# Patient Record
Sex: FEMALE | Race: WHITE
[De-identification: names, ages, dates, MRNs, and addresses within clinical notes are randomized per-mention and may not be internally consistent; named-entity substitution may affect disease eponyms.]

---

## 2018-10-22 ENCOUNTER — HOSPITAL ENCOUNTER (OUTPATIENT)
Dept: HOSPITAL 62 - RT | Age: 78
End: 2018-10-22
Attending: INTERNAL MEDICINE
Payer: MEDICARE

## 2018-10-22 DIAGNOSIS — J70.1: Primary | ICD-10-CM

## 2018-10-22 PROCEDURE — 94729 DIFFUSING CAPACITY: CPT

## 2018-10-22 PROCEDURE — 94010 BREATHING CAPACITY TEST: CPT

## 2018-10-22 PROCEDURE — 94727 GAS DIL/WSHOT DETER LNG VOL: CPT

## 2018-10-24 NOTE — PULMONARY FUNCTION TEST
Pulmonary Function Test


Date of Procedure:: 10/24/18


INDICATION:: Dyspnea on exertion


Referring Provider: Dr. Wright


Technician: Edie Mosley CRT





- Report


Spirometry: 





FVC 1.23 L       53%





FEV1 0.98 L       54%





FEV1/FVC %       80    predicted 82





FEF 25-75% 1.04 L    61%





Lung Volume: 





Total lung capacity 2.65 L    67%





Vital capacity 1.23 L    53%





Inspiratory capacity 1.05 L





FRC in 2 1.60 L    98%





ERV 0.13 L





RV 1.42 L       85%





RV/TLC %       53    predicted 42





Diffusion Capactity: 





Diffusion capacity 7.6   35%





DLCO/VA 4.10    116%


Impression: 





Mild restrictive ventilatory defect.  (Restrictive defect may mask the degree 

of obstruction.)  Mild obstructive ventilatory defect.  No hyperinflation or 

air trapping.  Severe decrease in diffusion capacity.

## 2019-02-06 ENCOUNTER — HOSPITAL ENCOUNTER (OUTPATIENT)
Dept: HOSPITAL 62 - RAD | Age: 79
End: 2019-02-06
Attending: INTERNAL MEDICINE
Payer: MEDICARE

## 2019-02-06 DIAGNOSIS — J98.4: Primary | ICD-10-CM

## 2019-02-06 PROCEDURE — 71250 CT THORAX DX C-: CPT

## 2019-02-06 NOTE — RADIOLOGY REPORT (SQ)
EXAM DESCRIPTION:  CT CHEST WITHOUT



COMPLETED DATE/TIME:  2/6/2019 1:17 pm



REASON FOR STUDY:  RESTRICTIVE LUNG DISEASE (J98.4) J98.4  OTHER DISORDERS OF LUNG



COMPARISON:  None.



TECHNIQUE:  CT scan performed of the chest without intravenous contrast.  Images reviewed with lung, 
soft tissue and bone windows.  Reconstructed coronal and sagittal MPR images reviewed.  All images st
ored on PACS.

All CT scanners at this facility use dose modulation, iterative reconstruction, and/or weight based d
osing when appropriate to reduce radiation dose to as low as reasonably achievable (ALARA).

[facility]: Dose Right  [facility]: CareDose    [facility]: Dose Right    [facility]: Teradose 4D    [facility]: Smart Modern Feed



RADIATION DOSE:  CT Rad equipment meets quality standard of care and radiation dose reduction techniq
ues were employed. CTDIvol: 16.7 mGy. DLP: 592 mGy-cm. mGy.



LIMITATIONS:  No technical limitations.



FINDINGS:  LUNGS AND PLEURA: Volume loss in the right lung with areas of bronchiectasis and periphera
l subpleural scarring.  Subpleural areas of fibrosis, most notable in the posterior right costophreni
c angle.  Left lung is relatively clear.

HILAR AND MEDIASTINAL STRUCTURES: No identified masses or abnormal nodes.  No obvious aneurysm.

HEART AND VASCULAR STRUCTURES: No pericardial effusion or aortic aneurysm.  Heavy coronary calcificat
ion.

UPPER ABDOMEN: Elevated right hemidiaphragm.  No upper abdominal mass detected.

THYROID AND OTHER SOFT TISSUES: No masses.  No adenopathy.

BONES: No significant finding.

HARDWARE: None in the chest.

OTHER: No other significant findings.



IMPRESSION:

1. Chronic appearing right lung changes as above.



TECHNICAL DOCUMENTATION:  JOB ID:  [id number]

Quality ID # [id number]: Final reports with documentation of one or more dose reduction techniques (e.g., Au
tomated exposure control, adjustment of the mA and/or kV according to patient size, use of iterative 
reconstruction technique)

 2011 [employer]- All Rights Reserved



Reading location - IP/workstation name: [email or web address]

## 2019-12-25 ENCOUNTER — HOSPITAL ENCOUNTER (INPATIENT)
Dept: HOSPITAL 62 - ER | Age: 79
LOS: 11 days | Discharge: HOME | DRG: 190 | End: 2020-01-05
Attending: STUDENT IN AN ORGANIZED HEALTH CARE EDUCATION/TRAINING PROGRAM | Admitting: STUDENT IN AN ORGANIZED HEALTH CARE EDUCATION/TRAINING PROGRAM
Payer: MEDICARE

## 2019-12-25 DIAGNOSIS — K44.9: ICD-10-CM

## 2019-12-25 DIAGNOSIS — Z82.49: ICD-10-CM

## 2019-12-25 DIAGNOSIS — Z80.9: ICD-10-CM

## 2019-12-25 DIAGNOSIS — J18.1: ICD-10-CM

## 2019-12-25 DIAGNOSIS — Z79.899: ICD-10-CM

## 2019-12-25 DIAGNOSIS — Z86.711: ICD-10-CM

## 2019-12-25 DIAGNOSIS — Z79.01: ICD-10-CM

## 2019-12-25 DIAGNOSIS — I48.91: ICD-10-CM

## 2019-12-25 DIAGNOSIS — E03.9: ICD-10-CM

## 2019-12-25 DIAGNOSIS — J20.9: ICD-10-CM

## 2019-12-25 DIAGNOSIS — M19.90: ICD-10-CM

## 2019-12-25 DIAGNOSIS — Z92.3: ICD-10-CM

## 2019-12-25 DIAGNOSIS — Z83.3: ICD-10-CM

## 2019-12-25 DIAGNOSIS — J44.0: Primary | ICD-10-CM

## 2019-12-25 DIAGNOSIS — Z90.13: ICD-10-CM

## 2019-12-25 DIAGNOSIS — Z92.21: ICD-10-CM

## 2019-12-25 DIAGNOSIS — Z85.3: ICD-10-CM

## 2019-12-25 DIAGNOSIS — Z88.6: ICD-10-CM

## 2019-12-25 DIAGNOSIS — I50.33: ICD-10-CM

## 2019-12-25 DIAGNOSIS — J44.1: ICD-10-CM

## 2019-12-25 DIAGNOSIS — J84.10: ICD-10-CM

## 2019-12-25 DIAGNOSIS — Z87.891: ICD-10-CM

## 2019-12-25 DIAGNOSIS — E11.9: ICD-10-CM

## 2019-12-25 DIAGNOSIS — Z99.81: ICD-10-CM

## 2019-12-25 DIAGNOSIS — I11.0: ICD-10-CM

## 2019-12-25 DIAGNOSIS — Z79.84: ICD-10-CM

## 2019-12-25 DIAGNOSIS — E66.01: ICD-10-CM

## 2019-12-25 DIAGNOSIS — J96.21: ICD-10-CM

## 2019-12-25 DIAGNOSIS — Z88.8: ICD-10-CM

## 2019-12-25 LAB
ADD MANUAL DIFF: NO
ALBUMIN SERPL-MCNC: 3.6 G/DL (ref 3.5–5)
ALP SERPL-CCNC: 87 U/L (ref 38–126)
ANION GAP SERPL CALC-SCNC: 7 MMOL/L (ref 5–19)
AST SERPL-CCNC: 22 U/L (ref 14–36)
BASOPHILS # BLD AUTO: 0 10^3/UL (ref 0–0.2)
BASOPHILS NFR BLD AUTO: 0.3 % (ref 0–2)
BILIRUB DIRECT SERPL-MCNC: 0.1 MG/DL (ref 0–0.4)
BILIRUB SERPL-MCNC: 0.4 MG/DL (ref 0.2–1.3)
BUN SERPL-MCNC: 28 MG/DL (ref 7–20)
CALCIUM: 9.1 MG/DL (ref 8.4–10.2)
CHLORIDE SERPL-SCNC: 97 MMOL/L (ref 98–107)
CK SERPL-CCNC: 46 U/L (ref 30–135)
CO2 SERPL-SCNC: 37 MMOL/L (ref 22–30)
EOSINOPHIL # BLD AUTO: 0.1 10^3/UL (ref 0–0.6)
EOSINOPHIL NFR BLD AUTO: 1.7 % (ref 0–6)
ERYTHROCYTE [DISTWIDTH] IN BLOOD BY AUTOMATED COUNT: 14.8 % (ref 11.5–14)
GLUCOSE SERPL-MCNC: 110 MG/DL (ref 75–110)
HCT VFR BLD CALC: 31.6 % (ref 36–47)
HGB BLD-MCNC: 10.6 G/DL (ref 12–15.5)
LYMPHOCYTES # BLD AUTO: 1.1 10^3/UL (ref 0.5–4.7)
LYMPHOCYTES NFR BLD AUTO: 19.3 % (ref 13–45)
MCH RBC QN AUTO: 32.1 PG (ref 27–33.4)
MCHC RBC AUTO-ENTMCNC: 33.5 G/DL (ref 32–36)
MCV RBC AUTO: 96 FL (ref 80–97)
MONOCYTES # BLD AUTO: 0.6 10^3/UL (ref 0.1–1.4)
MONOCYTES NFR BLD AUTO: 10 % (ref 3–13)
NEUTROPHILS # BLD AUTO: 3.9 10^3/UL (ref 1.7–8.2)
NEUTS SEG NFR BLD AUTO: 68.7 % (ref 42–78)
NT PRO BNP: 2580 PG/ML (ref ?–450)
PLATELET # BLD: 195 10^3/UL (ref 150–450)
POTASSIUM SERPL-SCNC: 4.7 MMOL/L (ref 3.6–5)
PROT SERPL-MCNC: 6.6 G/DL (ref 6.3–8.2)
RBC # BLD AUTO: 3.29 10^6/UL (ref 3.72–5.28)
TOTAL CELLS COUNTED % (AUTO): 100 %
TROPONIN I SERPL-MCNC: 0.03 NG/ML
WBC # BLD AUTO: 5.7 10^3/UL (ref 4–10.5)

## 2019-12-25 PROCEDURE — 80048 BASIC METABOLIC PNL TOTAL CA: CPT

## 2019-12-25 PROCEDURE — 83880 ASSAY OF NATRIURETIC PEPTIDE: CPT

## 2019-12-25 PROCEDURE — 93321 DOPPLER ECHO F-UP/LMTD STD: CPT

## 2019-12-25 PROCEDURE — 80162 ASSAY OF DIGOXIN TOTAL: CPT

## 2019-12-25 PROCEDURE — 36600 WITHDRAWAL OF ARTERIAL BLOOD: CPT

## 2019-12-25 PROCEDURE — 93010 ELECTROCARDIOGRAM REPORT: CPT

## 2019-12-25 PROCEDURE — 36415 COLL VENOUS BLD VENIPUNCTURE: CPT

## 2019-12-25 PROCEDURE — 82962 GLUCOSE BLOOD TEST: CPT

## 2019-12-25 PROCEDURE — 71045 X-RAY EXAM CHEST 1 VIEW: CPT

## 2019-12-25 PROCEDURE — 82565 ASSAY OF CREATININE: CPT

## 2019-12-25 PROCEDURE — 96374 THER/PROPH/DIAG INJ IV PUSH: CPT

## 2019-12-25 PROCEDURE — 74230 X-RAY XM SWLNG FUNCJ C+: CPT

## 2019-12-25 PROCEDURE — 84484 ASSAY OF TROPONIN QUANT: CPT

## 2019-12-25 PROCEDURE — 70450 CT HEAD/BRAIN W/O DYE: CPT

## 2019-12-25 PROCEDURE — 84443 ASSAY THYROID STIM HORMONE: CPT

## 2019-12-25 PROCEDURE — 87040 BLOOD CULTURE FOR BACTERIA: CPT

## 2019-12-25 PROCEDURE — 94660 CPAP INITIATION&MGMT: CPT

## 2019-12-25 PROCEDURE — 99291 CRITICAL CARE FIRST HOUR: CPT

## 2019-12-25 PROCEDURE — 99285 EMERGENCY DEPT VISIT HI MDM: CPT

## 2019-12-25 PROCEDURE — 5A09357 ASSISTANCE WITH RESPIRATORY VENTILATION, LESS THAN 24 CONSECUTIVE HOURS, CONTINUOUS POSITIVE AIRWAY PRESSURE: ICD-10-PCS | Performed by: FAMILY MEDICINE

## 2019-12-25 PROCEDURE — 96375 TX/PRO/DX INJ NEW DRUG ADDON: CPT

## 2019-12-25 PROCEDURE — 93306 TTE W/DOPPLER COMPLETE: CPT

## 2019-12-25 PROCEDURE — 82550 ASSAY OF CK (CPK): CPT

## 2019-12-25 PROCEDURE — 83735 ASSAY OF MAGNESIUM: CPT

## 2019-12-25 PROCEDURE — 80053 COMPREHEN METABOLIC PANEL: CPT

## 2019-12-25 PROCEDURE — 84439 ASSAY OF FREE THYROXINE: CPT

## 2019-12-25 PROCEDURE — 83605 ASSAY OF LACTIC ACID: CPT

## 2019-12-25 PROCEDURE — 70551 MRI BRAIN STEM W/O DYE: CPT

## 2019-12-25 PROCEDURE — 80061 LIPID PANEL: CPT

## 2019-12-25 PROCEDURE — 70544 MR ANGIOGRAPHY HEAD W/O DYE: CPT

## 2019-12-25 PROCEDURE — 83036 HEMOGLOBIN GLYCOSYLATED A1C: CPT

## 2019-12-25 PROCEDURE — 93880 EXTRACRANIAL BILAT STUDY: CPT

## 2019-12-25 PROCEDURE — 82803 BLOOD GASES ANY COMBINATION: CPT

## 2019-12-25 PROCEDURE — 85025 COMPLETE CBC W/AUTO DIFF WBC: CPT

## 2019-12-25 PROCEDURE — 84481 FREE ASSAY (FT-3): CPT

## 2019-12-25 PROCEDURE — 93005 ELECTROCARDIOGRAM TRACING: CPT

## 2019-12-25 PROCEDURE — 85027 COMPLETE CBC AUTOMATED: CPT

## 2019-12-25 PROCEDURE — 80202 ASSAY OF VANCOMYCIN: CPT

## 2019-12-25 NOTE — ER DOCUMENT REPORT
ED Medical Screen (RME)





- General


Chief Complaint: Shortness Of Breath


Stated Complaint: SHORTNESS OF BREATH


Time Seen by Provider: 12/25/19 19:47


Primary Care Provider: 


GALDINO MORROW MD [Primary Care Provider] - Follow up as needed


Notes: 





Patient is a 79-year-old female who presents to the emergency department with 

shortness of breath.  Patient states that her shortness of breath has been 

gradual over the past week.  She also admits to increased swelling to bilateral 

legs.  She currently takes Lasix twice daily.  Patient has a history of COPD and

she is oxygen dependent.





Exam: Crackles noted in bilateral bases.  Oxygen saturation 86% on 3 L nasal 

cannula.





I have greeted and performed a rapid initial assessment of this patient.  A 

comprehensive ED assessment and evaluation of the patient, analysis of test 

results and completion of medical decision making process will be conducted by 

an additional ED providers.


TRAVEL OUTSIDE OF THE [place] IN LAST 30 DAYS: No





- Related Data


Allergies/Adverse Reactions: 


                                        





albuterol Allergy (Verified 12/25/19 19:53)


   


aspirin Allergy (Verified 12/25/19 19:53)


   


diphenhydramine [From Benadryl] Allergy (Verified 12/25/19 19:53)


   











Physical Exam





- Vital signs


Vitals: 





                                        











Temp Pulse Resp BP Pulse Ox


 


 97.4 F   44 L  16   138/75 H  65 L


 


 12/25/19 19:30  12/25/19 19:30  12/25/19 19:30  12/25/19 19:30  12/25/19 19:30














Course





- Vital Signs


Vital signs: 





                                        











Temp Pulse Resp BP Pulse Ox


 


 97.4 F   44 L  16   138/75 H  65 L


 


 12/25/19 19:30  12/25/19 19:30  12/25/19 19:30  12/25/19 19:30  12/25/19 19:30














Doctor's Discharge





- Discharge


Referrals: 


GALDINO MORROW MD [Primary Care Provider] - Follow up as needed

## 2019-12-25 NOTE — RADIOLOGY REPORT (SQ)
EXAM DESCRIPTION: 



XR CHEST 1 VIEW



COMPLETED DATE/TME:  12/25/2019 19:52



CLINICAL HISTORY: 



79 years, Female, shortness of breath



COMPARISON:

None.



NUMBER OF VIEWS:

1



TECHNIQUE:

Portable chest



LIMITATIONS:

None.



FINDINGS:



Elevation of the right hemidiaphragm. Cardiomegaly. Surgical

clips in the axillary regions bilaterally. Osteopenia. Equivocal

airspace opacity right lung base. No pneumothorax



IMPRESSION:



Equivocal right basilar airspace opacity. There is elevation

right hemidiaphragm. Cardiac megaly

 



copyright 2011 [employer]- All Rights Reserved

## 2019-12-25 NOTE — ER DOCUMENT REPORT
ED General





- General


Chief Complaint: Shortness Of Breath


Stated Complaint: SHORTNESS OF BREATH


Time Seen by Provider: 12/25/19 19:47


TRAVEL OUTSIDE OF THE [place] IN LAST 30 DAYS: No





- HPI


Notes: 





This is a 79-year-old patient who presents today with a complaint of shortness 

of breath.  Patient has had progressive worsening exertional dyspnea over the 

past several days, significantly worse today.  She also describes a cough 

productive of green sputum.  She denies any fever or chills.  She denies any lisette

st pain.  She wears oxygen at home but has been forced to increase her oxygen to

3 L recently because of increased worsening shortness of breath.  She describes 

her symptoms as moderate.  Symptoms worse with exertion.





- Related Data


Allergies/Adverse Reactions: 


                                        





albuterol Allergy (Verified 12/25/19 19:53)


   


aspirin Allergy (Verified 12/25/19 19:53)


   


diphenhydramine [From Benadryl] Allergy (Verified 12/25/19 19:53)


   











Past Medical History





- Social History


Smoking Status: Former Smoker


Frequency of alcohol use: None


Drug Abuse: None


Family History: Hypertension


Patient has suicidal ideation: No


Patient has homicidal ideation: No





Review of Systems





- Review of Systems


Cardiovascular: Palpitations, Edema.  denies: Chest pain


Respiratory: Cough, Short of breath, Sputum


Gastrointestinal: denies: Abdominal pain


-: Yes All other systems reviewed and negative





Physical Exam





- Vital signs


Vitals: 


                                        











Temp Pulse Resp BP Pulse Ox


 


 97.4 F   44 L  16   138/75 H  65 L


 


 12/25/19 19:30  12/25/19 19:30  12/25/19 19:30  12/25/19 19:30  12/25/19 19:30











Interpretation: Tachycardic





- General


In distress: Mild





- Respiratory


Respiratory status: Respiratory distress


Chest status: Accessory muscle use


Breath sounds: Rales, Rhonchi, Wheezing





- Cardiovascular


Rhythm: Tachycardia


Heart sounds: Normal auscultation





- Abdominal


Inspection: Normal


Distension: No distension


Bowel sounds: Normal


Tenderness: Nontender


Organomegaly: No organomegaly





- Extremities


General lower extremity: Edema - 2+ peripheral edema bilaterally.





- Neurological


Neuro grossly intact: Yes


Cognition: Normal


Orientation: AAOx4


[place]field Coma Scale Eye Opening: Spontaneous


[place] Coma Scale Verbal: Oriented


Harley Coma Scale Motor: Obeys Commands


Harley Coma Scale Total: 15


Speech: Normal


Motor strength normal: LUE, RUE, LLE, RLE


Sensory: Normal





Course





- Re-evaluation


Re-evalutation: 





12/25/19 20:15


Differential diagnosis includes CHF exacerbation versus pneumonia.





EKG shows sinus tach at 130 bpm.  Right bundle branch block.  No acute injury 

pattern.  No old EKGs to compare.


12/25/19 22:09





Patient reevaluated.  Patient is doing well.  Chest x-ray is suggestive of CHF 

with probably right-sided pneumonia.  Will cover her for both.  Lasix ordered.  

Rocephin and Zithromax was ordered.





Patient's care discussed with Dr. Weiland.  Will admit.





- Vital Signs


Vital signs: 


                                        











Temp Pulse Resp BP Pulse Ox


 


 98.7 F   44 L  20   107/88 H  98 


 


 12/26/19 01:01  12/25/19 19:30  12/26/19 01:01  12/26/19 01:01  12/26/19 02:34














- Laboratory


Result Diagrams: 


                                 12/25/19 20:45





                                 12/25/19 20:45


Laboratory results interpreted by me: 


                                        











  12/25/19 12/25/19 12/25/19





  20:45 20:45 20:45


 


RBC  3.29 L  


 


Hgb  10.6 L  


 


Hct  31.6 L  


 


RDW  14.8 H  


 


Chloride   97 L 


 


Carbon Dioxide   37 H 


 


BUN   28 H 


 


Lactic Acid   


 


NT-Pro-B Natriuret Pep    2580 H














  12/25/19





  21:58


 


RBC 


 


Hgb 


 


Hct 


 


RDW 


 


Chloride 


 


Carbon Dioxide 


 


BUN 


 


Lactic Acid  0.5 L


 


NT-Pro-B Natriuret Pep 














Discharge





- Discharge


Clinical Impression: 


 Acute CHF, Pneumonia





Condition: Fair


Disposition: ADMITTED AS INPATIENT


Admitting Provider: Weiland (Hospitalist)


Unit Admitted: Telemetry

## 2019-12-26 LAB
ANION GAP SERPL CALC-SCNC: 5 MMOL/L (ref 5–19)
BASE EXCESS BLDV CALC-SCNC: 10.9 MMOL/L
BUN SERPL-MCNC: 26 MG/DL (ref 7–20)
CALCIUM: 8.8 MG/DL (ref 8.4–10.2)
CHLORIDE SERPL-SCNC: 98 MMOL/L (ref 98–107)
CHOLEST SERPL-MCNC: 117.26 MG/DL (ref 0–200)
CO2 SERPL-SCNC: 38 MMOL/L (ref 22–30)
ERYTHROCYTE [DISTWIDTH] IN BLOOD BY AUTOMATED COUNT: 14.5 % (ref 11.5–14)
GLUCOSE SERPL-MCNC: 110 MG/DL (ref 75–110)
HCO3 BLDV-SCNC: 38.5 MMOL/L (ref 20–32)
HCT VFR BLD CALC: 30.4 % (ref 36–47)
HGB BLD-MCNC: 10.3 G/DL (ref 12–15.5)
LDLC SERPL DIRECT ASSAY-MCNC: 52 MG/DL (ref ?–100)
MCH RBC QN AUTO: 32.3 PG (ref 27–33.4)
MCHC RBC AUTO-ENTMCNC: 33.8 G/DL (ref 32–36)
MCV RBC AUTO: 95 FL (ref 80–97)
PCO2 BLDV: 66.1 MMHG (ref 35–63)
PH BLDV: 7.38 [PH] (ref 7.3–7.42)
PLATELET # BLD: 169 10^3/UL (ref 150–450)
POTASSIUM SERPL-SCNC: 4.1 MMOL/L (ref 3.6–5)
RBC # BLD AUTO: 3.19 10^6/UL (ref 3.72–5.28)
TRIGL SERPL-MCNC: 78 MG/DL (ref ?–150)
VLDLC SERPL CALC-MCNC: 16 MG/DL (ref 10–31)
WBC # BLD AUTO: 4.7 10^3/UL (ref 4–10.5)

## 2019-12-26 RX ADMIN — DOCUSATE SODIUM SCH: 100 CAPSULE, LIQUID FILLED ORAL at 17:56

## 2019-12-26 RX ADMIN — Medication SCH ML: at 21:22

## 2019-12-26 RX ADMIN — INSULIN HUMAN SCH UNIT: 100 INJECTION, SOLUTION PARENTERAL at 11:46

## 2019-12-26 RX ADMIN — BUMETANIDE SCH MG: 0.25 INJECTION INTRAMUSCULAR; INTRAVENOUS at 11:45

## 2019-12-26 RX ADMIN — DOCUSATE SODIUM SCH: 100 CAPSULE, LIQUID FILLED ORAL at 10:12

## 2019-12-26 RX ADMIN — BUMETANIDE SCH MG: 0.25 INJECTION INTRAMUSCULAR; INTRAVENOUS at 05:31

## 2019-12-26 RX ADMIN — MORPHINE SULFATE PRN MG: 10 INJECTION INTRAMUSCULAR; INTRAVENOUS; SUBCUTANEOUS at 02:45

## 2019-12-26 RX ADMIN — IPRATROPIUM BROMIDE SCH MG: 0.5 SOLUTION RESPIRATORY (INHALATION) at 07:56

## 2019-12-26 RX ADMIN — BUMETANIDE SCH MG: 0.25 INJECTION INTRAMUSCULAR; INTRAVENOUS at 18:01

## 2019-12-26 RX ADMIN — INSULIN HUMAN SCH: 100 INJECTION, SOLUTION PARENTERAL at 17:55

## 2019-12-26 RX ADMIN — Medication SCH ML: at 13:45

## 2019-12-26 RX ADMIN — INSULIN HUMAN SCH: 100 INJECTION, SOLUTION PARENTERAL at 10:08

## 2019-12-26 RX ADMIN — CYCLOBENZAPRINE HYDROCHLORIDE PRN MG: 10 TABLET, FILM COATED ORAL at 18:09

## 2019-12-26 RX ADMIN — IPRATROPIUM BROMIDE SCH MG: 0.5 SOLUTION RESPIRATORY (INHALATION) at 17:05

## 2019-12-26 RX ADMIN — BUDESONIDE SCH MG: 0.5 SUSPENSION RESPIRATORY (INHALATION) at 07:56

## 2019-12-26 RX ADMIN — IPRATROPIUM BROMIDE SCH MG: 0.5 SOLUTION RESPIRATORY (INHALATION) at 00:03

## 2019-12-26 RX ADMIN — INSULIN HUMAN SCH UNIT: 100 INJECTION, SOLUTION PARENTERAL at 21:21

## 2019-12-26 RX ADMIN — HEPARIN SODIUM SCH UNIT: 5000 INJECTION, SOLUTION INTRAVENOUS; SUBCUTANEOUS at 21:21

## 2019-12-26 RX ADMIN — IPRATROPIUM BROMIDE SCH MG: 0.5 SOLUTION RESPIRATORY (INHALATION) at 23:47

## 2019-12-26 RX ADMIN — LEVOFLOXACIN SCH MG: 750 TABLET, FILM COATED ORAL at 10:11

## 2019-12-26 RX ADMIN — MORPHINE SULFATE PRN MG: 10 INJECTION INTRAMUSCULAR; INTRAVENOUS; SUBCUTANEOUS at 00:03

## 2019-12-26 RX ADMIN — HEPARIN SODIUM SCH UNIT: 5000 INJECTION, SOLUTION INTRAVENOUS; SUBCUTANEOUS at 05:31

## 2019-12-26 RX ADMIN — HEPARIN SODIUM SCH UNIT: 5000 INJECTION, SOLUTION INTRAVENOUS; SUBCUTANEOUS at 13:45

## 2019-12-26 RX ADMIN — Medication SCH: at 05:31

## 2019-12-26 RX ADMIN — BUDESONIDE SCH MG: 0.5 SUSPENSION RESPIRATORY (INHALATION) at 21:16

## 2019-12-26 RX ADMIN — BUMETANIDE SCH MG: 0.25 INJECTION INTRAMUSCULAR; INTRAVENOUS at 00:03

## 2019-12-26 RX ADMIN — FAMOTIDINE SCH MG: 20 TABLET, FILM COATED ORAL at 21:22

## 2019-12-26 RX ADMIN — FAMOTIDINE SCH MG: 20 TABLET, FILM COATED ORAL at 10:11

## 2019-12-26 NOTE — PDOC H&P
History of Present Illness


Admission Date/PCP: 


12/25/2019  22:08


LESLIE MABRY MD


Patient complains of: Dyspnea


History of Present Illness: 


SHAYAN BUSH is a 79 year old female who presented to the emergency room with 

a one-week history of dyspnea.  Patient admits gradually worsening dyspnea over 

the last 7 days with associated increase and bilateral lower extremity edema and

significant worsening of her dyspnea with exertion.  Her dyspnea became severe 

today causing her to present to the emergency room.  She has been using her 

prescribed inhalers and continuous oxygen at home at 3 L/min via nasal cannula, 

without improvement.  She also admits a chronic cough which has been occasi

onally productive of small amounts of greenish mucus.  She denies other 

associated or accompanying signs and symptoms.  She admits prior similar 

episodes related to her COPD and congestive heart failure.  She has not 

identified any additional aggravating or ameliorating factors for her dyspnea.  

In the emergency room she was found to be hypoxic with a O2 sat of 86 on her 

usual oxygen at 3 L/min via nasal cannula.  Her chest x-ray showed evidence of a

right basilar opacity with an elevated right hemidiaphragm.  Clinically the 

patient appears to have acute congestive heart failure based on her examination 

by the ER physician.  He treated her with IV Lasix and also initiated antibiotic

therapy for a possible pneumonia.  Patient was subsequently admitted to the 

hospital for further evaluation and treatment.





Past Medical History


Cardiac Medical History: Reports: Congestive Heart Failure, Hypertension


   Denies: Atrial Fibrillation


Pulmonary Medical History: Reports: Asthma, Chronic Obstructive Pulmonary 

Disease (COPD), Pneumonia, Respiratory Failure - Chronic respiratory failure 

with oxygen dependence


EENT Medical History: Reports: Cataracts - Bilateral


   Denies: Ears - Hearing aids


Neurological Medical History: 


   Denies: Hemorrhagic CVA, Ischemic CVA, Seizures


Endocrine Medical History: Reports: Diabetes Mellitus Type 2, Obesity


   Denies: Diabetes Mellitus Type 1, Hyperthyroidism, Hypothyroidism


Renal/ Medical History: 


   Denies: Chronic Kidney Disease, Nephrolithiasis


Malignancy Medical History: Reports: Breast Cancer - Status post bilateral 

mastectomies


GI Medical History: Reports: Hiatal Hernia


   Denies: Cirrhosis, Crohn's Disease, Hepatitis, Ulcerative Colitis


Musculoskeltal Medical History: Reports: Arthritis


   Denies: Gout


Skin Medical History: 


   Denies: Eczema, Psoriasis


Psychiatric Medical History: 


   Denies: Alcohol Dependency, Substance Abuse, Tobacco Dependency


Traumatic Medical History: Reports: None


Hematology: 


   Denies: Anemia, Bleeding Tendencies


Infectious Medical History: Reports: None





Past Surgical History


Past Surgical History: Reports: Appendectomy, Cholecystectomy, Hysterectomy, 

Mastectomy - bilateral, Other - Bilateral cataract surgery, 5 colon surgeries 

for abnormal growths





Social History


Information Source: Patient


Lives with: Family


Smoking Status: Former Smoker


Electronic Cigarette use?: No


Frequency of Alcohol Use: None


Hx Recreational Drug Use: No


Drugs: None


Hx Prescription Drug Abuse: No





- Advance Directive


Resuscitation Status: Full Code


Surrogate healthcare decision maker:: 


Fatemeh Nolanid





Family History


Family History: CAD, DM, Hypertension, Malignancy.  denies: Thyroid Disfunction


Parental Family History Reviewed: Yes


Children Family History Reviewed: No


Sibling(s) Family History Reviewed.: Yes





Medication/Allergy


Allergies/Adverse Reactions: 


                                        





albuterol Allergy (Verified 12/25/19 19:53)


   


aspirin Allergy (Verified 12/25/19 19:53)


   


diphenhydramine [From Benadryl] Allergy (Verified 12/25/19 19:53)


   











Review of Systems


Constitutional: ABSENT: chills, fever(s)


Eyes: ABSENT: visual disturbances, other - Eye pain


Ears: ABSENT: hearing changes, other - Ear pain


Nose, Mouth, and Throat: ABSENT: headache(s), mouth pain, sore throat


Cardiovascular: PRESENT: as per HPI, dyspnea on exertion, edema.  ABSENT: chest 

pain, orthropnea, palpitations


Respiratory: PRESENT: as per HPI, cough, dyspnea, sputum


Gastrointestinal: ABSENT: abdominal pain, constipation, diarrhea, nausea, 

vomiting


Genitourinary: ABSENT: dysuria, hematuria


Musculoskeletal: PRESENT: other - Frequent nocturnal leg cramps.  ABSENT: back 

pain, joint swelling, muscle weakness


Integumentary: ABSENT: pruritus, rash


Neurological: ABSENT: confusion, convulsions, focal weakness, memory loss, 

syncope


Psychiatric: ABSENT: anxiety, depression


Endocrine: ABSENT: cold intolerance, heat intolerance


Hematologic/Lymphatic: ABSENT: easy bleeding, easy bruising


Allergic/Immunologic: ABSENT: seasonal rhinorrhea





Physical Exam


Vital Signs: 


                                        











Temp Pulse Resp BP Pulse Ox


 


 97.4 F   44 L  16   138/75 H  98 


 


 12/25/19 19:30  12/25/19 19:30  12/25/19 19:30  12/25/19 19:30  12/25/19 19:52








                                 Intake & Output











 12/23/19 12/24/19 12/25/19





 23:59 23:59 23:59


 


Weight   110.4 kg











General appearance: PRESENT: no acute distress, cooperative, morbidly obese


Head exam: PRESENT: atraumatic, normocephalic


Eye exam: PRESENT: conjunctiva pink.  ABSENT: conjunctival injection, scleral 

icterus


Ear exam: PRESENT: normal external ear exam.  ABSENT: bleeding, drainage


Mouth exam: PRESENT: dry mucosa, neck supple


Neck exam: PRESENT: JVD - Bilateral at 30 degrees elevation.  ABSENT: 

thyromegaly, tracheal deviation


Respiratory exam: PRESENT: prolonged expiratory phas - Mildly prolonged 

expiratory phase present in all fields, rales - Bibasilar fine rales in the 

lower one fourth of the bilateral lung fields, symmetrical, tachypnea, wheezes -

Minimal expiratory wheezes noted in all fields


Cardiovascular exam: PRESENT: gallop - S4 gallop, RRR, tachycardia.  ABSENT: 

clicks, rubs


Pulses: PRESENT: normal radial pulses, normal dorsalis pedis pul


Vascular exam: PRESENT: normal capillary refill.  ABSENT: pallor


GI/Abdominal exam: PRESENT: normal bowel sounds, soft


Rectal exam: PRESENT: deferred


Extremities exam: PRESENT: pedal edema - Bilateral, +2 edema - 2+ pretibial 

pitting edema bilaterally.  ABSENT: joint swelling


Musculoskeletal exam: ABSENT: deformity, dislocation


Neurological exam: PRESENT: alert, oriented to person, oriented to place, 

oriented to time, oriented to situation, CN II-XII grossly intact.  ABSENT: 

motor sensory deficit


Psychiatric exam: PRESENT: appropriate affect, normal mood


Skin exam: PRESENT: dry, intact, warm.  ABSENT: jaundice, rash, urticaria





Results


Laboratory Results: 


                                        





                                 12/25/19 20:45 





                                 12/25/19 20:45 





                                        











  12/25/19 12/25/19





  20:45 20:45


 


WBC  5.7 


 


RBC  3.29 L 


 


Hgb  10.6 L 


 


Hct  31.6 L 


 


MCV  96 


 


MCH  32.1 


 


MCHC  33.5 


 


RDW  14.8 H 


 


Plt Count  195 


 


Seg Neutrophils %  68.7 


 


Sodium   141.4


 


Potassium   4.7


 


Chloride   97 L


 


Carbon Dioxide   37 H


 


Anion Gap   7


 


BUN   28 H


 


Creatinine   0.82


 


Est GFR (African Amer)   > 60


 


Glucose   110


 


Calcium   9.1


 


Total Bilirubin   0.4


 


AST   22


 


Alkaline Phosphatase   87


 


Total Protein   6.6


 


Albumin   3.6








                                        











  12/25/19 12/25/19





  20:45 20:45


 


Creatine Kinase  46 


 


Troponin I   0.025


 


NT-Pro-B Natriuret Pep   2580 H














Assessment and Plan





- Diagnosis


(1) Acute on chronic diastolic congestive heart failure


Is this a current diagnosis for this admission?: Yes   





(2) Acute and chronic respiratory failure with hypoxia


Is this a current diagnosis for this admission?: Yes   





(3) Chronic obstructive pulmonary disease (COPD)


Qualifiers: 


   COPD type: unspecified COPD   Qualified Code(s): J44.9 - Chronic obstructive 

pulmonary disease, unspecified   


Is this a current diagnosis for this admission?: Yes   





(4) Normochromic normocytic anemia


Is this a current diagnosis for this admission?: Yes   





(5) Morbid obesity with BMI of 40.0-44.9, adult


Is this a current diagnosis for this admission?: Yes   





(6) Diabetes mellitus type 2 in obese


Is this a current diagnosis for this admission?: Yes   





- Plan Summary


Summary: 


Patient is admitted to a telemetry bed where she will receive usual supportive 

and symptomatic cares.  She will be treated with IV Bumex 1 mg every 6 hours to 

help resolve her acute congestive heart failure.  She will also receive 

supplemental oxygen utilizing nasal cannula and or noninvasive airway pressure 

support devices such as CPAP or BiPAP to obtain an adequate oxygen saturation 

and avoid hypercapnia.  She will receive morphine sulfate 2 mg IV every 1 hour 

PRN severe dyspnea.  She will receive a pulmonary toilet utilizing Atrovent and 

Pulmicort and will be maintained on antibiotic therapy for possible 

pneumonia/pneumonitis with Levaquin 750 mg p.o. daily x4 days.  Patient will be 

continued on her usual home medications as soon as her medication reconciliation

has been verified and completed.  A nutritionist consultation will be obtained 

to  the patient on her obesity and possible diet interventions.  Patient 

will be continued on her cardiac and diabetic restricted diet.  Before meals and

at bedtime Accu-Cheks will be obtained with sliding scale insulin used for 

control of hyperglycemia and a hypoglycemic protocol in place.





- Time


Time Spent with patient: 15-24 minutes


Medications reviewed and adjusted accordingly: Yes


Anticipated discharge: Home





- Inpatient Certification


Based on my medical assessment, after consideration of the patient's 

comorbidities, presenting symptoms, or acuity I expect that the services needed 

warrant INPATIENT care.: Yes


I certify that my determination is in accordance with my understanding of 

Medicare's requirements for reasonable and necessary INPATIENT services [42 CFR 

412.3e].: Yes


Medical Necessity: Need Close Monitoring Due to Risk of Patient Decompensation, 

Need For Continuous Telemetry Monitoring, Need for Nebulizer Therapy and 

Monitoring of Response, Risk of Complication if Not Cared For in Hospital

## 2019-12-26 NOTE — PDOC CONSULTATION
Consultation-Blank


Consultation: 





CARDIOLOGY CONSULTATION with Dr. Nuvia Abdul on 12/26/2019.  Patient seen

at 3:45 PM.  60 minutes spent on this patient more than 50% time spent direct 

patient care.





REASON FOR CONSULTATION: Congestive heart failure.





Consult Requesting PHYSICIAN: Dr. Freddie Green, [facility] physician hospitalist 

group.





HISTORY OF PRESENT ILLNESS: Patient is a 79-year-old  female with known

history of hypertension, diabetes mellitus, asthma, COPD, on home oxygen, 

restrictive lung disease and history of pulmonary fibrosis states since past 6 

to 7 days has been having progressively increasing shortness of breath with 

orthopnea.  Also she has been having increased leg swelling.  She also has been 

having episodes of PND.  She denies any chest pain or palpitations.  The patient

was found to be hypoxic in the emergency room with O2 saturation being 86 on her

home oxygen at 2 L/min.  She also states that she has intermittent coughing with

intermittent wheezing.  The cough is productive of greenish sputum which is not 

a whole lot.  She has no hemoptysis.  There is no pleuritic chest pain.  The 

patient denies past history of congestive heart failure, and denies any history 

of MI or anginal symptoms.  She states she had moved here about a year ago, and 

of out[place]state pulmonologist was treating of her lung problems, but has never 

needed a cardiology consultation.  She denies any chronic kidney disease.  There

is no history of sleep apnea.  Her echocardiogram shows LV ejection fraction of 

30% to 35% with current treatment she feels slightly better.  She has no history

of MI or cardiac arrhythmia.  There is no history of TIA or CVA.  She has a past

history of pulmonary embolism and is on Xarelto.  She has also history of 

radiation after chemotherapy for bilateral breast cancer for which she has had 

bilateral mastectomies.





Past Medical History


Cardiac Medical History: Reports: Denies history of CAD MI or past history of 

congestive Heart Failure, no history of cardiac arrhythmias or syncope.  She has

a history of hypertension


   Denies: Atrial Fibrillation


Pulmonary Medical History: Reports: Asthma, Chronic Obstructive Pulmonary 

Disease (COPD), Pneumonia, Respiratory Failure - Chronic respiratory failure 

with oxygen dependence.  She also has a past history of pulmonary embolism.  

Denies history of sleep apnea.


EENT Medical History: Reports: Cataracts - Bilateral


   Denies: Ears - Hearing aids


Neurological Medical History: 


   Denies: Hemorrhagic CVA, Ischemic CVA, Seizures


Endocrine Medical History: Reports: Diabetes Mellitus Type 2, Obesity and hyp

othyroidism, on replacement therapy for the hypothyroidism.


Renal/ Medical History: 


   Denies: Chronic Kidney Disease, Nephrolithiasis no history of chronic kidney 

disease.


Malignancy Medical History: Reports: Breast Cancer - Status post bilateral 

mastectomies, after chemotherapy and radiation treatment.


GI Medical History: Reports: Hiatal Hernia


   Denies: Cirrhosis, Crohn's Disease, Hepatitis, Ulcerative Colitis


Musculoskeltal Medical History: Reports: Arthritis


   Denies: Gout


Skin Medical History: 


   Denies: Eczema, Psoriasis


Psychiatric Medical History: 


   Denies: Alcohol Dependency, Substance Abuse, Tobacco Dependency


Traumatic Medical History: Reports: None


Hematology: 


   Denies: Anemia, Bleeding Tendencies


Infectious Medical History: Reports: None





Past Surgical History


Past Surgical History: Reports: Appendectomy, Cholecystectomy, Hysterectomy, 

Mastectomy - bilateral, Other - Bilateral cataract surgery, 5 colon surgeries 

for abnormal growths.  She has also had surgery for abdominal wall hernia, and 

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





Social History


Information Source: Patient


Lives with: Family


Smoking Status: Former Smoker


Electronic Cigarette use?: No


Frequency of Alcohol Use: None


Hx Recreational Drug Use: No


Drugs: None


Hx Prescription Drug Abuse: No





- Advance Directive


Resuscitation Status: Full Code


Surrogate healthcare decision maker:: 


Fatemeh [place]





Family History


Family History: CAD, DM, Hypertension,malignancy.  denies: Thyroid Disfunction


Parental Family History Reviewed: Yes


Children Family History Reviewed: No


Sibling(s) Family History Reviewed.: Yes





Medication/Allergy


Allergies/Adverse Reactions: 


                                        





albuterol Allergy (Verified 12/25/19 19:53)


   


aspirin Allergy (Verified 12/25/19 19:53)


   


diphenhydramine [From Benadryl] Allergy (Verified 12/25/19 19:53)





RESUSCITATION STATUS: The patient is a full code.  Her daughter is her surrogate

healthcare decision maker.


   


Current Medications











Generic Name Dose Route Start Last Admin





  Trade Name Freq  PRN Reason Stop Dose Admin


 


Acetaminophen  650 mg  12/25/19 22:39 





  Tylenol 325 Mg Tablet  PO  01/24/20 22:38 





  Q4HP PRN  





  For headache, pain or fever  


 


Al Hydrox/Mg Hydrox/Simethicone  30 ml  12/25/19 22:33 





  Maalox Plus Susp 30 Udcup  PO  01/24/20 22:32 





  Q6HP PRN  





  HEARTBURN  


 


Budesonide  0.5 mg  12/26/19 08:00  12/26/19 21:16





  Pulmicort Neb 0.5 Mg/2 Ml Ampul  NEB  01/25/20 07:59  0.5 mg





  RTBID NAYAN   Administration


 


Bumetanide  1 mg  12/26/19 00:00  12/26/19 18:01





  Bumex Inj/Pf 1 Mg/4 Ml Sdv  IV  12/27/19 06:01  1 mg





  Q6 NAYAN   Administration


 


Cyclobenzaprine HCl  10 mg  12/26/19 17:01  12/26/19 18:09





  Flexeril 10 Mg Tablet  PO  01/25/20 17:00  10 mg





  Q8HP PRN   Administration





  MUSCLE SPASMS  


 


Dextrose  12.5 gm  12/26/19 05:22 





  Dextrose Inj 50% Syringe (25 Gm/50 Ml)  IV  01/25/20 05:21 





  PRN PRN  





  FOR BG 50-69 IN ALERT PATIENT  





  Protocol  


 


Dextrose  25 gm  12/26/19 05:22 





  Dextrose Inj 50% Syringe (25 Gm/50 Ml)  IV  01/25/20 05:21 





  PRN PRN  





  PER PROTOCOL  





  Protocol  


 


Docusate Sodium  100 mg  12/26/19 10:00  12/26/19 17:56





  Colace 100 Mg Capsule  PO  01/25/20 09:59  Not Given





  BID NAYAN  


 


Famotidine  20 mg  12/26/19 10:00  12/26/19 21:22





  Pepcid 20 Mg Tablet  PO  01/25/20 09:59  20 mg





  Q12 NAYAN   Administration


 


Glucagon  1 mg  12/26/19 05:22 





  Glucagen Inj 1 Mg Vial  IM  01/25/20 05:21 





  PRN PRN  





  Evaluate for BG < 70  





  Protocol  


 


Glucose  15 gm  12/26/19 05:22 





  Glutose 40% Gel 15 Gm Tube  PO  01/25/20 05:21 





  PRN PRN  





  FOR BG 50-69 IN ALERT PATIENT  





  Protocol  


 


Glucose  30 gm  12/26/19 05:22 





  Glutose 40% Gel 15 Gm Tube  PO  01/25/20 05:21 





  PRN PRN  





  FOR BG < 50 IN ALERT PATIENT   





  Protocol  


 


Heparin Sodium (Porcine)  5,000 unit  12/26/19 06:00  12/26/19 21:21





  Heparin Inj 5,000 Units/Ml 1 Ml Vial  SUBCUT  01/25/20 05:59  5,000 unit





  Q8 NAYAN   Administration


 


Insulin Human Regular  0 - 15 unit  12/26/19 08:00  12/26/19 21:21





  Humulin R (Pyxis) Insulin 100 Unit/Ml 3ml  SUBCUT  01/25/20 07:59  3 unit





  ACHS NAYAN   Administration





  Protocol  


 


Ipratropium Bromide  0.5 mg  12/26/19 00:00  12/26/19 23:47





  Atrovent 0.02% Neb 0.5 Mg/2.5 Ml Ampul  NEB  01/25/20 00:00  0.5 mg





  RTQ8 NAYAN   Administration


 


Levofloxacin  750 mg  12/26/19 10:00  12/26/19 10:11





  Levaquin 750 Mg Tablet  PO  01/02/20 09:59  750 mg





  DAILY NAYAN   Administration


 


Levothyroxine Sodium  0.025 mg  12/27/19 06:00 





  Synthroid 0.025 Mg Tablet  PO  01/26/20 05:59 





  Q6AM NAYAN  


 


Magnesium Hydroxide  30 ml  12/25/19 22:33 





  Milk Of Magnesia 30 Ml Udcup  PO  01/24/20 22:32 





  HSP PRN  





  FOR CONSTIPATION  


 


Metoprolol Tartrate  5 mg  12/26/19 22:17  12/26/19 22:31





  Lopressor Inj/Pf 5 Mg/5 Ml Sdv  IV  01/25/20 22:16  5 mg





  Q4HP PRN   Administration





  Give For Sbp > 160 / Dbp > 100  


 


Morphine Sulfate  2 mg  12/25/19 22:39  12/26/19 02:45





  Morphine 10 Mg/Ml Inj  IV  01/01/20 22:38  2 mg





  Q1HP PRN   Administration





  Acute Severe Dyspnea  


 


Promethazine HCl  12.5 mg  12/26/19 09:00 





  Phenergan Inj 25 Mg/1 Ml Vial  IV  01/24/20 22:32 





  Q4HP PRN  





  FOR NAUSEA/VOMITING  


 


Sacubitril/Valsartan  1 tab  12/27/19 10:00 





  Entresto 24 Mg/26 Mg Tablet [started by me]  PO  01/26/20 09:59 





  BID NAYAN  


 


Sodium Chloride  2.5 ml  12/26/19 06:00  12/26/19 21:22





  Saline Flush 2.5 Ml Monoject Prefil Syrin  IV  01/25/20 05:59  2.5 ml





  Q8 NAYAN   Administration














Discontinued Medications














Generic Name Dose Route Start Last Admin





  Trade Name Freq  PRN Reason Stop Dose Admin


 


Azithromycin  500 mg  12/25/19 21:49  12/25/19 22:02





  Zithromax Inj 500 Mg Vial  IV  12/25/19 21:50  500 mg





  IVBAG (ED) ONE   Administration


 


Docusate Sodium  100 mg  12/26/19 10:00 





  Colace Udc 100 Mg/10 Ml Oral Soln  PO  01/25/20 09:59 





  BID NAYAN  


 


Famotidine  20 mg  12/25/19 23:00  12/26/19 00:03





  Pepcid 20 Mg Tablet  PO  12/25/19 23:01  20 mg





  NOW ONE   Administration


 


Furosemide  40 mg  12/25/19 21:49  12/25/19 22:02





  Lasix Inj/Pf 20 Mg/2 Ml Sdv  IV  12/25/19 21:50  40 mg





  NOW ONE   Administration


 


Ceftriaxone Sodium/Dextrose  1 gm in 50 mls @ 100 mls/hr  12/25/19 21:49  

12/26/19 00:35





  Rocephin Rtu 1 Gm/D5w 50 Ml Premix  IV  12/25/19 22:18  Infused





  NOW ONE   Infusion


 


Promethazine HCl  12.5 mg  12/25/19 22:33  12/26/19 05:41





  Phenergan Inj 25 Mg/1 Ml Vial  IV  01/24/20 22:32  12.5 mg





  Q4HP PRN   Administration





  FOR NAUSEA/VOMITING  











Review of Systems


Constitutional: ABSENT: chills, fever(s)


Eyes: ABSENT: visual disturbances, other - Eye pain


Ears: ABSENT: hearing changes, other - Ear pain


Nose, Mouth, and Throat: ABSENT: headache(s), mouth pain, sore throat


Cardiovascular: PRESENT: as per HPI, dyspnea on exertion, edema.  ABSENT: chest 

pain, orthropnea, palpitations


Respiratory: PRESENT: as per HPI, cough, dyspnea, sputum


Gastrointestinal: ABSENT: abdominal pain, constipation, diarrhea, nausea, 

vomiting


Genitourinary: ABSENT: dysuria, hematuria


Musculoskeletal: PRESENT: other - Frequent nocturnal leg cramps.  ABSENT: back 

pain, joint swelling, muscle weakness


Integumentary: ABSENT: pruritus, rash


Neurological: ABSENT: confusion, convulsions, focal weakness, memory loss, 

syncope


Psychiatric: ABSENT: anxiety, depression


Endocrine: ABSENT: cold intolerance, heat intolerance


Hematologic/Lymphatic: ABSENT: easy bleeding, easy bruising


Allergic/Immunologic: ABSENT: seasonal rhinorrhea.








PHYSICAL EXAMINATION: The patient is morbidly obese.  In mild respiratory 

distress.  She becomes short of breath speaking a few sentences.  She is well-

groomed.


                                                                Selected Entries











  12/26/19 12/26/19





  16:04 17:05


 


Temperature 98.0 F 


 


Temperature Oral 





Source  


 


Pulse Rate 128 H 


 


Respiratory 20 





Rate  


 


Blood Pressure 130/83 H 


 


Blood Pressure 98 





Mean  


 


BP Location Left Arm 


 


BP Position Sitting 


 


O2 Sat by Pulse 100 





Oximetry  


 


Fraction of  28





Inspired Oxygen  





(FIO2)  


 


Oxygen Flow 2.00 





Rate  


 


Oxygen Delivery Nasal Cannula 





Method  





HEAD: Is atraumatic.  Normocephalic.  EYES: Pupils are equal round regular 

reactive to light and accommodation.  Extraocular movements are normal there is 

no conjunctival pallor.  There is no scleral icterus.  EARS: Tympanic membranes 

are intact.  External auditory canals are clear.  NOSE: There is no deviated 

nasal septum.  There is no inflammation of the nasal mucous membrane.  MOUTH: 

There is no ulcers in the mouth.  Mucous membranes of the mouth are moist.  

THROAT: There is no redness of the oropharynx.  There is no exudates.  SKIN: 

There is no skin rashes.  There is no petechia or ecchymosis.  NECK: Is supple. 

There is mild JVD present.  Carotids are equal there is no bruit.  There is no 

lymphadenopathy.  There is no goiter.  There is no accessory muscle respiration 

use.  Trachea is central.  LUNGS: Shows diminished air entry prolonged 

expiration.  There is scattered end expiratory wheezing and rhonchi.  There is 

"Velcro" [E] rales of pulmonary fibrosis bilaterally in the bases.  There is a 

few fine rales of CHF.  HEART: S1-S2 is heard.  There is no S3 gallop.  There is

 no S4 gallop.  There is systolic murmur of tricuspid regurgitation and mild 

mitral regurgitation murmur present.  There is no S3 gallop.  There is no S4 

gallop.  There is no rub.  ABDOMEN: Is obese.  Nontender.  There is no stan

conor megaly.  Bowel sounds are well heard.  EXTREMITIES: Femorals are deep.  

Femorals are diminished there is no femoral bruits.  Leg pulses are diminished. 

 There is 1+ bilateral edema.  There is no DVT or cellulitis.  There is no 

cyanosis or clubbing.  There is no calf tenderness.  CNS: The patient is 

conscious awake alert oriented x3 with no focal deficit.  PSYCHIATRIC:.  The 

patient judgment insight are intact her affect is normal.





                               Labs- Entire Visit











  12/25/19 12/25/19 12/25/19





  20:45 20:45 20:45


 


WBC  5.7  


 


RBC  3.29 L  


 


Hgb  10.6 L  


 


Hct  31.6 L  


 


MCV  96  


 


MCH  32.1  


 


MCHC  33.5  


 


RDW  14.8 H  


 


Plt Count  195  


 


Lymph % (Auto)  19.3  


 


Mono % (Auto)  10.0  


 


Eos % (Auto)  1.7  


 


Baso % (Auto)  0.3  


 


Absolute Neuts (auto)  3.9  


 


Absolute Lymphs (auto)  1.1  


 


Absolute Monos (auto)  0.6  


 


Absolute Eos (auto)  0.1  


 


Absolute Basos (auto)  0.0  


 


Seg Neutrophils %  68.7  


 


VBG pH   


 


VBG pCO2   


 


VBG HCO3   


 


VBG Base Excess   


 


Sodium   141.4 


 


Potassium   4.7 


 


Chloride   97 L 


 


Carbon Dioxide   37 H 


 


Anion Gap   7 


 


BUN   28 H 


 


Creatinine   0.82 


 


Est GFR ( Amer)   > 60 


 


Est GFR (MDRD) Non-Af   > 60 


 


Glucose   110 


 


POC Glucose   


 


Lactic Acid   


 


Calcium   9.1 


 


Magnesium   


 


Total Bilirubin   0.4 


 


Direct Bilirubin   0.1 


 


Neonat Total Bilirubin   Not Reportable 


 


Neonat Direct Bilirubin   Not Reportable 


 


Neonat Indirect Bili   Not Reportable 


 


AST   22 


 


ALT   17 


 


Alkaline Phosphatase   87 


 


Creatine Kinase   46 


 


Troponin I    0.025


 


NT-Pro-B Natriuret Pep    2580 H


 


Total Protein   6.6 


 


Albumin   3.6 


 


Triglycerides   


 


Cholesterol   


 


LDL Cholesterol Direct   


 


VLDL Cholesterol   


 


HDL Cholesterol   


 


TSH   














  12/25/19 12/25/19 12/26/19





  21:58 23:14 05:05


 


WBC   


 


RBC   


 


Hgb   


 


Hct   


 


MCV   


 


MCH   


 


MCHC   


 


RDW   


 


Plt Count   


 


Lymph % (Auto)   


 


Mono % (Auto)   


 


Eos % (Auto)   


 


Baso % (Auto)   


 


Absolute Neuts (auto)   


 


Absolute Lymphs (auto)   


 


Absolute Monos (auto)   


 


Absolute Eos (auto)   


 


Absolute Basos (auto)   


 


Seg Neutrophils %   


 


VBG pH   


 


VBG pCO2   


 


VBG HCO3   


 


VBG Base Excess   


 


Sodium   


 


Potassium   


 


Chloride   


 


Carbon Dioxide   


 


Anion Gap   


 


BUN   


 


Creatinine   


 


Est GFR ( Amer)   


 


Est GFR (MDRD) Non-Af   


 


Glucose   


 


POC Glucose   


 


Lactic Acid  0.5 L  


 


Calcium   


 


Magnesium   


 


Total Bilirubin   


 


Direct Bilirubin   


 


Neonat Total Bilirubin   


 


Neonat Direct Bilirubin   


 


Neonat Indirect Bili   


 


AST   


 


ALT   


 


Alkaline Phosphatase   


 


Creatine Kinase   


 


Troponin I   0.027  0.027


 


NT-Pro-B Natriuret Pep   


 


Total Protein   


 


Albumin   


 


Triglycerides   


 


Cholesterol   


 


LDL Cholesterol Direct   


 


VLDL Cholesterol   


 


HDL Cholesterol   


 


TSH   














  12/26/19 12/26/19 12/26/19





  05:05 05:05 05:05


 


WBC  4.7  


 


RBC  3.19 L  


 


Hgb  10.3 L  


 


Hct  30.4 L  


 


MCV  95  


 


MCH  32.3  


 


MCHC  33.8  


 


RDW  14.5 H  


 


Plt Count  169  


 


Lymph % (Auto)   


 


Mono % (Auto)   


 


Eos % (Auto)   


 


Baso % (Auto)   


 


Absolute Neuts (auto)   


 


Absolute Lymphs (auto)   


 


Absolute Monos (auto)   


 


Absolute Eos (auto)   


 


Absolute Basos (auto)   


 


Seg Neutrophils %   


 


VBG pH   


 


VBG pCO2   


 


VBG HCO3   


 


VBG Base Excess   


 


Sodium   141.4 


 


Potassium   4.1 


 


Chloride   98 


 


Carbon Dioxide   38 H 


 


Anion Gap   5 


 


BUN   26 H 


 


Creatinine   0.70 


 


Est GFR ( Amer)   > 60 


 


Est GFR (MDRD) Non-Af   > 60 


 


Glucose   110 


 


POC Glucose   


 


Lactic Acid   


 


Calcium   8.8 


 


Magnesium   1.9 


 


Total Bilirubin   


 


Direct Bilirubin   


 


Neonat Total Bilirubin   


 


Neonat Direct Bilirubin   


 


Neonat Indirect Bili   


 


AST   


 


ALT   


 


Alkaline Phosphatase   


 


Creatine Kinase   


 


Troponin I   


 


NT-Pro-B Natriuret Pep   


 


Total Protein   


 


Albumin   


 


Triglycerides   78 


 


Cholesterol   117.26 


 


LDL Cholesterol Direct   52 


 


VLDL Cholesterol   16.0 


 


HDL Cholesterol   48 


 


TSH    7.47 H














  12/26/19 12/26/19 12/26/19





  05:05 08:33 11:08


 


WBC   


 


RBC   


 


Hgb   


 


Hct   


 


MCV   


 


MCH   


 


MCHC   


 


RDW   


 


Plt Count   


 


Lymph % (Auto)   


 


Mono % (Auto)   


 


Eos % (Auto)   


 


Baso % (Auto)   


 


Absolute Neuts (auto)   


 


Absolute Lymphs (auto)   


 


Absolute Monos (auto)   


 


Absolute Eos (auto)   


 


Absolute Basos (auto)   


 


Seg Neutrophils %   


 


VBG pH  7.38  


 


VBG pCO2  66.1 H*  


 


VBG HCO3  38.5 H  


 


VBG Base Excess  10.9  


 


Sodium   


 


Potassium   


 


Chloride   


 


Carbon Dioxide   


 


Anion Gap   


 


BUN   


 


Creatinine   


 


Est GFR ( Amer)   


 


Est GFR (MDRD) Non-Af   


 


Glucose   


 


POC Glucose   113 H 


 


Lactic Acid   


 


Calcium   


 


Magnesium   


 


Total Bilirubin   


 


Direct Bilirubin   


 


Neonat Total Bilirubin   


 


Neonat Direct Bilirubin   


 


Neonat Indirect Bili   


 


AST   


 


ALT   


 


Alkaline Phosphatase   


 


Creatine Kinase   


 


Troponin I    0.024


 


NT-Pro-B Natriuret Pep   


 


Total Protein   


 


Albumin   


 


Triglycerides   


 


Cholesterol   


 


LDL Cholesterol Direct   


 


VLDL Cholesterol   


 


HDL Cholesterol   


 


TSH   














  12/26/19 12/26/19 12/26/19





  11:16 16:03 21:09


 


WBC   


 


RBC   


 


Hgb   


 


Hct   


 


MCV   


 


MCH   


 


MCHC   


 


RDW   


 


Plt Count   


 


Lymph % (Auto)   


 


Mono % (Auto)   


 


Eos % (Auto)   


 


Baso % (Auto)   


 


Absolute Neuts (auto)   


 


Absolute Lymphs (auto)   


 


Absolute Monos (auto)   


 


Absolute Eos (auto)   


 


Absolute Basos (auto)   


 


Seg Neutrophils %   


 


VBG pH   


 


VBG pCO2   


 


VBG HCO3   


 


VBG Base Excess   


 


Sodium   


 


Potassium   


 


Chloride   


 


Carbon Dioxide   


 


Anion Gap   


 


BUN   


 


Creatinine   


 


Est GFR ( Amer)   


 


Est GFR (MDRD) Non-Af   


 


Glucose   


 


POC Glucose  189 H  78  154 H


 


Lactic Acid   


 


Calcium   


 


Magnesium   


 


Total Bilirubin   


 


Direct Bilirubin   


 


Neonat Total Bilirubin   


 


Neonat Direct Bilirubin   


 


Neonat Indirect Bili   


 


AST   


 


ALT   


 


Alkaline Phosphatase   


 


Creatine Kinase   


 


Troponin I   


 


NT-Pro-B Natriuret Pep   


 


Total Protein   


 


Albumin   


 


Triglycerides   


 


Cholesterol   


 


LDL Cholesterol Direct   


 


VLDL Cholesterol   


 


HDL Cholesterol   


 


TSH   








                                        





Chest X-Ray  12/25/19 19:52


IMPRESSION:


 


Equivocal right basilar airspace opacity. There is elevation


right hemidiaphragm. Cardiac megaly


 


 


ECHOCARDIOGRAM: Is a poor quality study.  There is mild left ventricular 

enlargement.  LV ejection fraction is severely reduced to moderately reduced at 

30% to 35%.  There is mild mitral regurgitation.  There is no aortic stenosis.  

There is no significant aortic regurgitation.  There is no pericardial effusion.

  In one view there is moderate to severe tricuspid regurgitation, but there is 

another sampling of the TR jet and hence erroneously low RVSP obtained.  Will 

have to repeat the tricuspid valve reinterrogation by echocardiogram to obtain 

accurate right ventricular systolic pressure estimations.





EKG shows sinus tachycardia.  Versus atrial flutter.  Right bundle branch block 

pattern and left anterior hemiblock.  Probable LVH with strain pattern.





Impression/RECOMMENDATION:





1.  Acute on chronic respiratory failure.  Continue supplemental oxygen, and 

current anti-COPD medication


2.  Acute infective bronchitis: Continue current treatment including 

antibiotics.


3.  Most likely acute on chronic systolic heart failure: Continue diuretics, in 

view of the patient's current current wheezing will not start the patient on 

Toprol-XL at present.  We will start the patient on Entresto.  If tachycardia 

persists will give 1 dose of digoxin.


4 .  Pulmonary fibrosis.


5.  History of asthma and COPD.


6.  Hypertension: Continue current medication.  Increase Entresto as tolerated. 

 Later would add a beta-blocker.


7.  Sinus tach versus atrial flutter.  There is also bifascicular block.  Once 

the heart rate slows down we will know whether this is sinus tach or atrial 

flutter.


8.  Diabetes mellitus type 2 non-insulin-dependent.


9.  Hypothyroidism:: Continue thyroid replacement.  Note the patient's TSH is 

high.  Recommend checking the patient's free T3 and free T4..  


10 I suspect significant pulmonary hypertension.  Hence will repeat 

interrogation of the tricuspid valve to see if we can get better estimation of 

the TR jet to be able to calculate right ventricle systolic pressure accurately.


11.  History of pulmonary embolism: Patient states she is on Xarelto.  Will 

continue Xarelto.  


12.  History of breast cancer status post bilateral mastectomies and 

chemotherapy and radiation treatment.


13.  History of multiple abdominal surgeries.


14.  Morbid obesity.








Discussed echo findings with the patient and patient's daughter.  Medical 

decision making is of high complexity.  Medications reviewed.  Medications 

added.  Medical regimen and medical management discussed with attending provider

 on the case.  60 minutes spent as patient with more than 50% of time spent in 

direct patient care.  Office note from Dr. Bolivar reviewed.

## 2019-12-26 NOTE — XCELERA REPORT
[address] [address]

                               Tel: [phone]

                               Fax: [phone]



                      Transthoracic Echocardiogram Report

_______________________________________________________________________________



Name: SHAYAN BUSH

MRN: [id number]                           Age: 79 yrs

Gender: Female                            : 1940

Patient Status: Inpatient                 Patient Location: [address] [place], [place] [address]

Account #: [id number]

Study Date: 2019 09:51 AM

Accession #: [id number]

_______________________________________________________________________________



Height: 63 in        Weight: 243 lb        BSA: 2.1 m2

_______________________________________________________________________________

Procedure: A two-dimensional transthoracic echocardiogram with color flow and

Doppler was performed. Study Quality: Poor.

Reason For Study: Acute on chronic congestive heart failure



History: Acute on chronic congestive heart failure.

Ordering Physician: WEILAND, PAUL



Performed By: Hoda Panchal

_______________________________________________________________________________



Interpretation Summary

RECOMMEND LIMITED FOLLOW UP TR JET MEASUREMENT AND RVVSP. The left ventricle

is mildly dilated.

LV EF is 30% t0 35%

Left ventricular systolic function is moderate to severely reduced.

Doppler measurements suggest pseudonormalized left ventricular relaxation,

which is associated with grade II/IV or mild to moderate diastolic dysfunction

There is moderate to severe global hypokinesis of the left ventricle.

There is no thrombus.

Cannot asssess ASD ,VSD ,or PFO.

The right ventricle is moderately dilated.

The right ventricle is not well visualized secondary to technical limitations

The right ventricular systolic function is moderately reduced.

The right atrium is moderately dilated.

The left atrium is mildly dilated.

There is no evidence of mitral valve prolapse.

There is no vegetation seen on the mitral valve.

There is no mitral valve stenosis.

There is no aortic valvular vegetation.

There is no aortic valve stenosis

There is no LVOT obstruction.

No aortic regurgitation is present.

There is no tricuspid stenosis.

In 1 view there seems to be moderate to severe TR.But TR max pressure gradient

is only 22 .3 mm of HG , for a calculated RVSP of only 32 mm of Hg , with RA

mean of 10.Strongly suspect undersampling of the TR jet.RECOMMEND LIMITED

FOLLOW UP TR JET MEASUREMENT AND RVVSP.

There is no pulmonic valvular stenosis.

There is a trace amount of pulmonic regurgitation

The aortic root is normal size.

There is no pericardial effusion.

RECOMMEND LIMITED FOLLOW UP TR JET MEASUREMENT AND RVVSP.



MMode/2D Measurements & Calculations

RVDd: 4.2 cm  LVIDd: 5.1 cm   FS: 29.5 %              Ao root diam: 3.2 cm



IVSd: 1.0 cm  LVIDs: 3.6 cm   EDV(Teich): 121.7 ml    Ao root area: 8.1 cm2

              LVPWd: 0.97 cm  ESV(Teich): 53.3 ml

                              EF(Teich): 56.2 %



Doppler Measurements & Calculations

MV E max iraida:       MV dec slope:        Ao V2 max:         LV V1 max P.9 cm/sec         810.9 cm/sec2        119.3 cm/sec       2.0 mmHg

MV A max iraida:       MV dec time: 0.12 secAo max P.7 mmHgLV V1 max:

62.7 cm/sec                                                 70.5 cm/sec

MV E/A: 1.6

        _______________________________________________________________

PA V2 max:          PI end-d iraida:        TR max iraida:

80.6 cm/sec         137.1 cm/sec         235.9 cm/sec

PA max P.6 mmHg                      TR max P.3 mmHg





Left Ventricle

The left ventricle is mildly dilated. There is normal left ventricular wall

thickness. LV EF is 30% t0 35%. Left ventricular systolic function is moderate

to severely reduced. Doppler measurements suggest pseudonormalized left

ventricular relaxation, which is associated with grade II/IV or mild to

moderate diastolic dysfunction. There is moderate to severe global hypokinesis

of the left ventricle. There is no thrombus. Cannot asssess ASD ,VSD ,or PFO.



Right Ventricle

The right ventricle is moderately dilated. The right ventricle is not well

visualized secondary to technical limitations. The right ventricular systolic

function is moderately reduced.



Atria

The right atrium is moderately dilated. The left atrium is mildly dilated.



Mitral Valve

There is no evidence of mitral valve prolapse. There is no vegetation seen on

the mitral valve. There is no mitral valve stenosis. There is a mild amount of

mitral regurgitation.





Aortic Valve

There is no aortic valvular vegetation. There is no aortic valve stenosis.

There is no LVOT obstruction. There is aortic sclerosis without aortic

stenosis. No aortic regurgitation is present.



Tricuspid Valve

There is no tricuspid stenosis. In 1 view there seems to be moderate to severe

TR.But TR max pressure gradient is only 22 .3 mm of HG , for a calculated RVSP

of only 32 mm of Hg , with RA mean of 10.Strongly suspect undersampling of the

TR jet.RECOMMEND LIMITED FOLLOW UP TR JET MEASUREMENT AND RVVSP.



Pulmonic Valve

There is no pulmonic valvular stenosis. There is a trace amount of pulmonic

regurgitation.



Great Vessels

The aortic root is normal size. The inferior vena cava appeared normal and

decreased < 50% with respiration (RAP 10-15 mmHg).





Effusions

There is no pericardial effusion.



_______________________________________________________________________________

_______________________________________________________________________________



Electronically signed by:      Nuvia Abdul      on 2019 05:30 PM



CC: WEILAND, PAUL Narasimhan, Lakshmi

## 2019-12-26 NOTE — PDOC PROGRESS REPORT
Subjective


Progress Note for:: 12/26/19


Subjective:: 





12/26/2019-no complaints this a.m.


Reason For Visit: 


ACUTE ON CHRONIC DISTOLIC CONGESTIVE HEART FAILURE








Physical Exam


Vital Signs: 


                                        











Temp Pulse Resp BP Pulse Ox


 


 97.9 F   83   16   132/73 H  98 


 


 12/26/19 03:22  12/26/19 07:56  12/26/19 07:56  12/26/19 03:22  12/26/19 07:56








                                 Intake & Output











 12/25/19 12/26/19 12/27/19





 06:59 06:59 06:59


 


Intake Total  50 


 


Output Total  175 


 


Balance  -125 


 


Weight  108.6 kg 











General appearance: PRESENT: no acute distress, well-developed, well-nourished


Neck exam: ABSENT: carotid bruit, JVD, lymphadenopathy, thyromegaly


Respiratory exam: PRESENT: decreased breath sounds, symmetrical, unlabored, 

wheezes


Cardiovascular exam: PRESENT: RRR.  ABSENT: diastolic murmur, rubs, systolic 

murmur


Pulses: PRESENT: +1 pedal pulses bilateral


Vascular exam: PRESENT: normal capillary refill


GI/Abdominal exam: PRESENT: normal bowel sounds, soft.  ABSENT: distended, 

guarding, mass, organolmegaly, rebound, tenderness


Extremities exam: PRESENT: full ROM, pedal edema, +2 edema.  ABSENT: calf 

tenderness, clubbing


Neurological exam: PRESENT: alert, awake, oriented to person, oriented to place,

oriented to time, oriented to situation, CN II-XII grossly intact.  ABSENT: 

motor sensory deficit


Psychiatric exam: PRESENT: appropriate affect, normal mood.  ABSENT: homicidal 

ideation, suicidal ideation


Skin exam: PRESENT: dry, intact, warm.  ABSENT: cyanosis, rash





Results


Laboratory Results: 


                                        





                                 12/26/19 05:05 





                                 12/26/19 05:05 





                                        











  12/25/19 12/25/19 12/25/19





  20:45 20:45 21:58


 


WBC  5.7  


 


RBC  3.29 L  


 


Hgb  10.6 L  


 


Hct  31.6 L  


 


MCV  96  


 


MCH  32.1  


 


MCHC  33.5  


 


RDW  14.8 H  


 


Plt Count  195  


 


Seg Neutrophils %  68.7  


 


VBG pH   


 


VBG pCO2   


 


VBG HCO3   


 


VBG Base Excess   


 


Sodium   141.4 


 


Potassium   4.7 


 


Chloride   97 L 


 


Carbon Dioxide   37 H 


 


Anion Gap   7 


 


BUN   28 H 


 


Creatinine   0.82 


 


Est GFR ( Amer)   > 60 


 


Glucose   110 


 


Lactic Acid    0.5 L


 


Calcium   9.1 


 


Magnesium   


 


Total Bilirubin   0.4 


 


AST   22 


 


Alkaline Phosphatase   87 


 


Total Protein   6.6 


 


Albumin   3.6 


 


Triglycerides   


 


Cholesterol   


 


LDL Cholesterol Direct   


 


VLDL Cholesterol   


 


HDL Cholesterol   


 


TSH   














  12/26/19 12/26/19 12/26/19





  05:05 05:05 05:05


 


WBC  4.7  


 


RBC  3.19 L  


 


Hgb  10.3 L  


 


Hct  30.4 L  


 


MCV  95  


 


MCH  32.3  


 


MCHC  33.8  


 


RDW  14.5 H  


 


Plt Count  169  


 


Seg Neutrophils %   


 


VBG pH   


 


VBG pCO2   


 


VBG HCO3   


 


VBG Base Excess   


 


Sodium   141.4 


 


Potassium   4.1 


 


Chloride   98 


 


Carbon Dioxide   38 H 


 


Anion Gap   5 


 


BUN   26 H 


 


Creatinine   0.70 


 


Est GFR ( Amer)   > 60 


 


Glucose   110 


 


Lactic Acid   


 


Calcium   8.8 


 


Magnesium   1.9 


 


Total Bilirubin   


 


AST   


 


Alkaline Phosphatase   


 


Total Protein   


 


Albumin   


 


Triglycerides   78 


 


Cholesterol   117.26 


 


LDL Cholesterol Direct   52 


 


VLDL Cholesterol   16.0 


 


HDL Cholesterol   48 


 


TSH    7.47 H














  12/26/19





  05:05


 


WBC 


 


RBC 


 


Hgb 


 


Hct 


 


MCV 


 


MCH 


 


MCHC 


 


RDW 


 


Plt Count 


 


Seg Neutrophils % 


 


VBG pH  7.38


 


VBG pCO2  66.1 H*


 


VBG HCO3  38.5 H


 


VBG Base Excess  10.9


 


Sodium 


 


Potassium 


 


Chloride 


 


Carbon Dioxide 


 


Anion Gap 


 


BUN 


 


Creatinine 


 


Est GFR (African Amer) 


 


Glucose 


 


Lactic Acid 


 


Calcium 


 


Magnesium 


 


Total Bilirubin 


 


AST 


 


Alkaline Phosphatase 


 


Total Protein 


 


Albumin 


 


Triglycerides 


 


Cholesterol 


 


LDL Cholesterol Direct 


 


VLDL Cholesterol 


 


HDL Cholesterol 


 


TSH 








                                        











  12/25/19 12/25/19 12/25/19





  20:45 20:45 23:14


 


Creatine Kinase  46  


 


Troponin I   0.025  0.027


 


NT-Pro-B Natriuret Pep   2580 H 














  12/26/19





  05:05


 


Creatine Kinase 


 


Troponin I  0.027


 


NT-Pro-B Natriuret Pep 











Impressions: 


                                        





Chest X-Ray  12/25/19 19:52


IMPRESSION:


 


Equivocal right basilar airspace opacity. There is elevation


right hemidiaphragm. Cardiac megaly


 


 


copyright 2011 [employer]- All Rights Reserved


 














Assessment and Plan





- Diagnosis


(1) Acute and chronic respiratory failure with hypoxia


Is this a current diagnosis for this admission?: Yes   


Plan: 


12/26/2019-this is actually acute on chronic respiratory failure with hypoxia 

and hypercapnia.  CO2 66.  Will have patient wear BiPAP per RT settings.  

Continue all other supportive measures including submental oxygen.  Diuresis.








(2) Acute on chronic diastolic congestive heart failure


Is this a current diagnosis for this admission?: Yes   


Plan: 


12/26/2019-continue Bumex 1 mg IV every 6 hours and as needed morphine.  BiPAP.








(3) Chronic obstructive pulmonary disease (COPD)


Qualifiers: 


   COPD type: unspecified COPD   Qualified Code(s): J44.9 - Chronic obstructive 

pulmonary disease, unspecified   


Is this a current diagnosis for this admission?: Yes   


Plan: 


12/26/2019-supportive measures, BiPAP, pulmonary toileting, bronchodilators 

steroids Levaquin 750 mg daily.








(4) Diabetes mellitus type 2 in obese


Is this a current diagnosis for this admission?: Yes   


Plan: 


12/26/2019-stable at this time continue current therapies








(5) Morbid obesity with BMI of 40.0-44.9, adult


Is this a current diagnosis for this admission?: Yes   


Plan: 


12/26/2019-continue education about dietary modification








(6) Normochromic normocytic anemia


Is this a current diagnosis for this admission?: Yes   


Plan: 


12/26/2019-stable








- Plan Summary


Summary: 


Patient is admitted to a telemetry bed where she will receive usual supportive 

and symptomatic cares.  She will be treated with IV Bumex 1 mg every 6 hours to 

help resolve her acute congestive heart failure.  She will also receive 

supplemental oxygen utilizing nasal cannula and or noninvasive airway pressure 

support devices such as CPAP or BiPAP to obtain an adequate oxygen saturation 

and avoid hypercapnia.  She will receive morphine sulfate 2 mg IV every 1 hour 

PRN severe dyspnea.  She will receive a pulmonary toilet utilizing Atrovent and 

Pulmicort and will be maintained on antibiotic therapy for possible 

pneumonia/pneumonitis with Levaquin 750 mg p.o. daily x4 days.  Patient will be 

continued on her usual home medications as soon as her medication reconciliation

has been verified and completed.  A nutritionist consultation will be obtained 

to  the patient on her obesity and possible diet interventions.  Patient 

will be continued on her cardiac and diabetic restricted diet.  Before meals and

at bedtime Accu-Cheks will be obtained with sliding scale insulin used for 

control of hyperglycemia and a hypoglycemic protocol in place.





- Time


Time Spent with patient: 15-24 minutes





- Inpatient Certification


Based on my medical assessment, after consideration of the patient's 

comorbidities, presenting symptoms, or acuity I expect that the services needed 

warrant INPATIENT care.: Yes


I certify that my determination is in accordance with my understanding of 

Medicare's requirements for reasonable and necessary INPATIENT services [42 CFR 

412.3e].: Yes


Medical Necessity: Significant Comorbidiites Make Outpatient Treatment Too 

Risky, Need Close Monitoring Due to Risk of Patient Decompensation, Other - IV 

diuresis

## 2019-12-27 LAB
ANION GAP SERPL CALC-SCNC: 6 MMOL/L (ref 5–19)
BASE EXCESS BLDV CALC-SCNC: 15.5 MMOL/L
BUN SERPL-MCNC: 19 MG/DL (ref 7–20)
CALCIUM: 8.9 MG/DL (ref 8.4–10.2)
CHLORIDE SERPL-SCNC: 93 MMOL/L (ref 98–107)
CO2 SERPL-SCNC: 42 MMOL/L (ref 22–30)
ERYTHROCYTE [DISTWIDTH] IN BLOOD BY AUTOMATED COUNT: 14.8 % (ref 11.5–14)
GLUCOSE SERPL-MCNC: 92 MG/DL (ref 75–110)
HCO3 BLDV-SCNC: 43.8 MMOL/L (ref 20–32)
HCT VFR BLD CALC: 31.1 % (ref 36–47)
HGB BLD-MCNC: 10.5 G/DL (ref 12–15.5)
MCH RBC QN AUTO: 32.1 PG (ref 27–33.4)
MCHC RBC AUTO-ENTMCNC: 33.7 G/DL (ref 32–36)
MCV RBC AUTO: 95 FL (ref 80–97)
PCO2 BLDV: 74.1 MMHG (ref 35–63)
PH BLDV: 7.39 [PH] (ref 7.3–7.42)
PLATELET # BLD: 179 10^3/UL (ref 150–450)
POTASSIUM SERPL-SCNC: 3.8 MMOL/L (ref 3.6–5)
RBC # BLD AUTO: 3.26 10^6/UL (ref 3.72–5.28)
WBC # BLD AUTO: 4.4 10^3/UL (ref 4–10.5)

## 2019-12-27 RX ADMIN — LEVOFLOXACIN SCH MG: 750 TABLET, FILM COATED ORAL at 09:28

## 2019-12-27 RX ADMIN — BUDESONIDE SCH MG: 0.5 SUSPENSION RESPIRATORY (INHALATION) at 20:27

## 2019-12-27 RX ADMIN — PREGABALIN SCH MG: 100 CAPSULE ORAL at 14:19

## 2019-12-27 RX ADMIN — CYCLOBENZAPRINE HYDROCHLORIDE PRN MG: 10 TABLET, FILM COATED ORAL at 14:19

## 2019-12-27 RX ADMIN — SACUBITRIL AND VALSARTAN SCH TAB: 24; 26 TABLET, FILM COATED ORAL at 09:28

## 2019-12-27 RX ADMIN — INSULIN HUMAN SCH: 100 INJECTION, SOLUTION PARENTERAL at 21:44

## 2019-12-27 RX ADMIN — DOCUSATE SODIUM SCH MG: 100 CAPSULE, LIQUID FILLED ORAL at 09:29

## 2019-12-27 RX ADMIN — INSULIN HUMAN SCH: 100 INJECTION, SOLUTION PARENTERAL at 14:25

## 2019-12-27 RX ADMIN — INSULIN HUMAN SCH: 100 INJECTION, SOLUTION PARENTERAL at 09:21

## 2019-12-27 RX ADMIN — BUMETANIDE SCH MG: 0.25 INJECTION INTRAMUSCULAR; INTRAVENOUS at 00:37

## 2019-12-27 RX ADMIN — Medication SCH ML: at 14:23

## 2019-12-27 RX ADMIN — SACUBITRIL AND VALSARTAN SCH TAB: 24; 26 TABLET, FILM COATED ORAL at 18:29

## 2019-12-27 RX ADMIN — BUDESONIDE SCH MG: 0.5 SUSPENSION RESPIRATORY (INHALATION) at 08:02

## 2019-12-27 RX ADMIN — DOCUSATE SODIUM SCH MG: 100 CAPSULE, LIQUID FILLED ORAL at 18:26

## 2019-12-27 RX ADMIN — INSULIN HUMAN SCH: 100 INJECTION, SOLUTION PARENTERAL at 17:56

## 2019-12-27 RX ADMIN — IPRATROPIUM BROMIDE SCH MG: 0.5 SOLUTION RESPIRATORY (INHALATION) at 16:23

## 2019-12-27 RX ADMIN — LEVOTHYROXINE SODIUM SCH MG: 25 TABLET ORAL at 06:05

## 2019-12-27 RX ADMIN — FAMOTIDINE SCH MG: 20 TABLET, FILM COATED ORAL at 09:28

## 2019-12-27 RX ADMIN — PREGABALIN SCH MG: 100 CAPSULE ORAL at 21:47

## 2019-12-27 RX ADMIN — HEPARIN SODIUM SCH UNIT: 5000 INJECTION, SOLUTION INTRAVENOUS; SUBCUTANEOUS at 06:05

## 2019-12-27 RX ADMIN — Medication SCH ML: at 21:48

## 2019-12-27 RX ADMIN — IPRATROPIUM BROMIDE SCH MG: 0.5 SOLUTION RESPIRATORY (INHALATION) at 08:02

## 2019-12-27 RX ADMIN — BUMETANIDE SCH MG: 0.25 INJECTION INTRAMUSCULAR; INTRAVENOUS at 06:05

## 2019-12-27 RX ADMIN — Medication SCH ML: at 06:05

## 2019-12-27 RX ADMIN — FAMOTIDINE SCH MG: 20 TABLET, FILM COATED ORAL at 21:47

## 2019-12-27 RX ADMIN — RIVAROXABAN SCH MG: 10 TABLET, FILM COATED ORAL at 18:27

## 2019-12-27 NOTE — PROGRESS NOTE
Provider Note


Provider Note: 


CARDIOLOGY PROGRESS NOTE by Dr. Nuvia Abdul on 12/27/2019.





SUBJECTIVE: The patient states she feels a little better.  She still has some 

orthopnea but no PND.  There is no leg edema.  There is no arrhythmia seen on 

the monitor.  The patient denies any chest pain discomfort.  She continues to 

have wheezing and leathery rales.  There is no bleeding on Xarelto.  There is no

TIA CVA symptoms.





Past physical EXAMINATION: The patient morbidly obese.  At present in no acute 

distress.


                                                                Selected Entries











  12/27/19





  07:54


 


Temperature 97.7 F


 


Temperature Oral





Source 


 


Pulse Rate 125 H


 


Respiratory 18





Rate 


 


Blood Pressure 130/95 H


 


Blood Pressure 106





Mean 


 


BP Location Left Arm


 


BP Position Supine


 


O2 Sat by Pulse 100





Oximetry 


 


Oxygen Flow 2.50





Rate 


 


Oxygen Delivery Nasal Cannula





Method 








HEAD: Is atraumatic.  Normocephalic.  EYES: Pupils are equal round regular 

reactive to light and accommodation.  Extraocular movements are normal there is 

no conjunctival pallor.  There is no scleral icterus.  EARS: Tympanic membranes 

are intact.  External auditory canals are clear.  NOSE: There is no deviated 

nasal septum.  There is no inflammation of the nasal mucous membrane.  MOUTH: 

There is no ulcers in the mouth.  Mucous membranes of the mouth are moist.  

THROAT: There is no redness of the oropharynx.  There is no exudates.  SKIN: 

There is no skin rashes.  There is no petechia or ecchymosis.  NECK: Is supple. 

There is mild JVD present.  Carotids are equal there is no bruit.  There is no 

lymphadenopathy.  There is no goiter.  There is no accessory muscle respiration 

use.  Trachea is central.  LUNGS: Shows diminished air entry prolonged 

expiration.  There is scattered end expiratory wheezing and rhonchi.  There is 

"Velcro" [E] rales of pulmonary fibrosis bilaterally in the bases.  There is a 

few fine rales of CHF.  HEART: S1-S2 is heard.  There is no S3 gallop.  There is

 no S4 gallop.  There is systolic murmur of tricuspid regurgitation and mild 

mitral regurgitation murmur present.  There is no S3 gallop.  There is no S4 

gallop.  There is no rub.  ABDOMEN: Is obese.  Nontender.  There is no 

paraspinal megaly.  Bowel sounds are well heard.  EXTREMITIES: Femorals are 

deep.  Femorals are diminished there is no femoral bruits.  Leg pulses are 

diminished.  There is 1+ bilateral edema.  There is no DVT or cellulitis.  There

 is no cyanosis or clubbing.  There is no calf tenderness.  CNS: The patient is 

conscious awake alert oriented x3 with no focal deficit.  PSYCHIATRIC:.  The 

patient judgment insight are intact her affect is normal.


                              Labs- All tests 24 hr











  12/27/19 12/27/19 12/27/19





  04:51 04:51 04:51


 


WBC    4.4


 


RBC    3.26 L


 


Hgb    10.5 L


 


Hct    31.1 L


 


MCV    95


 


MCH    32.1


 


MCHC    33.7


 


RDW    14.8 H


 


Plt Count    179


 


VBG pH  7.39  


 


VBG pCO2  74.1 H*  


 


VBG HCO3  43.8 H  


 


VBG Base Excess  15.5  


 


Sodium   


 


Potassium   


 


Chloride   


 


Carbon Dioxide   


 


Anion Gap   


 


BUN   


 


Creatinine   


 


Est GFR ( Amer)   


 


Est GFR (MDRD) Non-Af   


 


Glucose   


 


POC Glucose   


 


Hemoglobin A1c %   5.9 


 


Calcium   














  12/27/19 12/27/19 12/27/19





  04:51 07:50 11:21


 


WBC   


 


RBC   


 


Hgb   


 


Hct   


 


MCV   


 


MCH   


 


MCHC   


 


RDW   


 


Plt Count   


 


VBG pH   


 


VBG pCO2   


 


VBG HCO3   


 


VBG Base Excess   


 


Sodium  140.6  


 


Potassium  3.8  


 


Chloride  93 L  


 


Carbon Dioxide  42 H*  


 


Anion Gap  6  


 


BUN  19  


 


Creatinine  0.82  


 


Est GFR ( Amer)  > 60  


 


Est GFR (MDRD) Non-Af  > 60  


 


Glucose  92  


 


POC Glucose   110  122 H


 


Hemoglobin A1c %   


 


Calcium  8.9  














  12/27/19 12/27/19





  16:43 21:36


 


WBC  


 


RBC  


 


Hgb  


 


Hct  


 


MCV  


 


MCH  


 


MCHC  


 


RDW  


 


Plt Count  


 


VBG pH  


 


VBG pCO2  


 


VBG HCO3  


 


VBG Base Excess  


 


Sodium  


 


Potassium  


 


Chloride  


 


Carbon Dioxide  


 


Anion Gap  


 


BUN  


 


Creatinine  


 


Est GFR ( Amer)  


 


Est GFR (MDRD) Non-Af  


 


Glucose  


 


POC Glucose  105  137 H


 


Hemoglobin A1c %  


 


Calcium  








                                        





Chest X-Ray  12/25/19 19:52


IMPRESSION:


 


Equivocal right basilar airspace opacity. There is elevation


right hemidiaphragm. Cardiac megaly


 


 





 


Impression/RECOMMENDATION:





1.  Acute on chronic respiratory failure.  Continue supplemental oxygen, and cu

rrent anti-COPD medication


2.  Acute infective bronchitis: Continue current treatment including 

antibiotics.


3.  Most likely acute on chronic systolic heart failure: Continue diuretics, in 

view of the patient's current current wheezing will not start the patient on 

Toprol-XL at present.  We will start the patient on Entresto.  If tachycardia 

persists will give 1 dose of digoxin.  The patient still has some wheezing and 

hence we will start not start Toprol-XL will try from tomorrow.


4 .  Pulmonary fibrosis.


5.  History of asthma and COPD.


6.  Hypertension: Continue current medication.  Increase Entresto as tolerated. 

 Later would add a beta-blocker.


7.  Sinus tach versus atrial flutter.  There is also right bundle branch block 

pattern and left anterior fascicular block.  [Bifascicular block].


8.  Diabetes mellitus type 2 non-insulin-dependent.


9.  Hypothyroidism:: Continue thyroid replacement.  Note the patient's TSH is 

high.  Recommend checking the patient's free T3 and free T4..  


10 Moderate to severe tricuspid regurgitation.  At least moderate pulmonary 

hypertension with right ventricle systolic pressure of 53 to 58 mmHg.


11.  History of pulmonary embolism: Patient states she is on Xarelto.  Will 

continue Xarelto.  


12.  History of breast cancer status post bilateral mastectomies and 

chemotherapy and radiation treatment.


13.  History of multiple abdominal surgeries.


14.  Morbid obesity.


15.  Sinus Tachycardia: We will give 1 dose of digoxin to see if this will help 

the heart rate come down.  Also this will clearly tell us whether this is indeed

sinus tachycardia or atrial flutter.





Medications reviewed.  Medical regimen and management plan discussed with 

attending physician on the case.  Medical decision making is of high complexity.

 40 minutes spent as patient more than 50% of time spent in direct patient care.

## 2019-12-27 NOTE — PDOC PROGRESS REPORT
Subjective


Progress Note for:: 12/27/19


Subjective:: 





12/26/2019-no complaints this a.m.





12/27/2019-no complaints patient feeling much better this a.m.


Reason For Visit: 


ACUTE ON CHRONIC DISTOLIC CONGESTIVE HEART FAILURE








Physical Exam


Vital Signs: 


                                        











Temp Pulse Resp BP Pulse Ox


 


 97.7 F   125 H  16   130/95 H  95 


 


 12/27/19 07:54  12/27/19 08:04  12/27/19 08:04  12/27/19 07:54  12/27/19 08:04








                                 Intake & Output











 12/26/19 12/27/19 12/28/19





 06:59 06:59 06:59


 


Intake Total 50 1040 


 


Output Total 175 3000 


 


Balance -125 -1960 


 


Weight 108.6 kg 108.3 kg 











General appearance: PRESENT: no acute distress, well-developed, well-nourished


Neck exam: ABSENT: carotid bruit, JVD, lymphadenopathy, thyromegaly


Respiratory exam: PRESENT: decreased breath sounds, symmetrical, unlabored.  

ABSENT: rales, rhonchi, wheezes


Cardiovascular exam: PRESENT: RRR.  ABSENT: diastolic murmur, rubs, systolic 

murmur


Pulses: PRESENT: +1 pedal pulses bilateral


Vascular exam: PRESENT: normal capillary refill


GI/Abdominal exam: PRESENT: normal bowel sounds, soft.  ABSENT: distended, 

guarding, mass, organolmegaly, rebound, tenderness


Extremities exam: PRESENT: full ROM, +2 edema.  ABSENT: calf tenderness, 

clubbing, pedal edema


Neurological exam: PRESENT: alert, awake, oriented to person, oriented to place,

oriented to time, oriented to situation, CN II-XII grossly intact.  ABSENT: 

motor sensory deficit


Psychiatric exam: PRESENT: appropriate affect, normal mood.  ABSENT: homicidal 

ideation, suicidal ideation


Skin exam: PRESENT: dry, intact, warm.  ABSENT: cyanosis, rash





Results


Laboratory Results: 


                                        





                                 12/27/19 04:51 





                                 12/27/19 04:51 





                                        











  12/27/19 12/27/19 12/27/19





  04:51 04:51 04:51


 


WBC   4.4 


 


RBC   3.26 L 


 


Hgb   10.5 L 


 


Hct   31.1 L 


 


MCV   95 


 


MCH   32.1 


 


MCHC   33.7 


 


RDW   14.8 H 


 


Plt Count   179 


 


VBG pH  7.39  


 


VBG pCO2  74.1 H*  


 


VBG HCO3  43.8 H  


 


VBG Base Excess  15.5  


 


Sodium    140.6


 


Potassium    3.8


 


Chloride    93 L


 


Carbon Dioxide    42 H*


 


Anion Gap    6


 


BUN    19


 


Creatinine    0.82


 


Est GFR ( Amer)    > 60


 


Glucose    92


 


Calcium    8.9








                                        











  12/25/19 12/25/19 12/25/19





  20:45 20:45 23:14


 


Creatine Kinase  46  


 


Troponin I   0.025  0.027


 


NT-Pro-B Natriuret Pep   2580 H 














  12/26/19 12/26/19





  05:05 11:08


 


Creatine Kinase  


 


Troponin I  0.027  0.024


 


NT-Pro-B Natriuret Pep  











Impressions: 


                                        





Chest X-Ray  12/25/19 19:52


IMPRESSION:


 


Equivocal right basilar airspace opacity. There is elevation


right hemidiaphragm. Cardiac megaly


 


 


copyright 2011 [employer]- All Rights Reserved


 














Assessment and Plan





- Diagnosis


(1) Acute and chronic respiratory failure with hypoxia


Is this a current diagnosis for this admission?: Yes   


Plan: 


12/26/2019-this is actually acute on chronic respiratory failure with hypoxia 

and hypercapnia.  CO2 66.  Will have patient wear BiPAP per RT settings.  

Continue all other supportive measures including supplemental oxygen.  Diuresis.





12/27/2019-much improved.  Patient on nasal cannula sitting up in bed eating 

breakfast.  Patient talking in full sentences.  Continue supplemental oxygen and

continue to diurese.  Patient was started on Entresto yesterday for systolic 

congestive heart failure








(2) Acute on chronic diastolic congestive heart failure


Is this a current diagnosis for this admission?: Yes   


Plan: 


12/26/2019-continue Bumex 1 mg IV every 6 hours and as needed morphine.  BiPAP.





12/27/2019-echocardiogram shows ejection fraction 30-35%.  Patient started on 

Entresto yesterday.  Will repeat echocardiogram per Dr. Abdul.  Metoprolol 

succ 25 mg p.o. twice daily.








(3) Chronic obstructive pulmonary disease (COPD)


Qualifiers: 


   COPD type: unspecified COPD   Qualified Code(s): J44.9 - Chronic obstructive 

pulmonary disease, unspecified   


Is this a current diagnosis for this admission?: Yes   


Plan: 


12/26/2019-supportive measures, BiPAP, pulmonary toileting, bronchodilators 

steroids Levaquin 750 mg daily.





12/27/2019-improved.  Continue current therapy including bronchodilators, 

steroids and Levaquin








(4) Diabetes mellitus type 2 in obese


Is this a current diagnosis for this admission?: Yes   


Plan: 


12/26/2019-stable at this time continue current therapies





12/27/2019-stable








(5) Morbid obesity with BMI of 40.0-44.9, adult


Is this a current diagnosis for this admission?: Yes   


Plan: 


12/26/2019-continue education about dietary modification





12/27/2019-continue dietary modification education








(6) Normochromic normocytic anemia


Is this a current diagnosis for this admission?: Yes   


Plan: 


12/26/2019-stable





12/27/2019-stable








- Plan Summary


Summary: 


Patient is admitted to a telemetry bed where she will receive usual supportive 

and symptomatic cares.  She will be treated with IV Bumex 1 mg every 6 hours to 

help resolve her acute congestive heart failure.  She will also receive 

supplemental oxygen utilizing nasal cannula and or noninvasive airway pressure 

support devices such as CPAP or BiPAP to obtain an adequate oxygen saturation 

and avoid hypercapnia.  She will receive morphine sulfate 2 mg IV every 1 hour 

PRN severe dyspnea.  She will receive a pulmonary toilet utilizing Atrovent and 

Pulmicort and will be maintained on antibiotic therapy for possible pneumo

arron/pneumonitis with Levaquin 750 mg p.o. daily x4 days.  Patient will be 

continued on her usual home medications as soon as her medication reconciliation

has been verified and completed.  A nutritionist consultation will be obtained 

to  the patient on her obesity and possible diet interventions.  Patient 

will be continued on her cardiac and diabetic restricted diet.  Before meals and

at bedtime Accu-Cheks will be obtained with sliding scale insulin used for 

control of hyperglycemia and a hypoglycemic protocol in place.





- Time


Time Spent with patient: 15-24 minutes





- Inpatient Certification


Based on my medical assessment, after consideration of the patient's 

comorbidities, presenting symptoms, or acuity I expect that the services needed 

warrant INPATIENT care.: Yes


I certify that my determination is in accordance with my understanding of 

Medicare's requirements for reasonable and necessary INPATIENT services [42 CFR 

412.3e].: Yes


Medical Necessity: Significant Comorbidiites Make Outpatient Treatment Too 

Risky, Need Close Monitoring Due to Risk of Patient Decompensation, Need For 

Continuous Telemetry Monitoring

## 2019-12-28 LAB
ANION GAP SERPL CALC-SCNC: 7 MMOL/L (ref 5–19)
BUN SERPL-MCNC: 13 MG/DL (ref 7–20)
CALCIUM: 9 MG/DL (ref 8.4–10.2)
CHLORIDE SERPL-SCNC: 95 MMOL/L (ref 98–107)
CO2 SERPL-SCNC: 38 MMOL/L (ref 22–30)
ERYTHROCYTE [DISTWIDTH] IN BLOOD BY AUTOMATED COUNT: 14.7 % (ref 11.5–14)
FREE T4 (FREE THYROXINE): 1.03 NG/DL (ref 0.78–2.19)
GLUCOSE SERPL-MCNC: 101 MG/DL (ref 75–110)
HCT VFR BLD CALC: 32.4 % (ref 36–47)
HGB BLD-MCNC: 10.9 G/DL (ref 12–15.5)
MCH RBC QN AUTO: 32.2 PG (ref 27–33.4)
MCHC RBC AUTO-ENTMCNC: 33.8 G/DL (ref 32–36)
MCV RBC AUTO: 95 FL (ref 80–97)
PLATELET # BLD: 175 10^3/UL (ref 150–450)
POTASSIUM SERPL-SCNC: 3.9 MMOL/L (ref 3.6–5)
RBC # BLD AUTO: 3.4 10^6/UL (ref 3.72–5.28)
T3FREE SERPL-MCNC: 1.49 PG/ML (ref 2.77–5.27)
TSH SERPL-ACNC: 2.27 UIU/ML (ref 0.47–4.68)
WBC # BLD AUTO: 4 10^3/UL (ref 4–10.5)

## 2019-12-28 RX ADMIN — BUDESONIDE SCH MG: 0.5 SUSPENSION RESPIRATORY (INHALATION) at 07:49

## 2019-12-28 RX ADMIN — DOCUSATE SODIUM SCH: 100 CAPSULE, LIQUID FILLED ORAL at 17:33

## 2019-12-28 RX ADMIN — IPRATROPIUM BROMIDE SCH: 0.5 SOLUTION RESPIRATORY (INHALATION) at 23:54

## 2019-12-28 RX ADMIN — INSULIN HUMAN SCH: 100 INJECTION, SOLUTION PARENTERAL at 22:58

## 2019-12-28 RX ADMIN — HYDROCODONE BITARTRATE AND ACETAMINOPHEN PRN TAB: 5; 325 TABLET ORAL at 03:52

## 2019-12-28 RX ADMIN — Medication SCH ML: at 13:12

## 2019-12-28 RX ADMIN — IPRATROPIUM BROMIDE SCH MG: 0.5 SOLUTION RESPIRATORY (INHALATION) at 00:38

## 2019-12-28 RX ADMIN — FAMOTIDINE SCH MG: 20 TABLET, FILM COATED ORAL at 09:09

## 2019-12-28 RX ADMIN — HYDROCODONE BITARTRATE AND ACETAMINOPHEN PRN TAB: 5; 325 TABLET ORAL at 12:50

## 2019-12-28 RX ADMIN — INSULIN HUMAN SCH: 100 INJECTION, SOLUTION PARENTERAL at 08:16

## 2019-12-28 RX ADMIN — IPRATROPIUM BROMIDE SCH MG: 0.5 SOLUTION RESPIRATORY (INHALATION) at 16:12

## 2019-12-28 RX ADMIN — SACUBITRIL AND VALSARTAN SCH TAB: 24; 26 TABLET, FILM COATED ORAL at 17:31

## 2019-12-28 RX ADMIN — CYCLOBENZAPRINE HYDROCHLORIDE PRN MG: 10 TABLET, FILM COATED ORAL at 21:21

## 2019-12-28 RX ADMIN — PREGABALIN SCH MG: 100 CAPSULE ORAL at 21:21

## 2019-12-28 RX ADMIN — METOPROLOL SUCCINATE SCH MG: 50 TABLET, EXTENDED RELEASE ORAL at 21:21

## 2019-12-28 RX ADMIN — LEVOTHYROXINE SODIUM SCH MG: 25 TABLET ORAL at 05:27

## 2019-12-28 RX ADMIN — RIVAROXABAN SCH MG: 10 TABLET, FILM COATED ORAL at 17:31

## 2019-12-28 RX ADMIN — IPRATROPIUM BROMIDE SCH MG: 0.5 SOLUTION RESPIRATORY (INHALATION) at 07:49

## 2019-12-28 RX ADMIN — Medication SCH ML: at 05:28

## 2019-12-28 RX ADMIN — FAMOTIDINE SCH MG: 20 TABLET, FILM COATED ORAL at 21:21

## 2019-12-28 RX ADMIN — PREGABALIN SCH MG: 100 CAPSULE ORAL at 13:12

## 2019-12-28 RX ADMIN — SACUBITRIL AND VALSARTAN SCH TAB: 24; 26 TABLET, FILM COATED ORAL at 09:09

## 2019-12-28 RX ADMIN — LEVOFLOXACIN SCH MG: 750 TABLET, FILM COATED ORAL at 09:09

## 2019-12-28 RX ADMIN — PREGABALIN SCH MG: 100 CAPSULE ORAL at 05:27

## 2019-12-28 RX ADMIN — BUDESONIDE SCH MG: 0.5 SUSPENSION RESPIRATORY (INHALATION) at 20:33

## 2019-12-28 RX ADMIN — DOCUSATE SODIUM SCH: 100 CAPSULE, LIQUID FILLED ORAL at 09:10

## 2019-12-28 RX ADMIN — Medication SCH ML: at 21:25

## 2019-12-28 RX ADMIN — INSULIN HUMAN SCH: 100 INJECTION, SOLUTION PARENTERAL at 16:31

## 2019-12-28 RX ADMIN — INSULIN HUMAN SCH: 100 INJECTION, SOLUTION PARENTERAL at 12:18

## 2019-12-28 NOTE — PDOC PROGRESS REPORT
Subjective


Progress Note for:: 12/28/19


Subjective:: 





12/26/2019-no complaints this a.m.





12/27/2019-no complaints patient feeling much better this a.m.





12/28/2019-no complaints


Reason For Visit: 


ACUTE ON CHRONIC DISTOLIC CONGESTIVE HEART FAILURE








Physical Exam


Vital Signs: 


                                        











Temp Pulse Resp BP Pulse Ox


 


 97.2 F   125 H  16   110/77   97 


 


 12/28/19 06:58  12/28/19 07:49  12/28/19 07:49  12/28/19 06:58  12/28/19 07:49








                                 Intake & Output











 12/27/19 12/28/19 12/29/19





 06:59 06:59 06:59


 


Intake Total 1040 980 


 


Output Total 3000 4000 


 


Balance -1960 -3020 


 


Weight 108.3 kg 103.9 kg 











General appearance: PRESENT: no acute distress, well-developed, well-nourished


Neck exam: ABSENT: carotid bruit, JVD, lymphadenopathy, thyromegaly


Respiratory exam: PRESENT: decreased breath sounds, symmetrical, unlabored


Cardiovascular exam: PRESENT: RRR.  ABSENT: diastolic murmur, rubs, systolic 

murmur


Pulses: PRESENT: +1 pedal pulses bilateral


Vascular exam: PRESENT: normal capillary refill


GI/Abdominal exam: PRESENT: normal bowel sounds, soft.  ABSENT: distended, 

guarding, mass, organolmegaly, rebound, tenderness


Extremities exam: PRESENT: full ROM, pedal edema, +1 edema.  ABSENT: calf 

tenderness, clubbing


Neurological exam: PRESENT: alert, awake, oriented to person, oriented to place,

oriented to time, oriented to situation, CN II-XII grossly intact.  ABSENT: 

motor sensory deficit


Psychiatric exam: PRESENT: appropriate affect, normal mood.  ABSENT: homicidal 

ideation, suicidal ideation


Skin exam: PRESENT: dry, intact, warm.  ABSENT: cyanosis, rash





Results


Laboratory Results: 


                                        





                                 12/28/19 04:38 





                                 12/28/19 04:38 





                                        











  12/28/19 12/28/19 12/28/19





  04:38 04:38 04:38


 


WBC  4.0  


 


RBC  3.40 L  


 


Hgb  10.9 L  


 


Hct  32.4 L  


 


MCV  95  


 


MCH  32.2  


 


MCHC  33.8  


 


RDW  14.7 H  


 


Plt Count  175  


 


Sodium   140.2 


 


Potassium   3.9 


 


Chloride   95 L 


 


Carbon Dioxide   38 H 


 


Anion Gap   7 


 


BUN   13 


 


Creatinine   0.72 


 


Est GFR ( Amer)   > 60 


 


Glucose   101 


 


Calcium   9.0 


 


TSH    2.27


 


Free T4    1.03


 


Free T3 pg/mL    1.49 L








                                        











  12/25/19 12/25/19 12/25/19





  20:45 20:45 23:14


 


Creatine Kinase  46  


 


Troponin I   0.025  0.027


 


NT-Pro-B Natriuret Pep   2580 H 














  12/26/19 12/26/19





  05:05 11:08


 


Creatine Kinase  


 


Troponin I  0.027  0.024


 


NT-Pro-B Natriuret Pep  











Impressions: 


                                        





Chest X-Ray  12/25/19 19:52


IMPRESSION:


 


Equivocal right basilar airspace opacity. There is elevation


right hemidiaphragm. Cardiac megaly


 


 


copyright 2011 [employer]- All Rights Reserved


 














Assessment and Plan





- Diagnosis


(1) Acute and chronic respiratory failure with hypoxia


Is this a current diagnosis for this admission?: Yes   


Plan: 


12/26/2019-this is actually acute on chronic respiratory failure with hypoxia 

and hypercapnia.  CO2 66.  Will have patient wear BiPAP per RT settings.  C

ontinue all other supportive measures including supplemental oxygen.  Diuresis.





12/27/2019-much improved.  Patient on nasal cannula sitting up in bed eating 

breakfast.  Patient talking in full sentences.  Continue supplemental oxygen and

continue to diurese.  Patient was started on Entresto yesterday for systolic 

congestive heart failure





12/20/2019-improved.  Patient refusing BiPAP.  Most of the secondary to chronic 

systolic congestive heart failure.  Patient followed by Dr. Abdul








(2) Acute on chronic diastolic congestive heart failure


Is this a current diagnosis for this admission?: Yes   


Plan: 


12/26/2019-continue Bumex 1 mg IV every 6 hours and as needed morphine.  BiPAP.





12/27/2019-echocardiogram shows ejection fraction 30-35%.  Patient started on 

Entresto yesterday.  Will repeat echocardiogram per Dr. Abdul.  Metoprolol 

succ 25 mg p.o. twice daily.





12/28/2019-Per cardiology recommendations








(3) Chronic obstructive pulmonary disease (COPD)


Qualifiers: 


   COPD type: unspecified COPD   Qualified Code(s): J44.9 - Chronic obstructive 

pulmonary disease, unspecified   


Is this a current diagnosis for this admission?: Yes   


Plan: 


12/26/2019-supportive measures, BiPAP, pulmonary toileting, bronchodilators 

steroids Levaquin 750 mg daily.





12/27/2019-improved.  Continue current therapy including bronchodilators, 

steroids and Levaquin





12/28/2019-stable see #1








(4) Diabetes mellitus type 2 in obese


Is this a current diagnosis for this admission?: Yes   


Plan: 


12/26/2019-stable at this time continue current therapies





12/27/2019-stable





12/28/2019-stable








(5) Morbid obesity with BMI of 40.0-44.9, adult


Is this a current diagnosis for this admission?: Yes   


Plan: 


12/26/2019-continue education about dietary modification





12/27/2019-continue dietary modification education





12/28/2019-dietary modification education








(6) Normochromic normocytic anemia


Is this a current diagnosis for this admission?: Yes   


Plan: 


12/26/2019-stable





12/27/2019-stable





12/28/2019-stable








- Plan Summary


Summary: 


Patient is admitted to a telemetry bed where she will receive usual supportive 

and symptomatic cares.  She will be treated with IV Bumex 1 mg every 6 hours to 

help resolve her acute congestive heart failure.  She will also receive 

supplemental oxygen utilizing nasal cannula and or noninvasive airway pressure 

support devices such as CPAP or BiPAP to obtain an adequate oxygen saturation 

and avoid hypercapnia.  She will receive morphine sulfate 2 mg IV every 1 hour 

PRN severe dyspnea.  She will receive a pulmonary toilet utilizing Atrovent and 

Pulmicort and will be maintained on antibiotic therapy for possible 

pneumonia/pneumonitis with Levaquin 750 mg p.o. daily x4 days.  Patient will be 

continued on her usual home medications as soon as her medication reconciliation

has been verified and completed.  A nutritionist consultation will be obtained 

to  the patient on her obesity and possible diet interventions.  Patient 

will be continued on her cardiac and diabetic restricted diet.  Before meals and

at bedtime Accu-Cheks will be obtained with sliding scale insulin used for 

control of hyperglycemia and a hypoglycemic protocol in place.





- Time


Time Spent with patient: 15-24 minutes





- Inpatient Certification


Based on my medical assessment, after consideration of the patient's 

comorbidities, presenting symptoms, or acuity I expect that the services needed 

warrant INPATIENT care.: Yes


I certify that my determination is in accordance with my understanding of 

Medicare's requirements for reasonable and necessary INPATIENT services [42 CFR 

412.3e].: Yes


Medical Necessity: Significant Comorbidiites Make Outpatient Treatment Too 

Risky, Need Close Monitoring Due to Risk of Patient Decompensation, Need For C

ontinuous Telemetry Monitoring

## 2019-12-28 NOTE — XCELERA REPORT
[address] [address]

                               Tel: [phone]

                               Fax: [phone]



                      Transthoracic Echocardiogram Report

_______________________________________________________________________________



Name: SHAYAN BUSH

MRN: [id number]                           Age: 79 yrs

Gender: Female                            : 1940

Patient Status: Inpatient                 Patient Location: [address] [place], [place] [address]

Account #: [id number]

Study Date: 2019 02:38 PM

Accession #: [id number]

_______________________________________________________________________________



Height: 60 in        Weight: 238 lb        BSA: 2.0 m2

_______________________________________________________________________________

Procedure: A two-dimensional transthoracic echocardiogram with color flow and

Doppler was performed in limited views only. Study Quality: Fair.

Reason For Study: EVAL RT ATRIAL PRESSURE



History: PULMONARY HYPERTENSION.

Ordering Physician: WEILAND, PAUL



Performed By: Nik Jasso

_______________________________________________________________________________



Interpretation Summary

The right ventricular systolic function is moderately reduced.

The right ventricle is moderately dilated.

There is no tricuspid stenosis.

There is a moderate amount of tricuspid regurgitation

rvsp IS 53 TO 58 MM OF hG , WITH ra MEAN OF 10 -15.(IVC not seen).

There is moderate pulmonary hypertension by echo



MMode/2D Measurements & Calculations

RVDd: 4.3 cm  LVIDd: 5.3 cm   FS: 22.2 %             Ao root diam: 3.5 cm



IVSd: 1.2 cm  LVIDs: 4.1 cm   EDV(Teich): 135.8 ml   Ao root area: 9.4 cm2

              LVPWd: 1.2 cm   ESV(Teich): 75.6 ml    LA dimension: 4.9 cm

                              EF(Teich): 44.4 %



Doppler Measurements & Calculations

TR max iraida: 327.4 cm/sec

TR max P.9 mmHg



Right Ventricle

The right ventricle is moderately dilated. The right ventricular systolic

function is moderately reduced.



Tricuspid Valve

There is no tricuspid stenosis. There is a moderate amount of tricuspid

regurgitation. rvsp IS 53 TO 58 MM OF hG , WITH ra MEAN OF 10 -15.(IVC not

seen). There is moderate pulmonary hypertension by echo.





_______________________________________________________________________________

_______________________________________________________________________________



Electronically signed by:      Nuvia Abdul      on 2019 01:42 PM



CC: WEILAND, PAUL Narasimhan, Nuvia

## 2019-12-28 NOTE — PROGRESS NOTE
Provider Note


Provider Note: 





CARDIOLOGY PROGRESS NOTE by Dr. Nuvia Abdul on 12/28/2019.





Subjective: The patient states that shortness of breath is much better.  She 

still has some degree of orthopnea.  She still requires oxygen.  She is still a 

heart rate is 129 to 130 bpm and now the suspicion that this is atrial flutter 

is high.  As the patient was given 5 mg of IV push of metoprolol followed by 

another 5 mg of IV push of Cardizem.  The patient already received a dose of 

Toprol-XL 25 mg p.o.  Subsequently the patient later had an EKG which shows that

this is indeed atrial flutter.  There is no ventricular arrhythmia seen on the 

monitor.  There is no TIA CVA symptoms.  There is no bleeding on Eliquis.  The 

patient states his cough is much improved.  The patient not wheezing today.  As 

mentioned earlier repeat interrogation of the tricuspid valve shows there is 

moderate tricuspid regurgitation.  Since the IVC was not visualized, and since 

there is right ventricular and right atrial enlargement and decreased right 

ventricle systolic function the patient's probably has moderate pulmonary 

hypertension at least.  Now this raises the possibility of tachycardia induced 

cardiomyopathy.  Since the patient most likely was in atrial flutter with fast 

ventricular response.  The degree of time.





PHYSICAL EXAMINATION: The patient is morbidly obese.  At present in no acute 

distress.


                                                                Selected Entries











  12/28/19 12/28/19 12/28/19





  06:58 16:08 16:12


 


Temperature 97.2 F 98.1 F 


 


Temperature Oral Oral 





Source   


 


Pulse Rate 124 H 77 119 H


 


Heart Rate (   





Monitors)   


 


Respiratory 18 18 





Rate   


 


Blood Pressure 110/77 116/62 


 


Blood Pressure  80 





Mean   


 


BP Location Left Arm Left Arm 


 


O2 Sat by Pulse 100 99 





Oximetry   


 


Oxygen Flow 3.00 2.50 





Rate   


 


Oxygen Delivery Nasal Cannula Nasal Cannula 





Method   














  12/28/19





  17:00


 


Temperature 


 


Temperature 





Source 


 


Pulse Rate 


 


Heart Rate ( 86





Monitors) 


 


Respiratory 





Rate 


 


Blood Pressure 


 


Blood Pressure 





Mean 


 


BP Location 


 


O2 Sat by Pulse 





Oximetry 


 


Oxygen Flow 





Rate 


 


Oxygen Delivery 





Method 





HEAD: Is atraumatic.  Normocephalic.  EYES: Pupils are equal round regular 

reactive to light and accommodation.  Extraocular movements are normal there is 

no conjunctival pallor.  There is no scleral icterus.  EARS: Tympanic membranes 

are intact.  External auditory canals are clear.  NOSE: There is no deviated 

nasal septum.  There is no inflammation of the nasal mucous membrane.  MOUTH: 

There is no ulcers in the mouth.  Mucous membranes of the mouth are moist.  

THROAT: There is no redness of the oropharynx.  There is no exudates.  SKIN: 

There is no skin rashes.  There is no petechia or ecchymosis.  NECK: Is supple. 

There is mild JVD present.  Carotids are equal there is no bruit.  There is no 

lymphadenopathy.  There is no goiter.  There is no accessory muscle respiration 

use.  Trachea is central.  LUNGS: Shows diminished air entry prolonged 

expiration.  There is scattered end expiratory wheezing and rhonchi.  There is 

"Velcro" [E] rales of pulmonary fibrosis bilaterally in the bases.  There is a 

few fine rales of CHF.  HEART: S1-S2 is heard.  There is no S3 gallop.  There is

 no S4 gallop.  There is systolic murmur of tricuspid regurgitation and mild 

mitral regurgitation murmur present.  There is no S3 gallop.  There is no S4 

gallop.  There is no rub.  ABDOMEN: Is obese.  Nontender.  There is no 

paraspinal megaly.  Bowel sounds are well heard.  EXTREMITIES: Femorals are 

deep.  Femorals are diminished there is no femoral bruits.  Leg pulses are 

diminished.  There is 1+ bilateral edema.  There is no DVT or cellulitis.  There

 is no cyanosis or clubbing.  There is no calf tenderness.  CNS: The patient is 

conscious awake alert oriented x3 with no focal deficit.  PSYCHIATRIC:.  The 

patient judgment insight are intact her affect is normal.


                                        





Chest X-Ray  12/25/19 19:52


IMPRESSION:


 


Equivocal right basilar airspace opacity. There is elevation


right hemidiaphragm. Cardiac megaly


 


 


copyright 2011 [employer]- All Rights Reserved


 








                                        











  12/25/19 12/25/19 12/25/19





  20:45 20:45 20:45


 


RBC  3.29 L  


 


Hgb  10.6 L  


 


Hct  31.6 L  


 


RDW  14.8 H  


 


VBG pCO2   


 


VBG HCO3   


 


Chloride   97 L 


 


Carbon Dioxide   37 H 


 


BUN   28 H 


 


POC Glucose   


 


Lactic Acid   


 


NT-Pro-B Natriuret Pep    2580 H


 


TSH   


 


Free T3 pg/mL   














  12/25/19 12/26/19 12/26/19





  21:58 05:05 05:05


 


RBC   3.19 L 


 


Hgb   10.3 L 


 


Hct   30.4 L 


 


RDW   14.5 H 


 


VBG pCO2   


 


VBG HCO3   


 


Chloride   


 


Carbon Dioxide    38 H


 


BUN    26 H


 


POC Glucose   


 


Lactic Acid  0.5 L  


 


NT-Pro-B Natriuret Pep   


 


TSH   


 


Free T3 pg/mL   














  12/26/19 12/26/19 12/26/19





  05:05 05:05 08:33


 


RBC   


 


Hgb   


 


Hct   


 


RDW   


 


VBG pCO2   66.1 H* 


 


VBG HCO3   38.5 H 


 


Chloride   


 


Carbon Dioxide   


 


BUN   


 


POC Glucose    113 H


 


Lactic Acid   


 


NT-Pro-B Natriuret Pep   


 


TSH  7.47 H  


 


Free T3 pg/mL   














  12/26/19 12/26/19 12/27/19





  11:16 21:09 04:51


 


RBC   


 


Hgb   


 


Hct   


 


RDW   


 


VBG pCO2    74.1 H*


 


VBG HCO3    43.8 H


 


Chloride   


 


Carbon Dioxide   


 


BUN   


 


POC Glucose  189 H  154 H 


 


Lactic Acid   


 


NT-Pro-B Natriuret Pep   


 


TSH   


 


Free T3 pg/mL   














  12/27/19 12/27/19 12/27/19





  04:51 04:51 11:21


 


RBC  3.26 L  


 


Hgb  10.5 L  


 


Hct  31.1 L  


 


RDW  14.8 H  


 


VBG pCO2   


 


VBG HCO3   


 


Chloride   93 L 


 


Carbon Dioxide   42 H* 


 


BUN   


 


POC Glucose    122 H


 


Lactic Acid   


 


NT-Pro-B Natriuret Pep   


 


TSH   


 


Free T3 pg/mL   














  12/27/19 12/28/19 12/28/19





  21:36 04:38 04:38


 


RBC   3.40 L 


 


Hgb   10.9 L 


 


Hct   32.4 L 


 


RDW   14.7 H 


 


VBG pCO2   


 


VBG HCO3   


 


Chloride    95 L


 


Carbon Dioxide    38 H


 


BUN   


 


POC Glucose  137 H  


 


Lactic Acid   


 


NT-Pro-B Natriuret Pep   


 


TSH   


 


Free T3 pg/mL   














  12/28/19 12/28/19 12/28/19





  04:38 07:54 11:39


 


RBC   


 


Hgb   


 


Hct   


 


RDW   


 


VBG pCO2   


 


VBG HCO3   


 


Chloride   


 


Carbon Dioxide   


 


BUN   


 


POC Glucose   139 H  128 H


 


Lactic Acid   


 


NT-Pro-B Natriuret Pep   


 


TSH   


 


Free T3 pg/mL  1.49 L  














  12/28/19 12/28/19





  16:04 21:31


 


RBC  


 


Hgb  


 


Hct  


 


RDW  


 


VBG pCO2  


 


VBG HCO3  


 


Chloride  


 


Carbon Dioxide  


 


BUN  


 


POC Glucose  125 H  143 H


 


Lactic Acid  


 


NT-Pro-B Natriuret Pep  


 


TSH  


 


Free T3 pg/mL  








                              Labs- All tests 24 hr











  12/28/19 12/28/19 12/28/19





  04:38 04:38 04:38


 


WBC  4.0  


 


RBC  3.40 L  


 


Hgb  10.9 L  


 


Hct  32.4 L  


 


MCV  95  


 


MCH  32.2  


 


MCHC  33.8  


 


RDW  14.7 H  


 


Plt Count  175  


 


Sodium   140.2 


 


Potassium   3.9 


 


Chloride   95 L 


 


Carbon Dioxide   38 H 


 


Anion Gap   7 


 


BUN   13 


 


Creatinine   0.72 


 


Est GFR ( Amer)   > 60 


 


Est GFR (MDRD) Non-Af   > 60 


 


Glucose   101 


 


POC Glucose   


 


Calcium   9.0 


 


TSH    2.27


 


Free T4    1.03


 


Free T3 pg/mL    1.49 L














  12/28/19 12/28/19 12/28/19





  07:54 11:39 16:04


 


WBC   


 


RBC   


 


Hgb   


 


Hct   


 


MCV   


 


MCH   


 


MCHC   


 


RDW   


 


Plt Count   


 


Sodium   


 


Potassium   


 


Chloride   


 


Carbon Dioxide   


 


Anion Gap   


 


BUN   


 


Creatinine   


 


Est GFR ( Amer)   


 


Est GFR (MDRD) Non-Af   


 


Glucose   


 


POC Glucose  139 H  128 H  125 H


 


Calcium   


 


TSH   


 


Free T4   


 


Free T3 pg/mL   














  12/28/19





  21:31


 


WBC 


 


RBC 


 


Hgb 


 


Hct 


 


MCV 


 


MCH 


 


MCHC 


 


RDW 


 


Plt Count 


 


Sodium 


 


Potassium 


 


Chloride 


 


Carbon Dioxide 


 


Anion Gap 


 


BUN 


 


Creatinine 


 


Est GFR ( Amer) 


 


Est GFR (MDRD) Non-Af 


 


Glucose 


 


POC Glucose  143 H


 


Calcium 


 


TSH 


 


Free T4 


 


Free T3 pg/mL 














Impression/RECOMMENDATION:





1.  Acute on chronic respiratory failure.  Continue supplemental oxygen, and 

current anti-COPD medication


2.  Acute infective bronchitis: Continue current treatment including 

antibiotics.


3.  Most likely acute on chronic systolic heart failure: Continue diuretics, in 

view of the patient's current current wheezing will not start the patient on 

Toprol-XL at present.  We will start the patient on Entresto.  If tachycardia 

persists will give 1 dose of digoxin.  The patient still has some wheezing and 

hence we will start not start Toprol-XL will try from tomorrow.


4 .  Pulmonary fibrosis.?  Right lower lobe pneumonia.  We will repeat the 

patient's chest x-ray in a.m.


5.  History of asthma and COPD.


6.  Hypertension: Continue current medication.  Increase Entresto as tolerated. 

 Later would add a beta-blocker.


7. Atrial flutter.  There is also right bundle branch block pattern and left 

anterior fascicular block.  [Bifascicular block].  This raises the possibility 

of post tachycardia induced cardiomyopathy.  Will increase the patient's Toprol-

XL to 50 mg p.o. every 12 hours.  In view of the cardiomyopathy we will continue

patient on Entresto.


8.  Diabetes mellitus type 2 non-insulin-dependent.


9.  Hypothyroidism:: Continue thyroid replacement.  Note the patient's repeat 

TSH is normal, and free T3 and free T4 are normal...  


10 Moderate to severe tricuspid regurgitation.  At least moderate pulmonary 

hypertension with right ventricle systolic pressure of 53 to 58 mmHg.


11.  History of pulmonary embolism: Patient states she is on Xarelto.  Will 

continue Xarelto.  


12.  History of breast cancer status post bilateral mastectomies and 

chemotherapy and radiation treatment.


13.  History of multiple abdominal surgeries.


14.  Morbid obesity.





Occasions reviewed.  Medications increased [dose of Toprol increased].  

Medication regimen and management plan discussed with the attending provider on 

the case.  Also discussed on the phone with the patient's permission with the 

patient's granddaughter the patient's condition.  Medical decision making is of 

high complexity.  40 minutes spent with patient more than 50% time spent in 

direct patient care.  Will follow.

## 2019-12-28 NOTE — EKG REPORT
SEVERITY:- ABNORMAL ECG -

ATRIAL FLUTTER, A-RATE 258

RBBB AND LAFB

PROBABLE LVH WITH SECONDARY REPOL ABNRM

:

Confirmed by: Nuvia Abdul MD 28-Dec-2019 22:53:42

## 2019-12-29 LAB
ADD MANUAL DIFF: NO
ANION GAP SERPL CALC-SCNC: 9 MMOL/L (ref 5–19)
ARTERIAL BLOOD FIO2: (no result)
ARTERIAL BLOOD FIO2: (no result)
ARTERIAL BLOOD H2CO3: 1.77 MMOL/L (ref 1.05–1.35)
ARTERIAL BLOOD H2CO3: 1.8 MMOL/L (ref 1.05–1.35)
ARTERIAL BLOOD HCO3: 32.6 MMOL/L (ref 20–24)
ARTERIAL BLOOD HCO3: 37.2 MMOL/L (ref 20–24)
ARTERIAL BLOOD PCO2: 58.8 MMHG (ref 35–45)
ARTERIAL BLOOD PCO2: 59.9 MMHG (ref 35–45)
ARTERIAL BLOOD PH: 7.35 (ref 7.35–7.45)
ARTERIAL BLOOD PH: 7.42 (ref 7.35–7.45)
ARTERIAL BLOOD PO2: 75.1 MMHG (ref 80–100)
ARTERIAL BLOOD PO2: 83.7 MMHG (ref 80–100)
ARTERIAL BLOOD TOTAL CO2: 34.5 MMOL/L (ref 21–25)
ARTERIAL BLOOD TOTAL CO2: 39 MMOL/L (ref 21–25)
BASE EXCESS BLDA CALC-SCNC: 10.5 MMOL/L
BASE EXCESS BLDA CALC-SCNC: 5.4 MMOL/L
BASE EXCESS BLDV CALC-SCNC: 10.6 MMOL/L
BASOPHILS # BLD AUTO: 0 10^3/UL (ref 0–0.2)
BASOPHILS NFR BLD AUTO: 0.2 % (ref 0–2)
BUN SERPL-MCNC: 19 MG/DL (ref 7–20)
CALCIUM: 9 MG/DL (ref 8.4–10.2)
CHLORIDE SERPL-SCNC: 95 MMOL/L (ref 98–107)
CO2 SERPL-SCNC: 34 MMOL/L (ref 22–30)
EOSINOPHIL # BLD AUTO: 0 10^3/UL (ref 0–0.6)
EOSINOPHIL NFR BLD AUTO: 0.1 % (ref 0–6)
ERYTHROCYTE [DISTWIDTH] IN BLOOD BY AUTOMATED COUNT: 14.5 % (ref 11.5–14)
GLUCOSE SERPL-MCNC: 274 MG/DL (ref 75–110)
HCO3 BLDV-SCNC: 40.7 MMOL/L (ref 20–32)
HCT VFR BLD CALC: 37 % (ref 36–47)
HGB BLD-MCNC: 12.6 G/DL (ref 12–15.5)
LYMPHOCYTES # BLD AUTO: 0.4 10^3/UL (ref 0.5–4.7)
LYMPHOCYTES NFR BLD AUTO: 5.4 % (ref 13–45)
MCH RBC QN AUTO: 32 PG (ref 27–33.4)
MCHC RBC AUTO-ENTMCNC: 34.1 G/DL (ref 32–36)
MCV RBC AUTO: 94 FL (ref 80–97)
MONOCYTES # BLD AUTO: 0.4 10^3/UL (ref 0.1–1.4)
MONOCYTES NFR BLD AUTO: 4.9 % (ref 3–13)
NEUTROPHILS # BLD AUTO: 7 10^3/UL (ref 1.7–8.2)
NEUTS SEG NFR BLD AUTO: 89.4 % (ref 42–78)
PCO2 BLDV: 85.8 MMHG (ref 35–63)
PH BLDV: 7.29 [PH] (ref 7.3–7.42)
PLATELET # BLD: 183 10^3/UL (ref 150–450)
POTASSIUM SERPL-SCNC: 4.3 MMOL/L (ref 3.6–5)
RBC # BLD AUTO: 3.94 10^6/UL (ref 3.72–5.28)
SAO2 % BLDA: 95 % (ref 94–98)
SAO2 % BLDA: 95.6 % (ref 94–98)
TOTAL CELLS COUNTED % (AUTO): 100 %
WBC # BLD AUTO: 7.8 10^3/UL (ref 4–10.5)

## 2019-12-29 RX ADMIN — PROMETHAZINE HYDROCHLORIDE PRN MG: 25 INJECTION INTRAMUSCULAR; INTRAVENOUS at 06:28

## 2019-12-29 RX ADMIN — Medication SCH ML: at 06:31

## 2019-12-29 RX ADMIN — INSULIN HUMAN SCH: 100 INJECTION, SOLUTION PARENTERAL at 21:32

## 2019-12-29 RX ADMIN — PREGABALIN SCH MG: 100 CAPSULE ORAL at 21:22

## 2019-12-29 RX ADMIN — FAMOTIDINE SCH MG: 20 TABLET, FILM COATED ORAL at 21:22

## 2019-12-29 RX ADMIN — Medication PRN MLS/HR: at 09:11

## 2019-12-29 RX ADMIN — BUDESONIDE SCH MG: 0.5 SUSPENSION RESPIRATORY (INHALATION) at 08:12

## 2019-12-29 RX ADMIN — METOPROLOL SUCCINATE SCH MG: 50 TABLET, EXTENDED RELEASE ORAL at 21:22

## 2019-12-29 RX ADMIN — LEVOTHYROXINE SODIUM SCH: 50 TABLET ORAL at 06:28

## 2019-12-29 RX ADMIN — Medication SCH: at 15:07

## 2019-12-29 RX ADMIN — METOPROLOL SUCCINATE SCH: 50 TABLET, EXTENDED RELEASE ORAL at 10:36

## 2019-12-29 RX ADMIN — PREGABALIN SCH: 100 CAPSULE ORAL at 15:07

## 2019-12-29 RX ADMIN — DOCUSATE SODIUM SCH: 100 CAPSULE, LIQUID FILLED ORAL at 10:36

## 2019-12-29 RX ADMIN — IPRATROPIUM BROMIDE SCH MG: 0.5 SOLUTION RESPIRATORY (INHALATION) at 23:38

## 2019-12-29 RX ADMIN — PROMETHAZINE HYDROCHLORIDE PRN MG: 25 INJECTION INTRAMUSCULAR; INTRAVENOUS at 01:48

## 2019-12-29 RX ADMIN — SACUBITRIL AND VALSARTAN SCH: 24; 26 TABLET, FILM COATED ORAL at 11:51

## 2019-12-29 RX ADMIN — INSULIN HUMAN SCH: 100 INJECTION, SOLUTION PARENTERAL at 10:27

## 2019-12-29 RX ADMIN — LEVOFLOXACIN SCH: 750 TABLET, FILM COATED ORAL at 11:51

## 2019-12-29 RX ADMIN — INSULIN HUMAN SCH UNIT: 100 INJECTION, SOLUTION PARENTERAL at 11:51

## 2019-12-29 RX ADMIN — PREGABALIN SCH: 100 CAPSULE ORAL at 06:28

## 2019-12-29 RX ADMIN — BUDESONIDE SCH MG: 0.5 SUSPENSION RESPIRATORY (INHALATION) at 19:41

## 2019-12-29 RX ADMIN — METHYLPREDNISOLONE SODIUM SUCCINATE SCH MG: 40 INJECTION, POWDER, FOR SOLUTION INTRAMUSCULAR; INTRAVENOUS at 21:24

## 2019-12-29 RX ADMIN — DOCUSATE SODIUM SCH: 100 CAPSULE, LIQUID FILLED ORAL at 18:00

## 2019-12-29 RX ADMIN — Medication SCH ML: at 21:24

## 2019-12-29 RX ADMIN — IPRATROPIUM BROMIDE SCH MG: 0.5 SOLUTION RESPIRATORY (INHALATION) at 16:04

## 2019-12-29 RX ADMIN — SACUBITRIL AND VALSARTAN SCH: 24; 26 TABLET, FILM COATED ORAL at 18:01

## 2019-12-29 RX ADMIN — IPRATROPIUM BROMIDE SCH MG: 0.5 SOLUTION RESPIRATORY (INHALATION) at 08:12

## 2019-12-29 RX ADMIN — RIVAROXABAN SCH MG: 10 TABLET, FILM COATED ORAL at 18:06

## 2019-12-29 RX ADMIN — FAMOTIDINE SCH: 20 TABLET, FILM COATED ORAL at 10:36

## 2019-12-29 RX ADMIN — INSULIN HUMAN SCH UNIT: 100 INJECTION, SOLUTION PARENTERAL at 18:06

## 2019-12-29 NOTE — PDOC PROGRESS REPORT
Subjective


Progress Note for:: 12/29/19


Subjective:: 


12/20/2019.  This morning patient was found to be very lethargic, rapid response

was called.  On chart review it was found that patient had received Phenergan 

and her lethargy was bad.  Chest x-ray did not show any significant changes.  

CBC CMP WNL.  ABG showed mild CO2 retention.  Patient was started on BiPAP with 

improvement of her symptoms.  But unfortunately patient became lethargic again. 

Patient was continued on BiPAP and a CT head was obtained this afternoon which 

showed acute lacunar infarction.  Patient was transferred to ICU for airway 

protection and further management. 


Reason For Visit: 


ACUTE ON CHRONIC DISTOLIC CONGESTIVE HEART FAILURE








Physical Exam


Vital Signs: 


                                        











Temp Pulse Resp BP Pulse Ox


 


 98.1 F   115 H  23 H  122/81   98 


 


 12/29/19 14:48  12/29/19 18:36  12/29/19 16:04  12/29/19 18:00  12/29/19 16:04








                                 Intake & Output











 12/28/19 12/29/19 12/30/19





 06:59 06:59 06:59


 


Intake Total 980 1720 18


 


Output Total 4000 1600 800


 


Balance -3020 120 -782


 


Weight 103.9 kg 103.6 kg 











General appearance: PRESENT: other - Somnolent, arousable.  Following commands.


Respiratory exam: PRESENT: crackles - Diffuse crackles., decreased breath 

sounds.  ABSENT: rales, rhonchi, wheezes


Cardiovascular exam: PRESENT: irregular rhythm, tachycardia.  ABSENT: diastolic 

murmur, rubs, systolic murmur


GI/Abdominal exam: PRESENT: normal bowel sounds, soft.  ABSENT: distended, 

guarding, mass, organolmegaly, rebound, tenderness


Neurological exam: PRESENT: CN II-XII grossly intact, motor sensory deficit, 

other - Somnolent.  Arousable.





Results


Laboratory Results: 


                                        





                                 12/29/19 07:58 





                                 12/29/19 07:58 





                                        











  12/29/19 12/29/19 12/29/19





  07:58 07:58 07:58


 


WBC  7.8  


 


RBC  3.94  


 


Hgb  12.6  


 


Hct  37.0  


 


MCV  94  


 


MCH  32.0  


 


MCHC  34.1  


 


RDW  14.5 H  


 


Plt Count  183  


 


Seg Neutrophils %  89.4 H  


 


Carbonic Acid    1.80 H


 


HCO3/H2CO3 Ratio    18:1


 


ABG pH    7.35


 


ABG pCO2    59.9 H


 


ABG pO2    83.7


 


ABG HCO3    32.6 H


 


ABG O2 Saturation    95.6


 


ABG Base Excess    5.4


 


VBG pH   


 


VBG pCO2   


 


VBG HCO3   


 


VBG Base Excess   


 


FiO2    30%


 


Sodium   137.8 


 


Potassium   4.3 


 


Chloride   95 L 


 


Carbon Dioxide   34 H 


 


Anion Gap   9 


 


BUN   19 


 


Creatinine   0.69 


 


Est GFR ( Amer)   > 60 


 


Glucose   274 H 


 


Calcium   9.0 


 


Magnesium   1.7 














  12/29/19 12/29/19





  14:30 15:10


 


WBC  


 


RBC  


 


Hgb  


 


Hct  


 


MCV  


 


MCH  


 


MCHC  


 


RDW  


 


Plt Count  


 


Seg Neutrophils %  


 


Carbonic Acid   1.77 H


 


HCO3/H2CO3 Ratio   21:1


 


ABG pH   7.42


 


ABG pCO2   58.8 H


 


ABG pO2   75.1 L


 


ABG HCO3   37.2 H


 


ABG O2 Saturation   95.0


 


ABG Base Excess   10.5


 


VBG pH  7.29 L 


 


VBG pCO2  85.8 H* 


 


VBG HCO3  40.7 H 


 


VBG Base Excess  10.6 


 


FiO2   28%


 


Sodium  


 


Potassium  


 


Chloride  


 


Carbon Dioxide  


 


Anion Gap  


 


BUN  


 


Creatinine  


 


Est GFR (African Amer)  


 


Glucose  


 


Calcium  


 


Magnesium  








                                        











  12/25/19 12/25/19 12/25/19





  20:45 20:45 23:14


 


Creatine Kinase  46  


 


Troponin I   0.025  0.027


 


NT-Pro-B Natriuret Pep   2580 H 














  12/26/19 12/26/19 12/29/19





  05:05 11:08 07:58


 


Creatine Kinase   


 


Troponin I  0.027  0.024  0.015


 


NT-Pro-B Natriuret Pep   











Impressions: 


                                        





Chest X-Ray  12/29/19 00:00


IMPRESSION:  Chronic lung changes, stable appearance.


 








Head CT  12/29/19 00:00


IMPRESSION:  No hemorrhage.  9 mm ovoid hyperdensity in the left cerebellar 

hemisphere suggesting lacunar infarction, possibly acute.


EVIDENCE OF ACUTE STROKE: YES.  LEFT VERTEBROBASILAR


 














Assessment and Plan





- Diagnosis


(1) Acute and chronic respiratory failure with hypoxia


Is this a current diagnosis for this admission?: Yes   





(2) Acute on chronic diastolic congestive heart failure


Is this a current diagnosis for this admission?: Yes   





(3) Chronic obstructive pulmonary disease (COPD)


Qualifiers: 


   COPD type: unspecified COPD   Qualified Code(s): J44.9 - Chronic obstructive 

pulmonary disease, unspecified   


Is this a current diagnosis for this admission?: Yes   





(4) Diabetes mellitus type 2 in obese


Is this a current diagnosis for this admission?: Yes   





(5) Morbid obesity with BMI of 40.0-44.9, adult


Is this a current diagnosis for this admission?: Yes   





(6) Normochromic normocytic anemia


Is this a current diagnosis for this admission?: Yes

## 2019-12-29 NOTE — EKG REPORT
SEVERITY:- ABNORMAL ECG -

SINUS TACHYCARDIA

LVH WITH IVCD, LAD AND SECONDARY REPOL ABNRM

:

Confirmed by: Nuvia Abdul MD 29-Dec-2019 19:28:18

## 2019-12-29 NOTE — PROGRESS NOTE
Provider Note


Provider Note: 





CARDIOLOGY PROGRESS NOTE by Dr. Nuvia Ramos on 12/29/2019.





OBJECTIVE: The patient had an RRT response called this morning due to poor 

response to questions with some of severe somnolence and also significant 

shortness of breath.  She was placed on BiPAP and it was thought that this is 

due to secondary to the patient receiving Phenergan and also due to hypercapnia.

 In spite of the BiPAP although the patient became more awake and answer 

questions appropriately she continued to have some degree of somnolence since.  

And the blood gases did not improve a lot.  With the PCO2 coming down from 58.82

54.8.  The patient also had a O2 sat dropped to 75%.  The patient's definitely 

now is in atrial flutter.  And hence the patient has been started on IV 

Cardizem.  Her records from [place] were reviewed.  There is one EKG done 

in 2017 which showed that probably the patient had atrial flutter at that time a

lthough the official report on the patient is that the patient has sinus 

tachycardia on the EKG.  But I am convinced that the patient most likely is an 

EKG of atrial flutter.  Would strongly as discussed with the hospitalist getting

a CTA of the head to make sure there is no intracranial pathology and if she 

continues to have shortness of breath transferred to ICU for possible 

anticipation of the patient requiring intubation.  This has been discussed in 

detail with the family.  This also could explain that the patient has been 

asymptomatic and unaware of her palpitations, the tachycardia could be causing 

the patient's cardiomyopathy.








PHYSICAL EXAMINATION: The patient morbidly obese.  She is somnolent and is on a 

BiPAP.


                                                                Selected Entries











  12/29/19





  14:48


 


Temperature 98.1 F


 


Temperature Axillary





Source 


 


Pulse Rate 63


 


Respiratory 25 H





Rate 


 


Blood Pressure 129/61 H


 


Blood Pressure 83





Mean 


 


BP Location Left Arm


 


BP Position Supine


 


O2 Sat by Pulse 100





Oximetry 


 


Oxygen Delivery Bipap





Method 





HEAD: Is atraumatic.  Normocephalic.  EYES: Pupils are equal round regular 

reactive to light and accommodation.  Extraocular movements are normal there is 

no conjunctival pallor.  There is no scleral icterus.  EARS: Tympanic membranes 

are intact.  External auditory canals are clear.  NOSE: There is no deviated 

nasal septum.  There is no inflammation of the nasal mucous membrane.  MOUTH: 

There is no ulcers in the mouth.  Mucous membranes of the mouth are moist.  

THROAT: There is no redness of the oropharynx.  There is no exudates.  SKIN: 

There is no skin rashes.  There is no petechia or ecchymosis.  NECK: Is supple. 

There is mild JVD present.  Carotids are equal there is no bruit.  There is no 

lymphadenopathy.  There is no goiter.  There is no accessory muscle respiration 

use.  Trachea is central.  LUNGS: Shows diminished air entry prolonged 

expiration.  There is scattered end expiratory wheezing and rhonchi.  There is 

"Velcro" [E] rales of pulmonary fibrosis bilaterally in the bases.  There is a 

few fine rales of CHF.  HEART: S1-S2 is heard.  There is no S3 gallop.  There is

 no S4 gallop.  There is systolic murmur of tricuspid regurgitation and mild 

mitral regurgitation murmur present.  There is no S3 gallop.  There is no S4 

gallop.  There is no rub.  ABDOMEN: Is obese.  Nontender.  There is no 

paraspinal megaly.  Bowel sounds are well heard.  EXTREMITIES: Femorals are 

deep.  Femorals are diminished there is no femoral bruits.  Leg pulses are 

diminished.  There is 1+ bilateral edema.  There is no DVT or cellulitis.  There

 is no cyanosis or clubbing.  There is no calf tenderness.  CNS: The patient is 

conscious awake alert oriented x3 with no focal deficit.  PSYCHIATRIC:.  The 

patient judgment insight are intact her affect is normal.


                              Labs- All tests 24 hr











  12/29/19 12/29/19 12/29/19





  05:05 07:36 07:58


 


WBC    7.8


 


RBC    3.94


 


Hgb    12.6


 


Hct    37.0


 


MCV    94


 


MCH    32.0


 


MCHC    34.1


 


RDW    14.5 H


 


Plt Count    183


 


Lymph % (Auto)    5.4 L


 


Mono % (Auto)    4.9


 


Eos % (Auto)    0.1


 


Baso % (Auto)    0.2


 


Absolute Neuts (auto)    7.0


 


Absolute Lymphs (auto)    0.4 L


 


Absolute Monos (auto)    0.4


 


Absolute Eos (auto)    0.0


 


Absolute Basos (auto)    0.0


 


Seg Neutrophils %    89.4 H


 


Carbonic Acid   


 


HCO3/H2CO3 Ratio   


 


ABG pH   


 


ABG pCO2   


 


ABG pO2   


 


ABG HCO3   


 


ABG Total CO2   


 


ABG O2 Saturation   


 


ABG Base Excess   


 


VBG pH   


 


VBG pCO2   


 


VBG HCO3   


 


VBG Base Excess   


 


FiO2   


 


Sodium   


 


Potassium   


 


Chloride   


 


Carbon Dioxide   


 


Anion Gap   


 


BUN   


 


Creatinine   


 


Est GFR ( Amer)   


 


Est GFR (MDRD) Non-Af   


 


Glucose   


 


POC Glucose   282 H 


 


Calcium   


 


Magnesium   


 


Troponin I   


 


Digoxin  1.09  














  12/29/19 12/29/19 12/29/19





  07:58 07:58 07:58


 


WBC   


 


RBC   


 


Hgb   


 


Hct   


 


MCV   


 


MCH   


 


MCHC   


 


RDW   


 


Plt Count   


 


Lymph % (Auto)   


 


Mono % (Auto)   


 


Eos % (Auto)   


 


Baso % (Auto)   


 


Absolute Neuts (auto)   


 


Absolute Lymphs (auto)   


 


Absolute Monos (auto)   


 


Absolute Eos (auto)   


 


Absolute Basos (auto)   


 


Seg Neutrophils %   


 


Carbonic Acid    1.80 H


 


HCO3/H2CO3 Ratio    18:1


 


ABG pH    7.35


 


ABG pCO2    59.9 H


 


ABG pO2    83.7


 


ABG HCO3    32.6 H


 


ABG Total CO2    34.5 H


 


ABG O2 Saturation    95.6


 


ABG Base Excess    5.4


 


VBG pH   


 


VBG pCO2   


 


VBG HCO3   


 


VBG Base Excess   


 


FiO2    30%


 


Sodium  137.8  


 


Potassium  4.3  


 


Chloride  95 L  


 


Carbon Dioxide  34 H  


 


Anion Gap  9  


 


BUN  19  


 


Creatinine  0.69  


 


Est GFR ( Amer)  > 60  


 


Est GFR (MDRD) Non-Af  > 60  


 


Glucose  274 H  


 


POC Glucose   


 


Calcium  9.0  


 


Magnesium  1.7  


 


Troponin I   0.015 


 


Digoxin   














  12/29/19 12/29/19 12/29/19





  10:48 14:30 15:10


 


WBC   


 


RBC   


 


Hgb   


 


Hct   


 


MCV   


 


MCH   


 


MCHC   


 


RDW   


 


Plt Count   


 


Lymph % (Auto)   


 


Mono % (Auto)   


 


Eos % (Auto)   


 


Baso % (Auto)   


 


Absolute Neuts (auto)   


 


Absolute Lymphs (auto)   


 


Absolute Monos (auto)   


 


Absolute Eos (auto)   


 


Absolute Basos (auto)   


 


Seg Neutrophils %   


 


Carbonic Acid    1.77 H


 


HCO3/H2CO3 Ratio    21:1


 


ABG pH    7.42


 


ABG pCO2    58.8 H


 


ABG pO2    75.1 L


 


ABG HCO3    37.2 H


 


ABG Total CO2    39.0 H


 


ABG O2 Saturation    95.0


 


ABG Base Excess    10.5


 


VBG pH   7.29 L 


 


VBG pCO2   85.8 H* 


 


VBG HCO3   40.7 H 


 


VBG Base Excess   10.6 


 


FiO2    28%


 


Sodium   


 


Potassium   


 


Chloride   


 


Carbon Dioxide   


 


Anion Gap   


 


BUN   


 


Creatinine   


 


Est GFR ( Amer)   


 


Est GFR (MDRD) Non-Af   


 


Glucose   


 


POC Glucose  278 H  


 


Calcium   


 


Magnesium   


 


Troponin I   


 


Digoxin   














  12/29/19 12/29/19





  16:56 21:31


 


WBC  


 


RBC  


 


Hgb  


 


Hct  


 


MCV  


 


MCH  


 


MCHC  


 


RDW  


 


Plt Count  


 


Lymph % (Auto)  


 


Mono % (Auto)  


 


Eos % (Auto)  


 


Baso % (Auto)  


 


Absolute Neuts (auto)  


 


Absolute Lymphs (auto)  


 


Absolute Monos (auto)  


 


Absolute Eos (auto)  


 


Absolute Basos (auto)  


 


Seg Neutrophils %  


 


Carbonic Acid  


 


HCO3/H2CO3 Ratio  


 


ABG pH  


 


ABG pCO2  


 


ABG pO2  


 


ABG HCO3  


 


ABG Total CO2  


 


ABG O2 Saturation  


 


ABG Base Excess  


 


VBG pH  


 


VBG pCO2  


 


VBG HCO3  


 


VBG Base Excess  


 


FiO2  


 


Sodium  


 


Potassium  


 


Chloride  


 


Carbon Dioxide  


 


Anion Gap  


 


BUN  


 


Creatinine  


 


Est GFR ( Amer)  


 


Est GFR (MDRD) Non-Af  


 


Glucose  


 


POC Glucose  181 H  106


 


Calcium  


 


Magnesium  


 


Troponin I  


 


Digoxin  








                                        





Chest X-Ray  12/25/19 19:52


IMPRESSION:


 


Equivocal right basilar airspace opacity. There is elevation


right hemidiaphragm. Cardiac megaly


 


 


copyright 2011 [employer]- All Rights Reserved


 








Chest X-Ray  12/29/19 00:00


IMPRESSION:  Chronic lung changes, stable appearance.


 





Impression/RECOMMENDATION:





1.  Acute on chronic respiratory failure.  Continue supplemental oxygen, and 

current anti-COPD medication.  This is worsening.


2.  Acute infective bronchitis/possible right lower lobe pneumonia: Continue 

current treatment including antibiotics.


3.  Intermittent altered sensorium with somnolence since most likely secondary 

to hypercapnia, but would also recommend getting the patient a CT of the head to

 make sure there is no acute intracranial pathology. 


4.  Most likely acute on chronic systolic heart failure: Continue diuretics, in 

view of the patient's current current wheezing will not start the patient on 

Toprol-XL at present.  We will start the patient on Entresto.  If tachycardia 

persists will give 1 dose of digoxin.  The patient still has some wheezing and 

hence we will start not start Toprol-XL will try from tomorrow.


5.  Pulmonary fibrosis.?  Right lower lobe pneumonia.  We will repeat the patie

nt's chest x-ray in a.m.


6.  History of asthma and COPD.


7.  Hypertension: Continue current medication.  Increase Entresto as tolerated. 

 Later would add a beta-blocker.


8. Atrial flutter.  There is also right bundle branch block pattern and left 

anterior fascicular block.  [Bifascicular block].  This raises the possibility 

of post tachycardia induced cardiomyopathy.  Will increase the patient's Toprol-

XL to 50 mg p.o. every 12 hours.  In view of the cardiomyopathy we will continue

patient on Entresto.  In view of the patient's heart rate still being up we will

start the patient on IV Cardizem infusion at 7.5 mg/h.  Also her old records 

from [place] were reviewed.  There is one EKG done which is suspicious for

atrial flutter although the official reading is sinus tachycardia.  But I 

strongly believe that that EKG is atrial flutter, and hence the patient has had 

longstanding atrial fibrillation or at least intermittent paroxysmal atrial 

fibrillation since 2017, the data of that prior EKG from [place].


9.  Diabetes mellitus type 2 non-insulin-dependent.


10.  Hypothyroidism:: Continue thyroid replacement.  Note the patient's repeat 

TSH is normal, and free T3 and free T4 are normal...  


11 Moderate to severe tricuspid regurgitation.  At least moderate pulmonary 

hypertension with right ventricle systolic pressure of 53 to 58 mmHg.


12.  History of pulmonary embolism: Patient states she is on Xarelto.  Will 

continue Xarelto.  


13.  History of breast cancer status post bilateral mastectomies and 

chemotherapy and radiation treatment.


14.  History of multiple abdominal surgeries.


15.  Morbid obesity.





Medications reviewed.  Medical regimen and management plan discussed with the 

hospitalist.  Discussed with the family also.  Medical decision making is of hi

gh complexity.  40 minutes spent as patient more than 50% of time spent in 

direct patient care.  Will follow.

## 2019-12-29 NOTE — RADIOLOGY REPORT (SQ)
EXAM DESCRIPTION:  CT HEAD WITHOUT



COMPLETED DATE/TIME:  12/29/2019 5:52 pm



REASON FOR STUDY:  AMS



COMPARISON:  None.



TECHNIQUE:  Axial images acquired through the brain without intravenous contrast. Images reviewed wit
h bone, brain and subdural windows.  Images stored on PACS.

All CT scanners at this facility use dose modulation, iterative reconstruction, and/or weight based d
osing when appropriate to reduce radiation dose to as low as reasonably achievable (ALARA).

[facility]: Dose Right  [facility]: CareDose    [facility]: Dose Right    [facility]: Teradose 4D    [facility]: Smart Earbits



RADIATION DOSE:  CT Rad equipment meets quality standard of care and radiation dose reduction techniq
ues were employed. CTDIvol: 53.2 mGy. DLP: 964 mGy-cm..



LIMITATIONS:  None.



FINDINGS:  VENTRICLES: Normal size and contour.

CEREBRUM: No masses. No hemorrhage. No midline shift. Age appropriate white matter. No evidence for a
cute infarction.

CEREBELLUM:  No hemorrhage.  9 mm ovoid hyperdensity in the left cerebellar hemisphere suggesting lac
unar infarction, age undetermined.

EXTRA-AXIAL SPACES: No fluid collections.

ORBITS AND GLOBE: No intra- or extraconal masses. Normal contour of globe without masses.

CALVARIUM: No fracture.

PARANASAL SINUSES: No fluid or mucosal thickening.

SOFT TISSUES: No mass or hematoma.

OTHER: No other significant finding.



IMPRESSION:  No hemorrhage.  9 mm ovoid hyperdensity in the left cerebellar hemisphere suggesting lac
unar infarction, possibly acute.

EVIDENCE OF ACUTE STROKE: YES.  LEFT VERTEBROBASILAR



COMMENT:   The findings were sent to the [facility] at 18:27 on 12/29/201
9 to be communicated to a licensed caregiver.



TECHNICAL DOCUMENTATION:  JOB ID:  [id number]

[id number]

Quality ID # [id number]: Final reports with documentation of one or more dose reduction techniques (e.g., Au
tomated exposure control, adjustment of the mA and/or kV according to patient size, use of iterative 
reconstruction technique)

 2011 [employer]- All Rights Reserved



Reading location - IP/workstation name: [email or web address]

## 2019-12-29 NOTE — CRITICAL CARE ADMISSION REPORT
HPI


Date:: 19


Time:: 18:48


Reason for ICU Reason:: acute on chronic respiratory failure, altered mental 

status


HPI: 





Pt is a 78 yo woman with COPD on home O2, CHF who was admitted on  with 

increasing shortness of breath. She was admitted to the hospitalist service and 

was treated for a COPD exacerbation. She was also found to be in atrial flutter 

and has been on a cardizem drip for that.  This am around 7:30 am, a rapid 

response was called b/c pt was found to be lethargic. She was placed on bipap. 

It was thought that her lethargy was due to the phenergan she had received 

earlier. Dr. Mccain was concerned that pt's mentation was not improving so he 

asked that pt be transferred to the ICU at around 6:30 pm today. Upon my 

evaluation of the pt in the IMCU, she is on bipap, but she is easily arousable 

and follows commands. Her daughter is at the bedside.





Past Medical History


Cardiac Medical History: Reports: Congestive Heart Failure, Hypertension


   Denies: Atrial Fibrillation


Pulmonary Medical History: Reports: Asthma, Chronic Obstructive Pulmonary 

Disease (COPD), Pneumonia, Respiratory Failure - Chronic respiratory failure 

with oxygen dependence


EENT Medical History: Reports: Cataracts - Bilateral


   Denies: Ears - Hearing aids


Neurological Medical History: 


   Denies: Hemorrhagic CVA, Ischemic CVA, Seizures


Endocrine Medical History: Reports: Diabetes Mellitus Type 2, Obesity


   Denies: Diabetes Mellitus Type 1, Hyperthyroidism, Hypothyroidism


Renal/ Medical History: 


   Denies: Chronic Kidney Disease, Nephrolithiasis


Malignancy Medical History: Reports: Breast Cancer - Status post bilateral 

mastectomies


GI Medical History: Reports: Hiatal Hernia


   Denies: Cirrhosis, Crohn's Disease, Hepatitis, Ulcerative Colitis


Musculoskeltal Medical History: Reports: Arthritis


   Denies: Gout


Skin Medical History: 


   Denies: Eczema, Psoriasis


Psychiatric Medical History: 


   Denies: Alcohol Dependency, Substance Abuse, Tobacco Dependency


Traumatic Medical History: Reports: None


Hematology: 


   Denies: Anemia, Bleeding Tendencies


Infectious Medical History: Reports: None





Past Surgical History


Past Surgical History: Reports: Appendectomy, Cholecystectomy, Hysterectomy, 

Mastectomy - bilateral, Other - Bilateral cataract surgery, 5 colon surgeries fo

r abnormal growths





Social/Family History





- Social History


Lives with: Family


Smoking Status: Former Smoker


Number of Years Smokin


Last Time Smoked: 


Frequency of Alcohol Use: None


Hx Recreational Drug Use: No


Drugs: None


Hx Prescription Drug Abuse: No





- Medication/Allergies


Home Medications: 








Fluticasone/Umeclidin/Vilanter [Trelegy 100-62.5-25 Mcg Ellipta 14 Dose/Dpi] 1 

puff IH DAILY 19 


Furosemide [Lasix 20 mg Tablet] 20 mg PO QAM 19 


Glimepiride 2 mg PO DAILY 19 


Hydrocodone/Acetaminophen [Norco 5-325 Tablet] 1 each PO Q6HP PRN 19 


Levothyroxine Sodium [Synthroid 0.025 mg Tablet] 25 mcg PO DAILY 19 


Potassium Chloride [Klor-Con M20] 20 meq PO DAILY 19 


Pregabalin [Lyrica 100 Mg Capsule] 100 mg PO TID 19 


Rivaroxaban [Xarelto] 20 mg PO DAILY 19 








Allergies/Adverse Reactions: 


                                        





albuterol Allergy (Verified 19 19:53)


   


aspirin Allergy (Verified 19 19:53)


   


diphenhydramine [From Benadryl] Allergy (Verified 19 19:53)


   











Review of Systems


ROS unobtainable: Due to mental status, Other - pt on bipap





Physical Exam


Vital Signs: 


                                        











Temp Pulse Resp BP Pulse Ox


 


 98.1 F   115 H  23 H  122/81   98 


 


 19 14:48  19 18:36  19 16:04  19 18:00  19 16:04








                                 Intake & Output











 19





 06:59 06:59 06:59


 


Intake Total 980 1720 18


 


Output Total 4000 1600 800


 


Balance -3020 120 -782


 


Weight 103.9 kg 103.6 kg 








                                  Weight/Height





Weight                           103.6 kg


Height                           5 ft








General appearance: PRESENT: no acute distress, well-developed, well-nourished, 

other - on bipap, awake, follows commands, NAD


Head exam: PRESENT: atraumatic, normocephalic


Respiratory exam: PRESENT: decreased breath sounds, unlabored


Cardiovascular exam: PRESENT: irregular rhythm


GI/Abdominal exam: PRESENT: soft


Neurological exam: PRESENT: alert, awake





Laboratory/Radiographs


Laboratory Results: 


                                        





                                 19 07:58 





                                 19 07:58 





                                        











  19





  07:58 07:58 07:58


 


WBC  7.8  


 


RBC  3.94  


 


Hgb  12.6  


 


Hct  37.0  


 


MCV  94  


 


MCH  32.0  


 


MCHC  34.1  


 


RDW  14.5 H  


 


Plt Count  183  


 


Seg Neutrophils %  89.4 H  


 


Carbonic Acid    1.80 H


 


HCO3/H2CO3 Ratio    18:1


 


ABG pH    7.35


 


ABG pCO2    59.9 H


 


ABG pO2    83.7


 


ABG HCO3    32.6 H


 


ABG O2 Saturation    95.6


 


ABG Base Excess    5.4


 


VBG pH   


 


VBG pCO2   


 


VBG HCO3   


 


VBG Base Excess   


 


FiO2    30%


 


Sodium   137.8 


 


Potassium   4.3 


 


Chloride   95 L 


 


Carbon Dioxide   34 H 


 


Anion Gap   9 


 


BUN   19 


 


Creatinine   0.69 


 


Est GFR ( Amer)   > 60 


 


Glucose   274 H 


 


Calcium   9.0 


 


Magnesium   1.7 














  19





  14:30 15:10


 


WBC  


 


RBC  


 


Hgb  


 


Hct  


 


MCV  


 


MCH  


 


MCHC  


 


RDW  


 


Plt Count  


 


Seg Neutrophils %  


 


Carbonic Acid   1.77 H


 


HCO3/H2CO3 Ratio   21:1


 


ABG pH   7.42


 


ABG pCO2   58.8 H


 


ABG pO2   75.1 L


 


ABG HCO3   37.2 H


 


ABG O2 Saturation   95.0


 


ABG Base Excess   10.5


 


VBG pH  7.29 L 


 


VBG pCO2  85.8 H* 


 


VBG HCO3  40.7 H 


 


VBG Base Excess  10.6 


 


FiO2   28%


 


Sodium  


 


Potassium  


 


Chloride  


 


Carbon Dioxide  


 


Anion Gap  


 


BUN  


 


Creatinine  


 


Est GFR (African Amer)  


 


Glucose  


 


Calcium  


 


Magnesium  








                                        











  19





  20:45 20:45 23:14


 


Creatine Kinase  46  


 


Troponin I   0.025  0.027


 


NT-Pro-B Natriuret Pep   2580 H 














  19





  05:05 11:08 07:58


 


Creatine Kinase   


 


Troponin I  0.027  0.024  0.015


 


NT-Pro-B Natriuret Pep   











Impressions: 


                                        





Chest X-Ray  19 00:00


IMPRESSION:  Chronic lung changes, stable appearance.


 








Head CT  19 00:00


IMPRESSION:  No hemorrhage.  9 mm ovoid hyperdensity in the left cerebellar 

hemisphere suggesting lacunar infarction, possibly acute.


EVIDENCE OF ACUTE STROKE: YES.  LEFT VERTEBROBASILAR


 














Critical Time


Critical Time (minutes): 35


-: 


The care of a critically ill patient is dynamic.  This note represents a static 

moment in the admission process. Orders and treatments may be given 

simultaneously and urgently, and time is not representative of the treatment 

process.





This patient requires Critical Care secondary to life threatening organ or limb 

dysfunction.  Without Critical Care services, the patient is at risk for 

increased mortality and morbidity.








Provider Note


Provider Note: 





Assessment: critically ill 78 yo woman with acute on chronic respiratory 

failure, AECOPD, atrial flutter,h/o PE, acute CHF, DM, h/o pulmonary fibrosis, 

altered mental status, acute lacunar infarction.





Plan:


1. Respiratory: acute on chronic respiratory failure. Continue bipap. Monitor 

respiratory status closely. Pt's mentation is improving.  No need for intubation

at this time


2. Pulmonary: AECOPD, h/o pulmonary fibrosis. Continue levaquine, steroids, 

bronchodilators


3. CV: atrial flutter, acute CHF, cardiomyopathy with EF of 35%. On cardizem 

drip,toprol, entresto. Care per Dr. Ruiz. 


4. Heme: on xarelto


5. Neuro: head CT shows acute lacunar infarct. Will order MRI. Will need 

PT/OT/ST


6. Endocrine: DM. Accuchecks, SSI. Hypothyroidism, synthroid


7. Nutrition: NPO


8. Prophylaxis: pharmacologic DVT prophlaxis not indicated b/c pt on xarelto





Critical care time=40 min, excluding procedures

## 2019-12-30 LAB
ABSOLUTE LYMPHOCYTES# (MANUAL): 0.9 10^3/UL (ref 0.5–4.7)
ABSOLUTE MONOCYTES # (MANUAL): 0.1 10^3/UL (ref 0.1–1.4)
ADD MANUAL DIFF: YES
ANION GAP SERPL CALC-SCNC: 9 MMOL/L (ref 5–19)
ARTERIAL BLOOD FIO2: (no result)
ARTERIAL BLOOD H2CO3: 1.89 MMOL/L (ref 1.05–1.35)
ARTERIAL BLOOD HCO3: 33.3 MMOL/L (ref 20–24)
ARTERIAL BLOOD PCO2: 62.7 MMHG (ref 35–45)
ARTERIAL BLOOD PH: 7.34 (ref 7.35–7.45)
ARTERIAL BLOOD PO2: 83.9 MMHG (ref 80–100)
ARTERIAL BLOOD TOTAL CO2: 35.2 MMOL/L (ref 21–25)
BASE EXCESS BLDA CALC-SCNC: 5.7 MMOL/L
BASOPHILS NFR BLD MANUAL: 0 % (ref 0–2)
BUN SERPL-MCNC: 30 MG/DL (ref 7–20)
CALCIUM: 8.6 MG/DL (ref 8.4–10.2)
CHLORIDE SERPL-SCNC: 93 MMOL/L (ref 98–107)
CO2 SERPL-SCNC: 35 MMOL/L (ref 22–30)
DIGOXIN SERPL-MCNC: 0.91 NG/ML (ref 0.8–2)
EOSINOPHIL NFR BLD MANUAL: 0 % (ref 0–6)
ERYTHROCYTE [DISTWIDTH] IN BLOOD BY AUTOMATED COUNT: 14.9 % (ref 11.5–14)
GLUCOSE SERPL-MCNC: 123 MG/DL (ref 75–110)
HCT VFR BLD CALC: 38.9 % (ref 36–47)
HGB BLD-MCNC: 13 G/DL (ref 12–15.5)
MCH RBC QN AUTO: 31.2 PG (ref 27–33.4)
MCHC RBC AUTO-ENTMCNC: 33.4 G/DL (ref 32–36)
MCV RBC AUTO: 93 FL (ref 80–97)
MONOCYTES % (MANUAL): 1 % (ref 3–13)
NEUTS BAND NFR BLD MANUAL: 6 % (ref 3–5)
PLATELET # BLD: 198 10^3/UL (ref 150–450)
PLATELET COMMENT: ADEQUATE
POTASSIUM SERPL-SCNC: 4.4 MMOL/L (ref 3.6–5)
RBC # BLD AUTO: 4.17 10^6/UL (ref 3.72–5.28)
RBC MORPH BLD: (no result)
SAO2 % BLDA: 95.5 % (ref 94–98)
SEGMENTED NEUTROPHILS % (MAN): 86 % (ref 42–78)
TOTAL CELLS COUNTED BLD: 100
VARIANT LYMPHS NFR BLD MANUAL: 7 % (ref 13–45)
WBC # BLD AUTO: 13.4 10^3/UL (ref 4–10.5)

## 2019-12-30 RX ADMIN — SACUBITRIL AND VALSARTAN SCH TAB: 24; 26 TABLET, FILM COATED ORAL at 18:37

## 2019-12-30 RX ADMIN — METHYLPREDNISOLONE SODIUM SUCCINATE SCH MG: 40 INJECTION, POWDER, FOR SOLUTION INTRAMUSCULAR; INTRAVENOUS at 06:10

## 2019-12-30 RX ADMIN — CEFEPIME SCH: 2 INJECTION, POWDER, FOR SOLUTION INTRAMUSCULAR; INTRAVENOUS at 23:02

## 2019-12-30 RX ADMIN — BUDESONIDE SCH MG: 0.5 SUSPENSION RESPIRATORY (INHALATION) at 08:04

## 2019-12-30 RX ADMIN — IPRATROPIUM BROMIDE SCH MG: 0.5 SOLUTION RESPIRATORY (INHALATION) at 15:26

## 2019-12-30 RX ADMIN — IPRATROPIUM BROMIDE SCH MG: 0.5 SOLUTION RESPIRATORY (INHALATION) at 23:53

## 2019-12-30 RX ADMIN — INSULIN HUMAN SCH: 100 INJECTION, SOLUTION PARENTERAL at 18:34

## 2019-12-30 RX ADMIN — PREGABALIN SCH: 100 CAPSULE ORAL at 15:08

## 2019-12-30 RX ADMIN — VANCOMYCIN HYDROCHLORIDE SCH MLS/HR: 1 INJECTION, POWDER, LYOPHILIZED, FOR SOLUTION INTRAVENOUS at 12:20

## 2019-12-30 RX ADMIN — INSULIN HUMAN SCH: 100 INJECTION, SOLUTION PARENTERAL at 21:52

## 2019-12-30 RX ADMIN — METHYLPREDNISOLONE SODIUM SUCCINATE SCH MG: 40 INJECTION, POWDER, FOR SOLUTION INTRAMUSCULAR; INTRAVENOUS at 21:51

## 2019-12-30 RX ADMIN — METHYLPREDNISOLONE SODIUM SUCCINATE SCH MG: 40 INJECTION, POWDER, FOR SOLUTION INTRAMUSCULAR; INTRAVENOUS at 15:53

## 2019-12-30 RX ADMIN — PREGABALIN SCH MG: 100 CAPSULE ORAL at 06:10

## 2019-12-30 RX ADMIN — FAMOTIDINE SCH MG: 20 TABLET, FILM COATED ORAL at 10:31

## 2019-12-30 RX ADMIN — Medication PRN MLS/HR: at 12:19

## 2019-12-30 RX ADMIN — Medication PRN MLS/HR: at 03:20

## 2019-12-30 RX ADMIN — Medication SCH: at 21:13

## 2019-12-30 RX ADMIN — PREGABALIN SCH MG: 100 CAPSULE ORAL at 21:57

## 2019-12-30 RX ADMIN — DOCUSATE SODIUM SCH MG: 100 CAPSULE, LIQUID FILLED ORAL at 10:31

## 2019-12-30 RX ADMIN — CEFEPIME SCH MLS/HR: 2 INJECTION, POWDER, FOR SOLUTION INTRAMUSCULAR; INTRAVENOUS at 10:33

## 2019-12-30 RX ADMIN — RIVAROXABAN SCH MG: 10 TABLET, FILM COATED ORAL at 18:35

## 2019-12-30 RX ADMIN — PREGABALIN SCH MG: 100 CAPSULE ORAL at 18:36

## 2019-12-30 RX ADMIN — INSULIN HUMAN SCH: 100 INJECTION, SOLUTION PARENTERAL at 09:59

## 2019-12-30 RX ADMIN — METOPROLOL SUCCINATE SCH MG: 50 TABLET, EXTENDED RELEASE ORAL at 10:31

## 2019-12-30 RX ADMIN — LEVOTHYROXINE SODIUM SCH MG: 50 TABLET ORAL at 06:10

## 2019-12-30 RX ADMIN — METOPROLOL SUCCINATE SCH MG: 50 TABLET, EXTENDED RELEASE ORAL at 21:52

## 2019-12-30 RX ADMIN — Medication SCH: at 15:08

## 2019-12-30 RX ADMIN — IPRATROPIUM BROMIDE SCH MG: 0.5 SOLUTION RESPIRATORY (INHALATION) at 08:04

## 2019-12-30 RX ADMIN — INSULIN HUMAN SCH: 100 INJECTION, SOLUTION PARENTERAL at 12:15

## 2019-12-30 RX ADMIN — BUDESONIDE SCH MG: 0.5 SUSPENSION RESPIRATORY (INHALATION) at 20:16

## 2019-12-30 RX ADMIN — SACUBITRIL AND VALSARTAN SCH TAB: 24; 26 TABLET, FILM COATED ORAL at 12:19

## 2019-12-30 RX ADMIN — Medication SCH ML: at 06:10

## 2019-12-30 RX ADMIN — DOCUSATE SODIUM SCH MG: 100 CAPSULE, LIQUID FILLED ORAL at 18:35

## 2019-12-30 RX ADMIN — FAMOTIDINE SCH MG: 20 TABLET, FILM COATED ORAL at 21:52

## 2019-12-30 NOTE — RADIOLOGY REPORT (SQ)
EXAM DESCRIPTION:  MRA HEAD WITHOUT



COMPLETED DATE/TIME:  12/30/2019 5:01 pm



REASON FOR STUDY:  ACUTE CVA



COMPARISON:  None.



TECHNIQUE:  Axial 3-D time-of-flight acquisition imaging performed through the brain in the area of t
he [place] of [place].  Images reformatted using 3-D MIPS.



LIMITATIONS:  None.



FINDINGS:  SOURCE IMAGES: No unexpected findings on source images.  No large masses.

3-D MIP: No aneurysm.  No occlusions.  No significant stenosis.

OTHER: No other significant finding.



IMPRESSION:  NORMAL MRA OF THE [place].



TECHNICAL DOCUMENTATION:  JOB ID:  [id number]

 2011 [employer]- All Rights Reserved



Reading location - IP/workstation name: [email or web address]

## 2019-12-30 NOTE — RADIOLOGY REPORT (SQ)
EXAM DESCRIPTION:  MRI HEAD WITHOUT



COMPLETED DATE/TIME:  12/30/2019 4:53 pm



REASON FOR STUDY:  acute cva



COMPARISON:  CT dated 12/29/2019.



TECHNIQUE:  Multiplanar imaging includes non-contrasted T1, T2, FLAIR, and diffusion with ADC map seq
uences. Images stored on PACS.



LIMITATIONS:  None.



FINDINGS:  ANATOMY: No anomalies. Normal vascular flow voids. Pituitary fossa normal.

CSF SPACES: Normal in size and contour. No hemorrhage.

CEREBRUM: Sulci and gyri normal in size and contour. Normal white matter signal on FLAIR imaging.  No
 evidence of hemorrhage, mass, or extraaxial fluid collection.

POSTERIOR FOSSA: Old lacunar infarct in the left cerebellar hemisphere. No hemorrhage. No edema, mass
es or mass effect.  Internal auditory canals, cerebello-pontine angles, mastoids normal.

DIFFUSION IMAGING: Negative for acute or sub-acute infarction.

ORBITS: No masses. Globes normal.

PARANASAL  SINUSES: No fluid levels.  Mucosa normal.

OTHER: No other significant finding.



IMPRESSION:  OLD LACUNAR INFARCT IN THE LEFT CEREBELLAR HEMISPHERE.  NO OTHER SIGNIFICANT OR ACUTE FI
NDINGS.

EVIDENCE OF ACUTE STROKE: NO.



TECHNICAL DOCUMENTATION:  JOB ID:  [id number]

 2011 [employer]- All Rights Reserved



Reading location - IP/workstation name: [email or web address]

## 2019-12-30 NOTE — RADIOLOGY REPORT (SQ)
EXAM DESCRIPTION:  CAROTID DOPPLER



COMPLETED DATE/TIME:  12/30/2019 12:15 pm



REASON FOR STUDY:  acute CVA



COMPARISON:  None.



TECHNIQUE:  Grayscale ultrasound, Doppler velocity and spectra, and color Doppler images acquired of 
the extra-cranial carotid and vertebral arteries. Images stored on PACS.



LIMITATIONS:  None.



FINDINGS:  RIGHT CAROTID

CCA Velocities: Within normal limits.

ICA Velocities

 Peak systolic 59 cm/s.

 End diastolic 22 cm/s.

Proximal ICA/CCA peak systolic ratio 1.2.

Spectra normal. No significant plaque.

LEFT CAROTID

CCA Velocities: Within normal limits.

ICA Velocities

 Peak systolic 71 cm/s.

 End diastolic 21 cm/s.

Proximal ICA/CCA peak systolic ratio 1.3.

Spectra normal. No significant plaque.

VERTEBRAL ARTERIES: Antegrade flow.  Normal waveforms.

SUBCLAVIAN ARTERIES: No finding.

OTHER: No other significant finding.



IMPRESSION:  NO HEMODYNAMICALLY SIGNIFICANT STENOSIS.



COMMENT:  Quality ID #195:  Velocity criteria are extrapolated from the diameter data as defined by t
he Society of Radiologists in Ultrasound Consensus Conference. Radiology 2003: 229; 340-346.



TECHNICAL DOCUMENTATION:  JOB ID:  [id number]

[id number]

 2011 [id number]- All Rights Reserved



Reading location - IP/workstation name: [email or web address]

## 2019-12-30 NOTE — PROGRESS NOTE
Provider Note


Provider Note: 





CARDIOLOGY PROGRESS NOTE by Dr. Nuvia Ramos on 12/30/2019.





SUBJECTIVE: The patient is more awake and alert and denies any chest pain 

discomfort.  She is lying flat in bed.  She states that shortness of breath is 

much improved.  There is no cough or wheezing.  There is no PND.  She continues 

to be in atrial flutter with a ventricular response in the 80s to the 110s.  She

had a CT scan of the head due to somnolence since yesterday and this cleared 

back with the report saying that the patient had acute lacunar infarct.  MRI 

done to confirm this showed that this is not an acute infarctions old lacunar 

infarct.  There is no ventricular arrhythmia seen on the monitor.  There is no 

bleeding on Xarelto.





PHYSICAL EXAMINATION: The patient is morbidly obese.  At present no acute 

distress.


                                                                Selected Entries











  12/30/19 12/30/19





  18:00 18:33


 


Pulse Rate 110 H 


 


Heart Rate (  93





Monitors)  


 


Respiratory 23 H 22 H





Rate  


 


Blood Pressure  97/61 L


 


Blood Pressure 111/85 





[Left Upper Arm  





]  


 


Blood Pressure  73





Mean  


 


Blood Pressure 93 





Mean [Left  





Upper Arm]  


 


Blood Pressure Supine 





Position [Left  





Upper Arm]  


 


O2 Sat by Pulse 100 97





Oximetry  


 


Oxygen Delivery Nasal Cannula 





Method (  





includes room  





air)  














HEAD: Is atraumatic.  Normocephalic.  EYES: Pupils are equal round regular 

reactive to light and accommodation.  Extraocular movements are normal there is 

no conjunctival pallor.  There is no scleral icterus.  EARS: Tympanic membranes 

are intact.  External auditory canals are clear.  NOSE: There is no deviated 

nasal septum.  There is no inflammation of the nasal mucous membrane.  MOUTH: 

There is no ulcers in the mouth.  Mucous membranes of the mouth are moist.  

THROAT: There is no redness of the oropharynx.  There is no exudates.  SKIN: 

There is no skin rashes.  There is no petechia or ecchymosis.  NECK: Is supple. 

There is mild JVD present.  Carotids are equal there is no bruit.  There is no 

lymphadenopathy.  There is no goiter.  There is no accessory muscle respiration 

use.  Trachea is central.  LUNGS: Shows diminished air entry prolonged 

expiration.  There is scattered end expiratory wheezing and rhonchi.  There is 

"Velcro" [E] rales of pulmonary fibrosis bilaterally in the bases.  There is a 

few fine rales of CHF.  HEART: S1-S2 is heard.  There is no S3 gallop.  There is

 no S4 gallop.  There is systolic murmur of tricuspid regurgitation and mild 

mitral regurgitation murmur present.  There is no S3 gallop.  There is no S4 gal

lop.  There is no rub.  ABDOMEN: Is obese.  Nontender.  There is no paraspinal 

megaly.  Bowel sounds are well heard.  EXTREMITIES: Femorals are deep.  Femorals

 are diminished there is no femoral bruits.  Leg pulses are diminished.  There 

is 1+ bilateral edema.  There is no DVT or cellulitis.  There is no cyanosis or 

clubbing.  There is no calf tenderness.  CNS: The patient is conscious awake 

alert oriented x3 with no focal deficit.  PSYCHIATRIC:.  The patient judgment 

insight are intact her affect is normal.


                              Labs- All tests 24 hr











  12/30/19 12/30/19 12/30/19





  03:52 03:52 04:55


 


WBC   13.4 H 


 


RBC   4.17 


 


Hgb   13.0 


 


Hct   38.9 


 


MCV   93 


 


MCH   31.2 


 


MCHC   33.4 


 


RDW   14.9 H 


 


Plt Count   198 


 


Lymph % (Auto)   Not Reportable 


 


Mono % (Auto)   Not Reportable 


 


Eos % (Auto)   Not Reportable 


 


Baso % (Auto)   Not Reportable 


 


Absolute Neuts (auto)   Not Reportable 


 


Absolute Lymphs (auto)   Not Reportable 


 


Absolute Monos (auto)   Not Reportable 


 


Absolute Eos (auto)   Not Reportable 


 


Absolute Basos (auto)   Not Reportable 


 


Total Counted   100 


 


Seg Neutrophils %   Not Reportable 


 


Seg Neuts % (Manual)   86 H 


 


Band Neutrophils %   6 H 


 


Lymphocytes % (Manual)   7 L 


 


Monocytes % (Manual)   1 L 


 


Eosinophils % (Manual)   0 


 


Basophils % (Manual)   0 


 


Abs Neuts (Manual)   12.3 H 


 


Abs Lymphs (Manual)   0.9 


 


Abs Monocytes (Manual)   0.1 


 


Absolute Eos (Manual)   0.0 


 


Abs Basophils (Manual)   0.0 


 


Platelet Comment   ADEQUATE 


 


RBC Morph Comment   NORMO-CYTIC/CHROMIC 


 


Carbonic Acid    1.89 H


 


HCO3/H2CO3 Ratio    17:1


 


ABG pH    7.34 L


 


ABG pCO2    62.7 H


 


ABG pO2    83.9


 


ABG HCO3    33.3 H


 


ABG Total CO2    35.2 H


 


ABG O2 Saturation    95.5


 


ABG Base Excess    5.7


 


FiO2    3L


 


Sodium  137.4  


 


Potassium  4.4  


 


Chloride  93 L  


 


Carbon Dioxide  35 H  


 


Anion Gap  9  


 


BUN  30 H  


 


Creatinine  0.89  


 


Est GFR ( Amer)  > 60  


 


Est GFR (MDRD) Non-Af  > 60  


 


Glucose  123 H  


 


POC Glucose   


 


Calcium  8.6  


 


Digoxin  0.91  














  12/30/19 12/30/19 12/30/19





  08:55 11:44 18:29


 


WBC   


 


RBC   


 


Hgb   


 


Hct   


 


MCV   


 


MCH   


 


MCHC   


 


RDW   


 


Plt Count   


 


Lymph % (Auto)   


 


Mono % (Auto)   


 


Eos % (Auto)   


 


Baso % (Auto)   


 


Absolute Neuts (auto)   


 


Absolute Lymphs (auto)   


 


Absolute Monos (auto)   


 


Absolute Eos (auto)   


 


Absolute Basos (auto)   


 


Total Counted   


 


Seg Neutrophils %   


 


Seg Neuts % (Manual)   


 


Band Neutrophils %   


 


Lymphocytes % (Manual)   


 


Monocytes % (Manual)   


 


Eosinophils % (Manual)   


 


Basophils % (Manual)   


 


Abs Neuts (Manual)   


 


Abs Lymphs (Manual)   


 


Abs Monocytes (Manual)   


 


Absolute Eos (Manual)   


 


Abs Basophils (Manual)   


 


Platelet Comment   


 


RBC Morph Comment   


 


Carbonic Acid   


 


HCO3/H2CO3 Ratio   


 


ABG pH   


 


ABG pCO2   


 


ABG pO2   


 


ABG HCO3   


 


ABG Total CO2   


 


ABG O2 Saturation   


 


ABG Base Excess   


 


FiO2   


 


Sodium   


 


Potassium   


 


Chloride   


 


Carbon Dioxide   


 


Anion Gap   


 


BUN   


 


Creatinine   


 


Est GFR ( Amer)   


 


Est GFR (MDRD) Non-Af   


 


Glucose   


 


POC Glucose  147 H  170 H  169 H


 


Calcium   


 


Digoxin   














  12/30/19





  21:50


 


WBC 


 


RBC 


 


Hgb 


 


Hct 


 


MCV 


 


MCH 


 


MCHC 


 


RDW 


 


Plt Count 


 


Lymph % (Auto) 


 


Mono % (Auto) 


 


Eos % (Auto) 


 


Baso % (Auto) 


 


Absolute Neuts (auto) 


 


Absolute Lymphs (auto) 


 


Absolute Monos (auto) 


 


Absolute Eos (auto) 


 


Absolute Basos (auto) 


 


Total Counted 


 


Seg Neutrophils % 


 


Seg Neuts % (Manual) 


 


Band Neutrophils % 


 


Lymphocytes % (Manual) 


 


Monocytes % (Manual) 


 


Eosinophils % (Manual) 


 


Basophils % (Manual) 


 


Abs Neuts (Manual) 


 


Abs Lymphs (Manual) 


 


Abs Monocytes (Manual) 


 


Absolute Eos (Manual) 


 


Abs Basophils (Manual) 


 


Platelet Comment 


 


RBC Morph Comment 


 


Carbonic Acid 


 


HCO3/H2CO3 Ratio 


 


ABG pH 


 


ABG pCO2 


 


ABG pO2 


 


ABG HCO3 


 


ABG Total CO2 


 


ABG O2 Saturation 


 


ABG Base Excess 


 


FiO2 


 


Sodium 


 


Potassium 


 


Chloride 


 


Carbon Dioxide 


 


Anion Gap 


 


BUN 


 


Creatinine 


 


Est GFR ( Amer) 


 


Est GFR (MDRD) Non-Af 


 


Glucose 


 


POC Glucose  189 H


 


Calcium 


 


Digoxin 








                                        





Chest X-Ray  12/25/19 19:52


IMPRESSION:


 


Equivocal right basilar airspace opacity. There is elevation


right hemidiaphragm. Cardiac megaly


 


 


copyright 2011 [employer]- All Rights Reserved


 








Chest X-Ray  12/29/19 00:00


IMPRESSION:  Chronic lung changes, stable appearance.


 








Head CT  12/29/19 00:00


IMPRESSION:  No hemorrhage.  9 mm ovoid hyperdensity in the left cerebellar 

hemisphere suggesting lacunar infarction, possibly acute.


EVIDENCE OF ACUTE STROKE: YES.  LEFT VERTEBROBASILAR


 








Brain MRI with MRA  12/30/19 00:00


IMPRESSION:  NORMAL MRA OF THE [place].


 








Carotid Doppler Study  12/30/19 00:00


IMPRESSION:  NO HEMODYNAMICALLY SIGNIFICANT STENOSIS.


 








Chest X-Ray  12/30/19 00:00


IMPRESSION:  STABLE CHRONIC CHANGES.  NO ACUTE RADIOGRAPHIC FINDING IN THE 

CHEST.


 








Head MRI  12/30/19 00:00


IMPRESSION:  OLD LACUNAR INFARCT IN THE LEFT CEREBELLAR HEMISPHERE.  NO OTHER 

SIGNIFICANT OR ACUTE FINDINGS.


EVIDENCE OF ACUTE STROKE: NO.


 














pression/RECOMMENDATION:





1.  Acute lacunar infarct by CT scan of the head.  But MRI shows that there is 

old lacunar infarct.  The patient has no signs or symptoms of a CVA.  


2.Acute on chronic respiratory failure.  Continue supplemental oxygen, and 

current anti-COPD medication.  This is improved.


3.  Acute infective bronchitis/possible right lower lobe pneumonia: Continue 

current treatment including antibiotics.


4.  Intermittent altered sensorium with somnolence since most likely secondary 

to hypercapnia, but would also recommend getting the patient a CT of the head to

 make sure there is no acute intracranial pathology. 


5.  Most likely acute on chronic systolic heart failure: Continue diuretics, in 

view of the patient's current current wheezing will not start the patient on 

Toprol-XL at present.  We will start the patient on Entresto.  If tachycardia 

persists will give 1 dose of digoxin.  The patient still has some wheezing and 

hence we will start not start Toprol-XL will try from tomorrow.


6.  Pulmonary fibrosis.?  Right lower lobe pneumonia.  We will repeat the 

patient's chest x-ray in a.m.


7.  History of asthma and COPD.


8.  Hypertension: Continue current medication.  Increase Entresto as tolerated. 

 Later would add a beta-blocker.


9. Atrial flutter.  There is also right bundle branch block pattern and left 

anterior fascicular block.  [Bifascicular block].  This raises the possibility 

of post tachycardia induced cardiomyopathy.  Will increase the patient's Toprol-

XL to 50 mg p.o. every 12 hours.  In view of the cardiomyopathy we will continue

patient on Entresto.  In view of the patient's heart rate still being up we will

start the patient on IV Cardizem infusion at 7.5 mg/h.  Also her old records 

from [place] were reviewed.  There is one EKG done which is suspicious for

atrial flutter although the official reading is sinus tachycardia.  But I 

strongly believe that that EKG is atrial flutter, and hence the patient has had 

longstanding atrial fibrillation or at least intermittent paroxysmal atrial 

fibrillation since 2017, the data of that prior EKG from [place].


10.  Diabetes mellitus type 2 non-insulin-dependent.


11.  Hypothyroidism:: Continue thyroid replacement.  Note the patient's repeat 

TSH is normal, and free T3 and free T4 are normal...  


12 Moderate to severe tricuspid regurgitation.  At least moderate pulmonary 

hypertension with right ventricle systolic pressure of 53 to 58 mmHg.


13.  History of pulmonary embolism: Patient states she is on Xarelto.  Will 

continue Xarelto.  


14.  History of breast cancer status post bilateral mastectomies and 

chemotherapy and radiation treatment.


15.  History of multiple abdominal surgeries.


16.  Morbid obesity.





Occasions reviewed.  Medical regimen and management plan discussed with the 

intensivist.  Medical decision making is of high complexity.  40 minutes spent 

with patient more than 50% of time spent in direct patient care.  Will follow.

## 2019-12-30 NOTE — RADIOLOGY REPORT (SQ)
EXAM DESCRIPTION:  CHEST SINGLE VIEW



COMPLETED DATE/TIME:  12/30/2019 11:23 am



REASON FOR STUDY:  acute respiratory failure



COMPARISON:  Chest x-ray dated 12/29/2019.  Chest CT dated 2/6/2019.



EXAM PARAMETERS:  NUMBER OF VIEWS: One view.

TECHNIQUE: Single frontal radiographic view of the chest acquired.

RADIATION DOSE: NA

LIMITATIONS: None.



FINDINGS:  LUNGS AND PLEURA: Persist elevation of the right hemidiaphragm.  Chronic interstitial cabrales
ges.  No focal infiltrates, masses or pneumothorax. No pleural effusion.

MEDIASTINUM AND HILAR STRUCTURES: No masses.  Contour normal.

HEART AND VASCULAR STRUCTURES: Heart normal in size.  Normal vasculature.

BONES: No acute findings.

HARDWARE: None in the chest.

OTHER: No other significant finding.



IMPRESSION:  STABLE CHRONIC CHANGES.  NO ACUTE RADIOGRAPHIC FINDING IN THE CHEST.



TECHNICAL DOCUMENTATION:  JOB ID:  [id number]

 2011 [employer]- All Rights Reserved



Reading location - IP/workstation name: [email or web address]

## 2019-12-31 LAB
ABSOLUTE LYMPHOCYTES# (MANUAL): 0.7 10^3/UL (ref 0.5–4.7)
ABSOLUTE MONOCYTES # (MANUAL): 0.1 10^3/UL (ref 0.1–1.4)
ADD MANUAL DIFF: YES
ANION GAP SERPL CALC-SCNC: 5 MMOL/L (ref 5–19)
ANISOCYTOSIS BLD QL SMEAR: SLIGHT
BASOPHILS NFR BLD MANUAL: 0 % (ref 0–2)
BUN SERPL-MCNC: 37 MG/DL (ref 7–20)
CALCIUM: 8.3 MG/DL (ref 8.4–10.2)
CHLORIDE SERPL-SCNC: 94 MMOL/L (ref 98–107)
CO2 SERPL-SCNC: 39 MMOL/L (ref 22–30)
DIGOXIN SERPL-MCNC: 0.59 NG/ML (ref 0.8–2)
EOSINOPHIL NFR BLD MANUAL: 0 % (ref 0–6)
ERYTHROCYTE [DISTWIDTH] IN BLOOD BY AUTOMATED COUNT: 14.6 % (ref 11.5–14)
GLUCOSE SERPL-MCNC: 183 MG/DL (ref 75–110)
HCT VFR BLD CALC: 35.1 % (ref 36–47)
HGB BLD-MCNC: 11.9 G/DL (ref 12–15.5)
MCH RBC QN AUTO: 31.7 PG (ref 27–33.4)
MCHC RBC AUTO-ENTMCNC: 33.9 G/DL (ref 32–36)
MCV RBC AUTO: 94 FL (ref 80–97)
MONOCYTES % (MANUAL): 1 % (ref 3–13)
NEUTS BAND NFR BLD MANUAL: 1 % (ref 3–5)
OVALOCYTES BLD QL SMEAR: SLIGHT
PLATELET # BLD: 199 10^3/UL (ref 150–450)
PLATELET COMMENT: ADEQUATE
POTASSIUM SERPL-SCNC: 4.1 MMOL/L (ref 3.6–5)
RBC # BLD AUTO: 3.74 10^6/UL (ref 3.72–5.28)
SEGMENTED NEUTROPHILS % (MAN): 92 % (ref 42–78)
TOTAL CELLS COUNTED BLD: 100
VARIANT LYMPHS NFR BLD MANUAL: 6 % (ref 13–45)
WBC # BLD AUTO: 11.5 10^3/UL (ref 4–10.5)

## 2019-12-31 RX ADMIN — HYDROCODONE BITARTRATE AND ACETAMINOPHEN PRN TAB: 5; 325 TABLET ORAL at 17:17

## 2019-12-31 RX ADMIN — METHYLPREDNISOLONE SODIUM SUCCINATE SCH MG: 40 INJECTION, POWDER, FOR SOLUTION INTRAMUSCULAR; INTRAVENOUS at 06:00

## 2019-12-31 RX ADMIN — PREGABALIN SCH MG: 100 CAPSULE ORAL at 06:00

## 2019-12-31 RX ADMIN — METOPROLOL SUCCINATE SCH MG: 50 TABLET, EXTENDED RELEASE ORAL at 22:09

## 2019-12-31 RX ADMIN — CEFEPIME SCH MLS/HR: 2 INJECTION, POWDER, FOR SOLUTION INTRAMUSCULAR; INTRAVENOUS at 22:02

## 2019-12-31 RX ADMIN — IPRATROPIUM BROMIDE SCH MG: 0.5 SOLUTION RESPIRATORY (INHALATION) at 17:05

## 2019-12-31 RX ADMIN — Medication SCH: at 05:40

## 2019-12-31 RX ADMIN — Medication PRN MLS/HR: at 19:00

## 2019-12-31 RX ADMIN — CEFEPIME SCH MLS/HR: 2 INJECTION, POWDER, FOR SOLUTION INTRAMUSCULAR; INTRAVENOUS at 14:47

## 2019-12-31 RX ADMIN — DOCUSATE SODIUM SCH MG: 100 CAPSULE, LIQUID FILLED ORAL at 09:23

## 2019-12-31 RX ADMIN — SACUBITRIL AND VALSARTAN SCH TAB: 24; 26 TABLET, FILM COATED ORAL at 17:08

## 2019-12-31 RX ADMIN — DOCUSATE SODIUM SCH MG: 100 CAPSULE, LIQUID FILLED ORAL at 17:08

## 2019-12-31 RX ADMIN — METOPROLOL SUCCINATE SCH MG: 50 TABLET, EXTENDED RELEASE ORAL at 10:51

## 2019-12-31 RX ADMIN — IPRATROPIUM BROMIDE SCH MG: 0.5 SOLUTION RESPIRATORY (INHALATION) at 08:13

## 2019-12-31 RX ADMIN — INSULIN HUMAN SCH: 100 INJECTION, SOLUTION PARENTERAL at 08:06

## 2019-12-31 RX ADMIN — LEVOTHYROXINE SODIUM SCH MG: 50 TABLET ORAL at 06:00

## 2019-12-31 RX ADMIN — FAMOTIDINE SCH MG: 20 TABLET, FILM COATED ORAL at 09:23

## 2019-12-31 RX ADMIN — VANCOMYCIN HYDROCHLORIDE SCH MLS/HR: 1 INJECTION, POWDER, LYOPHILIZED, FOR SOLUTION INTRAVENOUS at 10:51

## 2019-12-31 RX ADMIN — Medication SCH ML: at 14:47

## 2019-12-31 RX ADMIN — SACUBITRIL AND VALSARTAN SCH TAB: 24; 26 TABLET, FILM COATED ORAL at 09:23

## 2019-12-31 RX ADMIN — INSULIN HUMAN SCH UNIT: 100 INJECTION, SOLUTION PARENTERAL at 11:16

## 2019-12-31 RX ADMIN — BUDESONIDE SCH MG: 0.5 SUSPENSION RESPIRATORY (INHALATION) at 08:13

## 2019-12-31 RX ADMIN — BUDESONIDE SCH MG: 0.5 SUSPENSION RESPIRATORY (INHALATION) at 20:47

## 2019-12-31 RX ADMIN — PREGABALIN SCH MG: 100 CAPSULE ORAL at 22:00

## 2019-12-31 RX ADMIN — CEFEPIME SCH MLS/HR: 2 INJECTION, POWDER, FOR SOLUTION INTRAMUSCULAR; INTRAVENOUS at 06:01

## 2019-12-31 RX ADMIN — INSULIN HUMAN SCH UNIT: 100 INJECTION, SOLUTION PARENTERAL at 17:08

## 2019-12-31 RX ADMIN — INSULIN HUMAN SCH UNIT: 100 INJECTION, SOLUTION PARENTERAL at 22:01

## 2019-12-31 RX ADMIN — FAMOTIDINE SCH MG: 20 TABLET, FILM COATED ORAL at 22:00

## 2019-12-31 RX ADMIN — METOPROLOL SUCCINATE SCH: 50 TABLET, EXTENDED RELEASE ORAL at 11:04

## 2019-12-31 RX ADMIN — METHYLPREDNISOLONE SODIUM SUCCINATE SCH MG: 40 INJECTION, POWDER, FOR SOLUTION INTRAMUSCULAR; INTRAVENOUS at 22:01

## 2019-12-31 RX ADMIN — METHYLPREDNISOLONE SODIUM SUCCINATE SCH MG: 40 INJECTION, POWDER, FOR SOLUTION INTRAMUSCULAR; INTRAVENOUS at 14:47

## 2019-12-31 RX ADMIN — Medication SCH: at 22:13

## 2019-12-31 RX ADMIN — RIVAROXABAN SCH MG: 10 TABLET, FILM COATED ORAL at 17:08

## 2019-12-31 RX ADMIN — PREGABALIN SCH MG: 100 CAPSULE ORAL at 14:46

## 2019-12-31 NOTE — RADIOLOGY REPORT (SQ)
EXAM DESCRIPTION:  ELIZABETH SWALLOW



COMPLETED DATE/TIME:  12/31/2019 8:46 am



REASON FOR STUDY:  Signs/symptoms of aspiration



COMPARISON:  None.



TECHNIQUE:  Videofluoroscopic swallowing examination was performed in conjunction with speech patholo
gy.  Videofluoroscopic imaging was obtained and reviewed and these are the findings:



RADIATION DOSE:  Fluoro time 1.25 minutes

2 images saved to PACS.



LIMITATIONS:  None



FINDINGS:  The patient was brought into the fluoro room and placed upright on a modified barium swall
ow chair.  The patient was then given multiple consistencies mixed with barium to swallow under live 
fluoroscopic video guidance.  According to the Speech Pathologist there was no penetration or aspirat
ion. Please refer to the speech pathology report for further details.



IMPRESSION:  NO EVIDENCE OF PENETRATION OR ASPIRATION. PLEASE SEE SPEECH PATHOLOGIST REPORT FOR OTHER
 FINDINGS AND RECOMMENDATIONS.



COMMENT:  None

Quality :  Final reports for procedures using fluoroscopy that document radiation exposure oliver
luis, or exposure time and number of fluorographic images (if radiation exposure indices are not avail
able)



TECHNICAL DOCUMENTATION:  JOB ID: [id number]

 2011 [employer]- All Rights Reserved



Reading location - IP/workstation name: [email or web address]

## 2019-12-31 NOTE — PROGRESS NOTE
Provider Note


Provider Note: 


Time spent at bedside to educate pt and son as to why she was made NPO and 

answered all questions regarding barium swallow test that was ordered for pt. Pt

states she was not aware of the test but states she has had some coughing 

recently. Understands the need for being NPO and agrees to the test. All 

questions answered for both pt and son at bedside.

## 2019-12-31 NOTE — PDOC CRITICAL CARE PROG REPORT
General


Date:: 12/31/19 - Critical Care Attending Note


Resuscitation Status: Full Code


Events in the past 12 to 24 Hours:: 





Pt passed her MBS. Remains on cardizem drip.


Reason for ICU Addmission:: acute on chronic respiratory failure, altered mental

status





Physical Exam


Vital Signs: 


                                        











Temp Pulse Resp BP Pulse Ox


 


 98.0 F   89   21 H  109/70   97 


 


 12/31/19 12:00  12/31/19 12:00  12/31/19 12:00  12/31/19 12:00  12/31/19 12:00








                                 Intake & Output











 12/30/19 12/31/19 01/01/20





 06:59 06:59 06:59


 


Intake Total 140 570 200


 


Output Total 1200 1550 300


 


Balance -1060 -980 -100


 


Weight 101.5 kg 102 kg 








                                  Weight/Height





Weight                           102 kg


Height                           5 ft








General appearance: PRESENT: no acute distress, well-developed, well-nourished


Head exam: PRESENT: atraumatic, normocephalic


Respiratory exam: PRESENT: clear to auscultation renay, unlabored


Cardiovascular exam: PRESENT: irregular rhythm


GI/Abdominal exam: PRESENT: soft


Extremities exam: PRESENT: other - no edema


Neurological exam: PRESENT: alert, awake, CN II-XII grossly intact





Laboratory/Radiographs


Laboratory Results: 


                                        





                                 12/31/19 04:11 





                                 12/31/19 04:11 





                                        











  12/31/19 12/31/19





  04:11 04:11


 


WBC   11.5 H


 


RBC   3.74


 


Hgb   11.9 L


 


Hct   35.1 L


 


MCV   94


 


MCH   31.7


 


MCHC   33.9


 


RDW   14.6 H


 


Plt Count   199


 


Seg Neutrophils %   Not Reportable


 


Sodium  138.0 


 


Potassium  4.1 


 


Chloride  94 L 


 


Carbon Dioxide  39 H 


 


Anion Gap  5 


 


BUN  37 H 


 


Creatinine  0.88 


 


Est GFR ([place] Amer)  > 60 


 


Glucose  183 H 


 


Calcium  8.3 L 








                                        





12/25/19 23:14   Blood   Blood Culture - Final


                            NO GROWTH IN 5 DAYS


12/25/19 21:58   Blood   Blood Culture - Final


                            NO GROWTH IN 5 DAYS





                                        











  12/25/19 12/25/19 12/25/19





  20:45 20:45 23:14


 


Creatine Kinase  46  


 


Troponin I   0.025  0.027


 


NT-Pro-B Natriuret Pep   2580 H 














  12/26/19 12/26/19 12/29/19





  05:05 11:08 07:58


 


Creatine Kinase   


 


Troponin I  0.027  0.024  0.015


 


NT-Pro-B Natriuret Pep   











Impressions: 


                                        





Head CT  12/29/19 00:00


IMPRESSION:  No hemorrhage.  9 mm ovoid hyperdensity in the left cerebellar 

hemisphere suggesting lacunar infarction, possibly acute.


EVIDENCE OF ACUTE STROKE: YES.  LEFT VERTEBROBASILAR


 








Brain MRI with MRA  12/30/19 00:00


IMPRESSION:  NORMAL MRA OF THE [place].


 








Carotid Doppler Study  12/30/19 00:00


IMPRESSION:  NO HEMODYNAMICALLY SIGNIFICANT STENOSIS.


 








Chest X-Ray  12/30/19 00:00


IMPRESSION:  STABLE CHRONIC CHANGES.  NO ACUTE RADIOGRAPHIC FINDING IN THE 

CHEST.


 








Head MRI  12/30/19 00:00


IMPRESSION:  OLD LACUNAR INFARCT IN THE LEFT CEREBELLAR HEMISPHERE.  NO OTHER 

SIGNIFICANT OR ACUTE FINDINGS.


EVIDENCE OF ACUTE STROKE: NO.


 








Modified Barium Swallow  12/31/19 00:00


IMPRESSION:  NO EVIDENCE OF PENETRATION OR ASPIRATION. PLEASE SEE SPEECH 

PATHOLOGIST REPORT FOR OTHER FINDINGS AND RECOMMENDATIONS.


 














Assessment and Plan





- Diagnosis


(1) Acute and chronic respiratory failure with hypoxia


Is this a current diagnosis for this admission?: Yes   





(2) Chronic obstructive pulmonary disease (COPD)


Qualifiers: 


   COPD type: unspecified COPD   Qualified Code(s): J44.9 - Chronic obstructive 

pulmonary disease, unspecified   


Is this a current diagnosis for this admission?: Yes   





Plan Summary: 





Assessment: critically ill 80 yo woman with acute on chronic respiratory 

failure, AECOPD, atrial flutter,h/o PE, acute CHF, DM, h/o pulmonary fibrosis, 

altered mental status





Plan:


1. Respiratory: acute on chronic respiratory failure, resolved. Pt is off bipap 

and on NC. Continue bipap prn


2. Pulmonary: AECOPD, h/o pulmonary fibrosis. PNA. Day 2 vanc and cefepime. 

Continue steroids, bronchodilators


3. CV: atrial flutter, acute CHF, cardiomyopathy with EF of 35%. On cardizem 

drip,toprol, entresto. Care per Dr. Ruiz. 


4. Heme: on xarelto


5. Neuro: head CT shows acute lacunar infarct. MRI done 12/31 shows that the 

lacunar infarct is old. 


6. ID: PNA. Day 2 vanc and cefepime. 


7. Endocrine: DM. Accuchecks, SSI. Hypothyroidism, synthroid


8. Nutrition: passed MBS. Cardiac diet


9. Prophylaxis: pharmacologic DVT prophlaxis not indicated b/c pt on xarelto


10. Disposition: will transfer to [facility]





Critical Time


Critical Time (minutes): 0


Level of Care: [facility]


-: 


1.  The care of a critical patient is a dynamic process.  This note is a 

representative synopsis but static in nature.  The timeframe for treatments 

given in order is not necessarily the actual time these treatments may have been

done.





2.  This patient requires critical care secondary to ongoing requirements for 

therapy not offered or safe outside the critical care environment.  Transfer to 

a lower level of care will result in altered life or limb morbidity and 

mortality.





3.  Multidisciplinary rounds completed.





4.  ABCDE bundle addressed.

## 2019-12-31 NOTE — ST INP MODIFIED BARIUM SWALLOW
Medical Diagnosis





- Medical Diagnoses


Medical Diagnosis Description & ICD-10 Code(s): Congestive heart failure





- ICD-10 Tx Diagnosis Coding


(1) Dysphagia


ICD-10 Code(s): R13.10 - DYSPHAGIA, UNSPECIFIED   





ST Inpatient [facility]





- General


Date: 12/31/19


Date of Onset: 12/25/19





- History


-: Medical - Patient is a 79 year old female presenting for modified barium 

swallow study. Per EMR, patient was admitted to the ED with shortness of breath 

and exacerbating COPD symptoms. At the bedside, overt signs and symptoms of 

aspiration were observed including wet, gurgly voice and delayed cough on single

sips thin liquid. Delayed cough also noted on puree. Given elevated white blood 

cell count and risk factors for dysphagia, including hypertension, recommended 

modified barium swallow study.


Medications: Medications Reviewed - No medications placing at risk for dysphagia





- Subjective


Current Nutritional Means: NPO


Current PO Diet: N/A (NPO)


Current Symptoms: Coughing, Wet/gurgly voice


Pain: no signs/symptoms of pain





- Objective


Assessment: Upright, Left Lateral





- Food Trials


Food Trials Used: Thin liquids, Pureed, Regular


The Patient: Was Able to Self Feed





- Assessment


Labial Function: Within Normal Limits


Lingual Function: Within Normal Limits


Mandibular Function: Within Normal Limits


Dentition: Partial, Dentures-Upper - Not wearing during study, but reports 

wearing


Velo-Pharyngeal Function: Not assessed


Laryngeal Function: Volitional Swallow





- Pharyngeal Stage


Initiation of Pharyngeal Stage: Delayed - Mild delay, initated in valleculae


Decreased Laryngeal Elevation: No


Reduced Velo-Pharyngeal Closure: yes - Only with seguential sips


Reduced Pressure Generation: No


Reduced Tongue Base Retraction: No


Pre-Swallowing Pooling in Valleculae: None


Pre-Swallowing Pooling in Pyriforms: None


Reduced Thyro-Hyiod Approximation: No


Reduced Epiglottic Excursion: No


Reduced Pharyngeal Peristalsis: No


Multiple Swallows With: Effective


Post Swallow Residuals in Valleculae: Mild


Post Swallow Residuals in Pyriforms: Mild


Pahryngeal Stage Comments: Mild oropharyngeal dysphagia observed, characterized 

by mildly delayed swallow and reduced hyolaryngeal elevation. Trace transient 

penetration of thin liquids with sequential sips, however, all material ejected 

from airway.





- Impression/Summary


Laryngeal Penetration: Flash, during swallow


Tracheal Aspiration: no


Patient Presents With: Pharyngeal stage dysph. - Mild oropharyngeal dysphagia, 

however, swallowing largely within normal limits. No aspiration.


Risk of Aspiration: Minimal


Risk of Nutritional Compromise: None





- Recommendations


Solid Diet Recommendations: Mechanical Soft - Due to dentures, Chopped Meat


Liquid Diet Recommendations: Thin


Dysphagia Therapy with SLP: No


Recommended Techniques: Fully Upright During Meal


Supervision: Independent





- Time


Total Time: 15


Total Timed Minutes: 15

## 2020-01-01 LAB
ABSOLUTE LYMPHOCYTES# (MANUAL): 0.5 10^3/UL (ref 0.5–4.7)
ABSOLUTE MONOCYTES # (MANUAL): 0 10^3/UL (ref 0.1–1.4)
ADD MANUAL DIFF: YES
ANION GAP SERPL CALC-SCNC: 6 MMOL/L (ref 5–19)
ANISOCYTOSIS BLD QL SMEAR: SLIGHT
BASOPHILS NFR BLD MANUAL: 0 % (ref 0–2)
BUN SERPL-MCNC: 44 MG/DL (ref 7–20)
CALCIUM: 8.6 MG/DL (ref 8.4–10.2)
CHLORIDE SERPL-SCNC: 96 MMOL/L (ref 98–107)
CO2 SERPL-SCNC: 36 MMOL/L (ref 22–30)
EOSINOPHIL NFR BLD MANUAL: 0 % (ref 0–6)
ERYTHROCYTE [DISTWIDTH] IN BLOOD BY AUTOMATED COUNT: 14.6 % (ref 11.5–14)
GLUCOSE SERPL-MCNC: 180 MG/DL (ref 75–110)
HCT VFR BLD CALC: 38.2 % (ref 36–47)
HGB BLD-MCNC: 13.2 G/DL (ref 12–15.5)
MCH RBC QN AUTO: 32.1 PG (ref 27–33.4)
MCHC RBC AUTO-ENTMCNC: 34.4 G/DL (ref 32–36)
MCV RBC AUTO: 93 FL (ref 80–97)
MONOCYTES % (MANUAL): 0 % (ref 3–13)
OVALOCYTES BLD QL SMEAR: SLIGHT
PLATELET # BLD: 163 10^3/UL (ref 150–450)
PLATELET COMMENT: ADEQUATE
POIKILOCYTOSIS BLD QL SMEAR: SLIGHT
POTASSIUM SERPL-SCNC: 4.3 MMOL/L (ref 3.6–5)
RBC # BLD AUTO: 4.1 10^6/UL (ref 3.72–5.28)
SCHISTOCYTES BLD QL SMEAR: SLIGHT
SEGMENTED NEUTROPHILS % (MAN): 95 % (ref 42–78)
TOTAL CELLS COUNTED BLD: 100
TOXIC GRANULES BLD QL SMEAR: SLIGHT
VARIANT LYMPHS NFR BLD MANUAL: 5 % (ref 13–45)
WBC # BLD AUTO: 10 10^3/UL (ref 4–10.5)

## 2020-01-01 RX ADMIN — IPRATROPIUM BROMIDE SCH: 0.5 SOLUTION RESPIRATORY (INHALATION) at 09:26

## 2020-01-01 RX ADMIN — Medication SCH ML: at 21:25

## 2020-01-01 RX ADMIN — HYDROCODONE BITARTRATE AND ACETAMINOPHEN PRN TAB: 5; 325 TABLET ORAL at 21:28

## 2020-01-01 RX ADMIN — IPRATROPIUM BROMIDE SCH MG: 0.5 SOLUTION RESPIRATORY (INHALATION) at 00:22

## 2020-01-01 RX ADMIN — IPRATROPIUM BROMIDE SCH MG: 0.5 SOLUTION RESPIRATORY (INHALATION) at 09:20

## 2020-01-01 RX ADMIN — Medication SCH: at 05:24

## 2020-01-01 RX ADMIN — DOCUSATE SODIUM SCH MG: 100 CAPSULE, LIQUID FILLED ORAL at 09:07

## 2020-01-01 RX ADMIN — HYDROCODONE BITARTRATE AND ACETAMINOPHEN PRN TAB: 5; 325 TABLET ORAL at 10:29

## 2020-01-01 RX ADMIN — Medication SCH ML: at 13:47

## 2020-01-01 RX ADMIN — PREDNISONE SCH MG: 20 TABLET ORAL at 17:04

## 2020-01-01 RX ADMIN — PREGABALIN SCH MG: 100 CAPSULE ORAL at 21:25

## 2020-01-01 RX ADMIN — METOPROLOL SUCCINATE SCH MG: 50 TABLET, EXTENDED RELEASE ORAL at 09:07

## 2020-01-01 RX ADMIN — PREGABALIN SCH MG: 100 CAPSULE ORAL at 05:13

## 2020-01-01 RX ADMIN — METOPROLOL SUCCINATE SCH MG: 50 TABLET, EXTENDED RELEASE ORAL at 21:25

## 2020-01-01 RX ADMIN — CEFEPIME SCH MLS/HR: 2 INJECTION, POWDER, FOR SOLUTION INTRAMUSCULAR; INTRAVENOUS at 05:19

## 2020-01-01 RX ADMIN — VANCOMYCIN HYDROCHLORIDE SCH MLS/HR: 1 INJECTION, POWDER, LYOPHILIZED, FOR SOLUTION INTRAVENOUS at 10:43

## 2020-01-01 RX ADMIN — CEFEPIME SCH MLS/HR: 2 INJECTION, POWDER, FOR SOLUTION INTRAMUSCULAR; INTRAVENOUS at 21:28

## 2020-01-01 RX ADMIN — PREGABALIN SCH MG: 100 CAPSULE ORAL at 13:47

## 2020-01-01 RX ADMIN — INSULIN HUMAN SCH UNIT: 100 INJECTION, SOLUTION PARENTERAL at 08:00

## 2020-01-01 RX ADMIN — INSULIN HUMAN SCH UNIT: 100 INJECTION, SOLUTION PARENTERAL at 21:24

## 2020-01-01 RX ADMIN — INSULIN HUMAN SCH UNIT: 100 INJECTION, SOLUTION PARENTERAL at 17:02

## 2020-01-01 RX ADMIN — SACUBITRIL AND VALSARTAN SCH TAB: 24; 26 TABLET, FILM COATED ORAL at 17:02

## 2020-01-01 RX ADMIN — METHYLPREDNISOLONE SODIUM SUCCINATE SCH MG: 40 INJECTION, POWDER, FOR SOLUTION INTRAMUSCULAR; INTRAVENOUS at 05:15

## 2020-01-01 RX ADMIN — INSULIN HUMAN SCH UNIT: 100 INJECTION, SOLUTION PARENTERAL at 12:38

## 2020-01-01 RX ADMIN — SACUBITRIL AND VALSARTAN SCH TAB: 24; 26 TABLET, FILM COATED ORAL at 09:07

## 2020-01-01 RX ADMIN — BUDESONIDE SCH MG: 0.5 SUSPENSION RESPIRATORY (INHALATION) at 20:55

## 2020-01-01 RX ADMIN — RIVAROXABAN SCH MG: 10 TABLET, FILM COATED ORAL at 17:02

## 2020-01-01 RX ADMIN — BUDESONIDE SCH MG: 0.5 SUSPENSION RESPIRATORY (INHALATION) at 09:20

## 2020-01-01 RX ADMIN — FAMOTIDINE SCH MG: 20 TABLET, FILM COATED ORAL at 09:07

## 2020-01-01 RX ADMIN — LEVOTHYROXINE SODIUM SCH MG: 50 TABLET ORAL at 05:13

## 2020-01-01 RX ADMIN — DOCUSATE SODIUM SCH MG: 100 CAPSULE, LIQUID FILLED ORAL at 17:02

## 2020-01-01 RX ADMIN — HYDROCODONE BITARTRATE AND ACETAMINOPHEN PRN TAB: 5; 325 TABLET ORAL at 03:46

## 2020-01-01 RX ADMIN — BUDESONIDE SCH: 0.5 SUSPENSION RESPIRATORY (INHALATION) at 09:27

## 2020-01-01 RX ADMIN — FAMOTIDINE SCH MG: 20 TABLET, FILM COATED ORAL at 21:25

## 2020-01-01 RX ADMIN — CEFEPIME SCH MLS/HR: 2 INJECTION, POWDER, FOR SOLUTION INTRAMUSCULAR; INTRAVENOUS at 13:46

## 2020-01-01 RX ADMIN — METHYLPREDNISOLONE SODIUM SUCCINATE SCH MG: 40 INJECTION, POWDER, FOR SOLUTION INTRAMUSCULAR; INTRAVENOUS at 13:47

## 2020-01-01 NOTE — EKG REPORT
SEVERITY:- ABNORMAL ECG -

ATRIAL FLUTTER, A-RATE 254

RBBB AND LAFB

PROBABLE LVH WITH SECONDARY REPOL ABNRM

:

Confirmed by: Chaitanya Mendez MD 01-Jan-2020 14:37:22

## 2020-01-01 NOTE — PDOC PROGRESS REPORT
Subjective


Progress Note for:: 01/01/20


Subjective:: 


This is a 79-year-old female with chronic respiratory failure, COPD, history of 

PE, congestive heart failure and history of pulmonary fibrosis initially 

presented with increasing shortness of breath.  She was admitted for acute and 

chronic asthma hypoxic respiratory failure multifactorial from CHF exacerbation,

right lobe pneumonia and COPD exacerbation.





On the floor she developed atrial fibrillation with RVR.  She reportedly had 

confusion and there was initial concern for possible CVA.  She was subsequently 

transferred to the ICU.  She was seen by cardiology.  She was also started on 

Cardizem drip.  She was downgraded to the floor yesterday 12/31.





No acute event overnight.  She was weaned off Cardizem drip overnight.  Upon 

encounter, she is currently saturating well and comfortable on BiPAP.  She 

denies chest pain.  She says her shortness of breath continue to improve.  Heart

rate running in the 110s-120s.





Reason For Visit: 


ACUTE ON CHRONIC DISTOLIC CONGESTIVE HEART FAILURE








Physical Exam


Vital Signs: 


                                        











Temp Pulse Resp BP Pulse Ox


 


 97.2 F   127 H  20   117/84   99 


 


 01/01/20 12:06  01/01/20 12:06  01/01/20 12:06  01/01/20 12:06  01/01/20 12:06








                                 Intake & Output











 12/31/19 01/01/20 01/02/20





 06:59 06:59 06:59


 


Intake Total 570 638 625


 


Output Total 1550 950 400


 


Balance -980 -312 225


 


Weight 224 lb 13.944 oz 182 lb 15.739 oz 











General appearance: PRESENT: no acute distress, well-developed, well-nourished


Head exam: PRESENT: atraumatic, normocephalic


Eye exam: PRESENT: conjunctiva pink, EOMI, PERRLA.  ABSENT: scleral icterus


Ear exam: PRESENT: normal external ear exam


Mouth exam: PRESENT: moist, tongue midline


Neck exam: ABSENT: carotid bruit, JVD, lymphadenopathy, thyromegaly


Respiratory exam: PRESENT: rhonchi.  ABSENT: rales, wheezes


Cardiovascular exam: PRESENT: irregular rhythm, tachycardia


Pulses: PRESENT: normal dorsalis pedis pul


GI/Abdominal exam: PRESENT: normal bowel sounds, soft.  ABSENT: distended, 

guarding, mass, organolmegaly, rebound, tenderness


Rectal exam: PRESENT: deferred


Neurological exam: PRESENT: alert, awake, oriented to person, oriented to place,

oriented to time, oriented to situation, CN II-XII grossly intact.  ABSENT: 

motor sensory deficit





Results


Laboratory Results: 


                                        





                                 01/01/20 05:46 





                                 01/01/20 05:46 





                                        











  01/01/20 01/01/20





  05:46 05:46


 


WBC  10.0 


 


RBC  4.10 


 


Hgb  13.2 


 


Hct  38.2 


 


MCV  93 


 


MCH  32.1 


 


MCHC  34.4 


 


RDW  14.6 H 


 


Plt Count  163 


 


Seg Neutrophils %  Not Reportable 


 


Sodium   138.0


 


Potassium   4.3


 


Chloride   96 L


 


Carbon Dioxide   36 H


 


Anion Gap   6


 


BUN   44 H


 


Creatinine   0.79


 


Est GFR (African Amer)   > 60


 


Glucose   180 H


 


Calcium   8.6








                                        











  12/25/19 12/25/19 12/25/19





  20:45 20:45 23:14


 


Creatine Kinase  46  


 


Troponin I   0.025  0.027


 


NT-Pro-B Natriuret Pep   2580 H 














  12/26/19 12/26/19 12/29/19





  05:05 11:08 07:58


 


Creatine Kinase   


 


Troponin I  0.027  0.024  0.015


 


NT-Pro-B Natriuret Pep   











Impressions: 


                                        





Head CT  12/29/19 00:00


IMPRESSION:  No hemorrhage.  9 mm ovoid hyperdensity in the left cerebellar 

hemisphere suggesting lacunar infarction, possibly acute.


EVIDENCE OF ACUTE STROKE: YES.  LEFT VERTEBROBASILAR


 








Brain MRI with MRA  12/30/19 00:00


IMPRESSION:  NORMAL MRA OF THE [place] OF [place].


 








Carotid Doppler Study  12/30/19 00:00


IMPRESSION:  NO HEMODYNAMICALLY SIGNIFICANT STENOSIS.


 








Chest X-Ray  12/30/19 00:00


IMPRESSION:  STABLE CHRONIC CHANGES.  NO ACUTE RADIOGRAPHIC FINDING IN THE 

CHEST.


 








Head MRI  12/30/19 00:00


IMPRESSION:  OLD LACUNAR INFARCT IN THE LEFT CEREBELLAR HEMISPHERE.  NO OTHER 

SIGNIFICANT OR ACUTE FINDINGS.


EVIDENCE OF ACUTE STROKE: NO.


 








Modified Barium Swallow  12/31/19 00:00


IMPRESSION:  NO EVIDENCE OF PENETRATION OR ASPIRATION. PLEASE SEE SPEECH P

ATHOLOGIST REPORT FOR OTHER FINDINGS AND RECOMMENDATIONS.


 














Assessment and Plan





- Diagnosis


(1) Acute and chronic respiratory failure with hypoxia


Is this a current diagnosis for this admission?: Yes   


Plan: 


Multifactorial from COPD exacerbation, CHF exacerbation and pneumonia.








(2) Atrial fibrillation


Is this a current diagnosis for this admission?: Yes   


Plan: 


Wean off Cardizem drip overnight.  Discussed with cardiology.  Increase 

metoprolol to 100 mg every 12.  Tolerating Xarelto well.








(3) Acute on chronic diastolic congestive heart failure


Is this a current diagnosis for this admission?: Yes   


Plan: 


Echocardiogram shows ejection fraction 30-35%.  On Entresto.  Continue 

metoprolol.








(4) Pneumonia


Is this a current diagnosis for this admission?: Yes   


Plan: 


Continue IV antibiotics.  Plan to switch to p.o. levofloxacin in the next 24 to 

48 hours.








(5) Chronic obstructive pulmonary disease (COPD)


Qualifiers: 


   COPD type: unspecified COPD   Qualified Code(s): J44.9 - Chronic obstructive 

pulmonary disease, unspecified   


Is this a current diagnosis for this admission?: Yes   


Plan: 


Keep breathing treatments as needed due to tachycardia/Afib.  Switch IV Solu-M

edrol to prednisone.








- Plan Summary


Summary: 


Patient is admitted to a telemetry bed where she will receive usual supportive 

and symptomatic cares.  She will be treated with IV Bumex 1 mg every 6 hours to 

help resolve her acute congestive heart failure.  She will also receive 

supplemental oxygen utilizing nasal cannula and or noninvasive airway pressure 

support devices such as CPAP or BiPAP to obtain an adequate oxygen saturation 

and avoid hypercapnia.  She will receive morphine sulfate 2 mg IV every 1 hour 

PRN severe dyspnea.  She will receive a pulmonary toilet utilizing Atrovent and 

Pulmicort and will be maintained on antibiotic therapy for possible 

pneumonia/pneumonitis with Levaquin 750 mg p.o. daily x4 days.  Patient will be 

continued on her usual home medications as soon as her medication reconciliation

has been verified and completed.  A nutritionist consultation will be obtained 

to  the patient on her obesity and possible diet interventions.  Patient 

will be continued on her cardiac and diabetic restricted diet.  Before meals and

at bedtime Accu-Cheks will be obtained with sliding scale insulin used for 

control of hyperglycemia and a hypoglycemic protocol in place.





- Time


Time Spent with patient: 25-34 minutes

## 2020-01-01 NOTE — PROGRESS NOTE
Provider Note


Provider Note: 





CARDIOLOGY PROGRESS NOTE by Dr. Nuvia Abdul on 1/1/2020.





SUBJECTIVE: The patient denies any chest pain.  She has no shortness of breath. 

There is no PND orthopnea.  She continues to be in atrial flutter.  The 

ventricular response is around 91 to 110 bpm.  She states she is still coughing 

but is unable to produce any mucus.  She has no wheezing today.  There is no 

bleeding on Xarelto.  There is no TIA CVA symptoms.  There is no ventricle 

arrhythmia seen on the monitor.





PHYSICAL EXAMINATION: The patient is morbidly obese.  In no acute distress.


                                                                Selected Entries











  01/01/20 01/01/20





  15:32 16:00


 


Temperature 98.1 F 


 


Temperature Oral 





Source  


 


Pulse Rate 110 H 


 


Heart Rate (  91





Monitors)  


 


Respiratory 20 





Rate  


 


Blood Pressure 91 





Mean  


 


BP Location Left Arm 


 


O2 Sat by Pulse 94 





Oximetry  


 


Oxygen Flow 3.00 





Rate  


 


Oxygen Delivery Nasal Cannula 





Method  





HEAD: Is atraumatic.  Normocephalic.  EYES: Pupils are equal round regular 

reactive to light and accommodation.  Extraocular movements are normal there is 

no conjunctival pallor.  There is no scleral icterus.  EARS: Tympanic membranes 

are intact.  External auditory canals are clear.  NOSE: There is no deviated 

nasal septum.  There is no inflammation of the nasal mucous membrane.  MOUTH: 

There is no ulcers in the mouth.  Mucous membranes of the mouth are moist.  

THROAT: There is no redness of the oropharynx.  There is no exudates.  SKIN: 

There is no skin rashes.  There is no petechia or ecchymosis.  NECK: Is supple. 

There is mild JVD present.  Carotids are equal there is no bruit.  There is no 

lymphadenopathy.  There is no goiter.  There is no accessory muscle respiration 

use.  Trachea is central.  LUNGS: Shows diminished air entry prolonged 

expiration.  There is scattered end expiratory wheezing and rhonchi.  There is 

"Velcro" [E] rales of pulmonary fibrosis bilaterally in the bases.  There is a 

few fine rales of CHF.  HEART: S1-S2 is heard.  There is no S3 gallop.  There is

 no S4 gallop.  There is systolic murmur of tricuspid regurgitation and mild 

mitral regurgitation murmur present.  There is no S3 gallop.  There is no S4 

gallop.  There is no rub.  ABDOMEN: Is obese.  Nontender.  There is no 

paraspinal megaly.  Bowel sounds are well heard.  EXTREMITIES: Femorals are 

deep.  Femorals are diminished there is no femoral bruits.  Leg pulses are 

diminished.  There is 1+ bilateral edema.  There is no DVT or cellulitis.  There

 is no cyanosis or clubbing.  There is no calf tenderness.  CNS: The patient is 

conscious awake alert oriented x3 with no focal deficit.  PSYCHIATRIC:.  The 

patient judgment insight are intact her affect is normal.





                              Labs- All tests 24 hr











  12/31/19 12/31/19 01/01/20





  20:59 23:12 05:46


 


WBC    10.0


 


RBC    4.10


 


Hgb    13.2


 


Hct    38.2


 


MCV    93


 


MCH    32.1


 


MCHC    34.4


 


RDW    14.6 H


 


Plt Count    163


 


Lymph % (Auto)    Not Reportable


 


Mono % (Auto)    Not Reportable


 


Eos % (Auto)    Not Reportable


 


Baso % (Auto)    Not Reportable


 


Absolute Neuts (auto)    Not Reportable


 


Absolute Lymphs (auto)    Not Reportable


 


Absolute Monos (auto)    Not Reportable


 


Absolute Eos (auto)    Not Reportable


 


Absolute Basos (auto)    Not Reportable


 


Total Counted    100


 


Seg Neutrophils %    Not Reportable


 


Seg Neuts % (Manual)    95 H


 


Lymphocytes % (Manual)    5 L


 


Monocytes % (Manual)    0 L


 


Eosinophils % (Manual)    0


 


Basophils % (Manual)    0


 


Abs Neuts (Manual)    9.5 H


 


Abs Lymphs (Manual)    0.5


 


Abs Monocytes (Manual)    0.0 L


 


Absolute Eos (Manual)    0.0


 


Abs Basophils (Manual)    0.0


 


Toxic Granulation    SLIGHT


 


Platelet Comment    ADEQUATE


 


Poikilocytosis    SLIGHT


 


Anisocytosis    SLIGHT


 


Ovalocytes    SLIGHT


 


Schistocytes    SLIGHT


 


Sodium   


 


Potassium   


 


Chloride   


 


Carbon Dioxide   


 


Anion Gap   


 


BUN   


 


Creatinine   


 


Est GFR ( Amer)   


 


Est GFR (MDRD) Non-Af   


 


Glucose   


 


POC Glucose  180 H  166 H 


 


Calcium   














  01/01/20 01/01/20 01/01/20





  05:46 07:51 12:07


 


WBC   


 


RBC   


 


Hgb   


 


Hct   


 


MCV   


 


MCH   


 


MCHC   


 


RDW   


 


Plt Count   


 


Lymph % (Auto)   


 


Mono % (Auto)   


 


Eos % (Auto)   


 


Baso % (Auto)   


 


Absolute Neuts (auto)   


 


Absolute Lymphs (auto)   


 


Absolute Monos (auto)   


 


Absolute Eos (auto)   


 


Absolute Basos (auto)   


 


Total Counted   


 


Seg Neutrophils %   


 


Seg Neuts % (Manual)   


 


Lymphocytes % (Manual)   


 


Monocytes % (Manual)   


 


Eosinophils % (Manual)   


 


Basophils % (Manual)   


 


Abs Neuts (Manual)   


 


Abs Lymphs (Manual)   


 


Abs Monocytes (Manual)   


 


Absolute Eos (Manual)   


 


Abs Basophils (Manual)   


 


Toxic Granulation   


 


Platelet Comment   


 


Poikilocytosis   


 


Anisocytosis   


 


Ovalocytes   


 


Schistocytes   


 


Sodium  138.0  


 


Potassium  4.3  


 


Chloride  96 L  


 


Carbon Dioxide  36 H  


 


Anion Gap  6  


 


BUN  44 H  


 


Creatinine  0.79  


 


Est GFR ( Amer)  > 60  


 


Est GFR (MDRD) Non-Af  > 60  


 


Glucose  180 H  


 


POC Glucose   181 H  229 H


 


Calcium  8.6  














  01/01/20





  15:57


 


WBC 


 


RBC 


 


Hgb 


 


Hct 


 


MCV 


 


MCH 


 


MCHC 


 


RDW 


 


Plt Count 


 


Lymph % (Auto) 


 


Mono % (Auto) 


 


Eos % (Auto) 


 


Baso % (Auto) 


 


Absolute Neuts (auto) 


 


Absolute Lymphs (auto) 


 


Absolute Monos (auto) 


 


Absolute Eos (auto) 


 


Absolute Basos (auto) 


 


Total Counted 


 


Seg Neutrophils % 


 


Seg Neuts % (Manual) 


 


Lymphocytes % (Manual) 


 


Monocytes % (Manual) 


 


Eosinophils % (Manual) 


 


Basophils % (Manual) 


 


Abs Neuts (Manual) 


 


Abs Lymphs (Manual) 


 


Abs Monocytes (Manual) 


 


Absolute Eos (Manual) 


 


Abs Basophils (Manual) 


 


Toxic Granulation 


 


Platelet Comment 


 


Poikilocytosis 


 


Anisocytosis 


 


Ovalocytes 


 


Schistocytes 


 


Sodium 


 


Potassium 


 


Chloride 


 


Carbon Dioxide 


 


Anion Gap 


 


BUN 


 


Creatinine 


 


Est GFR ( Amer) 


 


Est GFR (MDRD) Non-Af 


 


Glucose 


 


POC Glucose  246 H


 


Calcium 








                                        





Chest X-Ray  12/25/19 19:52


IMPRESSION:


 


Equivocal right basilar airspace opacity. There is elevation


right hemidiaphragm. Cardiac megaly


 


 


copyright 2011 [employer]- All Rights Reserved


 








Chest X-Ray  12/29/19 00:00


IMPRESSION:  Chronic lung changes, stable appearance.


 








Head CT  12/29/19 00:00


IMPRESSION:  No hemorrhage.  9 mm ovoid hyperdensity in the left cerebellar 

hemisphere suggesting lacunar infarction, possibly acute.


EVIDENCE OF ACUTE STROKE: YES.  LEFT VERTEBROBASILAR


 








Brain MRI with MRA  12/30/19 00:00


IMPRESSION:  NORMAL MRA OF THE [place] OF [place].


 








Carotid Doppler Study  12/30/19 00:00


IMPRESSION:  NO HEMODYNAMICALLY SIGNIFICANT STENOSIS.


 








Chest X-Ray  12/30/19 00:00


IMPRESSION:  STABLE CHRONIC CHANGES.  NO ACUTE RADIOGRAPHIC FINDING IN THE 

CHEST.


 








Head MRI  12/30/19 00:00


IMPRESSION:  OLD LACUNAR INFARCT IN THE LEFT CEREBELLAR HEMISPHERE.  NO OTHER 

SIGNIFICANT OR ACUTE FINDINGS.


EVIDENCE OF ACUTE STROKE: NO.


 








Modified Barium Swallow  12/31/19 00:00


IMPRESSION:  NO EVIDENCE OF PENETRATION OR ASPIRATION. PLEASE SEE SPEECH 

PATHOLOGIST REPORT FOR OTHER FINDINGS AND RECOMMENDATIONS.


 


IMPRESSION/RECOMMENDATION:





1.  Acute lacunar infarct by CT scan of the head.  But MRI shows that there is 

old lacunar infarct.  The patient has no signs or symptoms of a CVA.  


2.Acute on chronic respiratory failure.  Continue supplemental oxygen, and 

current anti-COPD medication.  This is improved.


3.  Acute infective bronchitis/possible right lower lobe pneumonia: Continue 

current treatment including antibiotics.


4.  Intermittent altered sensorium with somnolence since most likely secondary 

to hypercapnia, but would also recommend getting the patient a CT of the head to

 make sure there is no acute intracranial pathology.  This is resolved.


5.  Most likely acute on chronic systolic heart failure: Continue diuretics, in 

view of the patient's current current wheezing will not start the patient on 

Toprol-XL at present.  We will start the patient on Entresto.  If tachycardia 

persists will give 1 dose of digoxin.  The patient still has some wheezing and 

hence we will start not start Toprol-XL will try from tomorrow.


6.  Pulmonary fibrosis.?  Right lower lobe pneumonia.  We will repeat the 

patient's chest x-ray in a.m.


7.  History of asthma and COPD.


8.  Hypertension: Continue current medication.  Increase Entresto as tolerated. 

 Later would add a beta-blocker.


9. Atrial flutter.  There is also right bundle branch block pattern and left 

anterior fascicular block.  [Bifascicular block].  This raises the possibility 

of post tachycardia induced cardiomyopathy.  Will increase the patient's Toprol-

XL to 50 mg p.o. every 12 hours.  In view of the cardiomyopathy we will continue

patient on Entresto.  In view of the patient's heart rate still being up we will

start the patient on IV Cardizem infusion at 7.5 mg/h.  Also her old records 

from [place] were reviewed.  There is one EKG done which is suspicious for

atrial flutter although the official reading is sinus tachycardia.  But I 

strongly believe that that EKG is atrial flutter, and hence the patient has had 

longstanding atrial fibrillation or at least intermittent paroxysmal atrial 

fibrillation since 2017, the data of that prior EKG from [place].  The 

patient's Cardizem drip has been discontinued.  The patient Toprol will be 

increased 200 mg p.o. every 12 hours.  Once heart rate is adequately controlled 

for a period of time, then would repeat the patient's echocardiogram to see if 

indeed the patient's cardiomyopathy is secondary to tachycardia induced cardi

omyopathy.  This has been discussed with the patient.


10.  Diabetes mellitus type 2 non-insulin-dependent.


11.  Hypothyroidism:: Continue thyroid replacement.  Note the patient's repeat 

TSH is normal, and free T3 and free T4 are normal...  


12 Moderate to severe tricuspid regurgitation.  At least moderate pulmonary 

hypertension with right ventricle systolic pressure of 53 to 58 mmHg.


13.  Cardiomyopathy with moderate to severely reduced ejection fraction, most 

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  But will reassess.  We 

will treat the patient with beta-blocker and Entresto.  Will increase the 

patient's Entresto.  


14.  With history of pulmonary embolism: Patient states she is on Xarelto.  Will

continue Xarelto.  


15.  History of breast cancer status post bilateral mastectomies and 

chemotherapy and radiation treatment.


16.  History of multiple abdominal surgeries.


17.  Morbid obesity.





Medications reviewed.  Medical regimen and management plan discussed with the 

hospitalist Dr. Romero.  Medical decision making is of moderate complexity.  40 

minutes spent on this patient more than 50% of time spent in direct patient 

care.  Will follow.

## 2020-01-02 RX ADMIN — RIVAROXABAN SCH MG: 10 TABLET, FILM COATED ORAL at 17:30

## 2020-01-02 RX ADMIN — Medication SCH ML: at 17:33

## 2020-01-02 RX ADMIN — BUDESONIDE SCH MG: 0.5 SUSPENSION RESPIRATORY (INHALATION) at 21:24

## 2020-01-02 RX ADMIN — DOCUSATE SODIUM SCH MG: 100 CAPSULE, LIQUID FILLED ORAL at 17:30

## 2020-01-02 RX ADMIN — PREGABALIN SCH MG: 100 CAPSULE ORAL at 14:50

## 2020-01-02 RX ADMIN — LEVOTHYROXINE SODIUM SCH MG: 50 TABLET ORAL at 06:42

## 2020-01-02 RX ADMIN — VANCOMYCIN HYDROCHLORIDE SCH MLS/HR: 1 INJECTION, POWDER, LYOPHILIZED, FOR SOLUTION INTRAVENOUS at 11:47

## 2020-01-02 RX ADMIN — INSULIN HUMAN SCH UNIT: 100 INJECTION, SOLUTION PARENTERAL at 08:23

## 2020-01-02 RX ADMIN — PREDNISONE SCH MG: 20 TABLET ORAL at 10:06

## 2020-01-02 RX ADMIN — Medication SCH ML: at 22:00

## 2020-01-02 RX ADMIN — PREGABALIN SCH MG: 100 CAPSULE ORAL at 21:46

## 2020-01-02 RX ADMIN — DOCUSATE SODIUM SCH MG: 100 CAPSULE, LIQUID FILLED ORAL at 10:06

## 2020-01-02 RX ADMIN — Medication SCH ML: at 06:42

## 2020-01-02 RX ADMIN — PREGABALIN SCH MG: 100 CAPSULE ORAL at 06:42

## 2020-01-02 RX ADMIN — DILTIAZEM HYDROCHLORIDE SCH MG: 30 TABLET, FILM COATED ORAL at 21:47

## 2020-01-02 RX ADMIN — HYDROCODONE BITARTRATE AND ACETAMINOPHEN PRN TAB: 5; 325 TABLET ORAL at 21:46

## 2020-01-02 RX ADMIN — CEFEPIME SCH MLS/HR: 2 INJECTION, POWDER, FOR SOLUTION INTRAMUSCULAR; INTRAVENOUS at 21:49

## 2020-01-02 RX ADMIN — CEFEPIME SCH MLS/HR: 2 INJECTION, POWDER, FOR SOLUTION INTRAMUSCULAR; INTRAVENOUS at 06:42

## 2020-01-02 RX ADMIN — DILTIAZEM HYDROCHLORIDE SCH: 30 TABLET, FILM COATED ORAL at 03:21

## 2020-01-02 RX ADMIN — FAMOTIDINE SCH MG: 20 TABLET, FILM COATED ORAL at 10:06

## 2020-01-02 RX ADMIN — BUDESONIDE SCH MG: 0.5 SUSPENSION RESPIRATORY (INHALATION) at 08:36

## 2020-01-02 RX ADMIN — SACUBITRIL AND VALSARTAN SCH TAB: 49; 51 TABLET, FILM COATED ORAL at 10:06

## 2020-01-02 RX ADMIN — INSULIN HUMAN SCH UNIT: 100 INJECTION, SOLUTION PARENTERAL at 21:52

## 2020-01-02 RX ADMIN — METOPROLOL SUCCINATE SCH MG: 50 TABLET, EXTENDED RELEASE ORAL at 10:05

## 2020-01-02 RX ADMIN — DILTIAZEM HYDROCHLORIDE SCH MG: 30 TABLET, FILM COATED ORAL at 14:50

## 2020-01-02 RX ADMIN — PREDNISONE SCH MG: 20 TABLET ORAL at 17:30

## 2020-01-02 RX ADMIN — INSULIN HUMAN SCH UNIT: 100 INJECTION, SOLUTION PARENTERAL at 12:43

## 2020-01-02 RX ADMIN — INSULIN HUMAN SCH UNIT: 100 INJECTION, SOLUTION PARENTERAL at 17:29

## 2020-01-02 RX ADMIN — SACUBITRIL AND VALSARTAN SCH TAB: 49; 51 TABLET, FILM COATED ORAL at 21:46

## 2020-01-02 RX ADMIN — CEFEPIME SCH MLS/HR: 2 INJECTION, POWDER, FOR SOLUTION INTRAMUSCULAR; INTRAVENOUS at 14:41

## 2020-01-02 RX ADMIN — DILTIAZEM HYDROCHLORIDE SCH MG: 30 TABLET, FILM COATED ORAL at 08:24

## 2020-01-02 RX ADMIN — METOPROLOL SUCCINATE SCH MG: 50 TABLET, EXTENDED RELEASE ORAL at 21:45

## 2020-01-02 RX ADMIN — FAMOTIDINE SCH MG: 20 TABLET, FILM COATED ORAL at 21:45

## 2020-01-02 NOTE — PROGRESS NOTE
Provider Note


Provider Note: 





CARDIOLOGY PROGRESS NOTE by Dr. Nuvia Abdul on 1/2/2020.





SUBJECTIVE: The patient last night had A. fib flutter with rapid ventricular 

response and needed IV Cardizem.  This morning she is off IV Cardizem and she is

on a beta-blocker but still heart rate was up.  A small dose of Cardizem to 30 

mg p.o. every 8 hours has been added.  And the heart rate is much improved.  She

denies any chest pain or discomfort.  Shortness of breath is much improved but 

still has shortness of breath with minimal movement in the bed.  There is no 

chest pains.  She has cough which is now much less, but is nonproductive.  There

is no hemoptysis there is no pleuritic chest pain.  There is no wheezing today. 

There is no ventricular arrhythmias seen.  There is no TIA CVA symptoms.  There 

is no bleeding on Xarelto.








PHYSICAL EXAMINATION: The patient morbidly obese.  At present in no acute 

distress


                                                                Selected Entries











  01/02/20





  12:21


 


Temperature 98.5 F


 


Temperature Oral





Source 


 


Pulse Rate 66


 


Respiratory 16





Rate 


 


Blood Pressure 127/74 H


 


Blood Pressure 91





Mean 


 


BP Location Right Arm


 


BP Position Supine


 


O2 Sat by Pulse 100





Oximetry 


 


Oxygen Flow 2.00





Rate 


 


Oxygen Delivery Nasal Cannula





Method 





HEAD: Is atraumatic.  Normocephalic.  EYES: Pupils are equal round regular 

reactive to light and accommodation.  Extraocular movements are normal there is 

no conjunctival pallor.  There is no scleral icterus.  EARS: Tympanic membranes 

are intact.  External auditory canals are clear.  NOSE: There is no deviated 

nasal septum.  There is no inflammation of the nasal mucous membrane.  MOUTH: 

There is no ulcers in the mouth.  Mucous membranes of the mouth are moist.  

THROAT: There is no redness of the oropharynx.  There is no exudates.  SKIN: 

There is no skin rashes.  There is no petechia or ecchymosis.  NECK: Is supple. 

There is mild JVD present.  Carotids are equal there is no bruit.  There is no 

lymphadenopathy.  There is no goiter.  There is no accessory muscle respiration 

use.  Trachea is central.  LUNGS: Shows diminished air entry prolonged 

expiration.  There is scattered end expiratory wheezing and rhonchi.  There is 

"Velcro" [E] rales of pulmonary fibrosis bilaterally in the bases.  There is a 

few fine rales of CHF.  HEART: S1-S2 is heard.  There is no S3 gallop.  There is

 no S4 gallop.  There is systolic murmur of tricuspid regurgitation and mild 

mitral regurgitation murmur present.  There is no S3 gallop.  There is no S4 

gallop.  There is no rub.  ABDOMEN: Is obese.  Nontender.  There is no 

paraspinal megaly.  Bowel sounds are well heard.  EXTREMITIES: Femorals are 

deep.  Femorals are diminished there is no femoral bruits.  Leg pulses are 

diminished.  There is 1+ bilateral edema.  There is no DVT or cellulitis.  There

 is no cyanosis or clubbing.  There is no calf tenderness.  CNS: The patient is 

conscious awake alert oriented x3 with no focal deficit.  PSYCHIATRIC:.  The 

patient judgment insight are intact her affect is normal.








IMPRESSION/RECOMMENDATION:





1.  Acute lacunar infarct by CT scan of the head.  But MRI shows that there is 

old lacunar infarct.  The patient has no signs or symptoms of a CVA.  


2.Acute on chronic respiratory failure.  Continue supplemental oxygen, and 

current anti-COPD medication.  This is improved.


3.  Acute infective bronchitis/possible right lower lobe pneumonia: Continue 

current treatment including antibiotics.


4.  Intermittent altered sensorium with somnolence since most likely secondary 

to hypercapnia, but would also recommend getting the patient a CT of the head to

 make sure there is no acute intracranial pathology.  This is resolved.


5.  Most likely acute on chronic systolic heart failure: Continue diuretics, in 

view of the patient's current current wheezing will not start the patient on 

Toprol-XL at present.  We will start the patient on Entresto.  If tachycardia 

persists will give 1 dose of digoxin.  The patient still has some wheezing and 

hence we will start not start Toprol-XL will try from tomorrow.


6.  Pulmonary fibrosis.?  Right lower lobe pneumonia.  We will repeat the 

patient's chest x-ray in a.m.


7.  History of asthma and COPD.


8.  Hypertension: Continue current medication.  Increase Entresto as tolerated. 

 Later would add a beta-blocker.


9. Atrial flutter.  There is also right bundle branch block pattern and left 

anterior fascicular block.  [Bifascicular block].  This raises the possibility 

of post tachycardia induced cardiomyopathy.  Will increase the patient's Toprol-

XL to 50 mg p.o. every 12 hours.  In view of the cardiomyopathy we will continue

patient on Entresto.  In view of the patient's heart rate still being up we will

start the patient on IV Cardizem infusion at 7.5 mg/h.  Also her old records 

from [place] were reviewed.  There is one EKG done which is suspicious for

atrial flutter although the official reading is sinus tachycardia.  But I 

strongly believe that that EKG is atrial flutter, and hence the patient has had 

longstanding atrial fibrillation or at least intermittent paroxysmal atrial 

fibrillation since 2017, the data of that prior EKG from [place].  The 

patient's Cardizem drip has been discontinued.  The patient Toprolas  w 

increased 200 mg p.o. every 12 hours.  But only with addition of a small dose of

Cardizem p.o. the patient's heart rate is now much improved.


10.  Diabetes mellitus type 2 non-insulin-dependent.


11.  Hypothyroidism:: Continue thyroid replacement.  Note the patient's repeat 

TSH is normal, and free T3 and free T4 are normal...  


12 Moderate to severe tricuspid regurgitation.  At least moderate pulmonary 

hypertension with right ventricle systolic pressure of 53 to 58 mmHg.


13.  Cardiomyopathy with moderate to severely reduced ejection fraction, most 

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  But will reassess.  We 

will treat the patient with beta-blocker and Entresto.  Will increase the 

patient's Entresto.  


14.  With history of pulmonary embolism: Patient states she is on Xarelto.  Will

continue Xarelto.  


15.  History of breast cancer status post bilateral mastectomies and chemo

therapy and radiation treatment.


16.  History of multiple abdominal surgeries.


17.  Morbid obesity.





Medications reviewed.  Medical regimen and management plan discussed with 

attending physician on the case.  Medical decision making is of moderate 

complexity.  50 minutes spent as patient with more than 50% of time spent in 

direct patient care.  Follow

## 2020-01-02 NOTE — PDOC PROGRESS REPORT
Subjective


Progress Note for:: 01/02/20


Subjective:: 


This is a 79-year-old female with chronic respiratory failure, COPD, history of 

PE, congestive heart failure and history of pulmonary fibrosis initially 

presented with increasing shortness of breath.  She was admitted for acute and 

chronic asthma hypoxic respiratory failure multifactorial from CHF exacerbation,

right lobe pneumonia and COPD exacerbation.





On the floor she developed atrial fibrillation with RVR.  She reportedly had 

confusion and there was initial concern for possible CVA.  She was subsequently 

transferred to the ICU.  She was seen by cardiology.  She was also started on 

Cardizem drip.  She was downgraded to the floor yesterday 12/31.





1/1: She was weaned off Cardizem drip overnight.  Upon encounter, she is 

currently saturating well and comfortable on BiPAP.  She denies chest pain.  She

says her shortness of breath continue to improve.  Heart rate running in the 

110s-120s.





1/2: She wentinto RVR last night and was given IV Cardizem push.  She was also 

started on p.o. Cardizem.  She continues to feel better.  Weaned off BiPAP.  

Saturating well on 3 L via nasal cannula.  She is on 2.5 L of home O2.  She says

her breathing continued to improve.  Heart rate running in the 80s but 

occasionally goes up to 110s when she ambulates.


Reason For Visit: 


ACUTE ON CHRONIC DISTOLIC CONGESTIVE HEART FAILURE








Physical Exam


Vital Signs: 


                                        











Temp Pulse Resp BP Pulse Ox


 


 98.0 F   127 H  21 H  114/74   96 


 


 01/01/20 23:10  01/02/20 08:36  01/02/20 13:12  01/01/20 23:10  01/02/20 13:12








                                 Intake & Output











 01/01/20 01/02/20 01/03/20





 06:59 06:59 06:59


 


Intake Total 638 725 


 


Output Total 950 550 


 


Balance -312 175 


 


Weight 182 lb 15.739 oz 235 lb 3.732 oz 











General appearance: PRESENT: no acute distress, well-developed, well-nourished


Head exam: PRESENT: atraumatic, normocephalic


Eye exam: PRESENT: conjunctiva pink, EOMI, PERRLA.  ABSENT: scleral icterus


Ear exam: PRESENT: normal external ear exam


Mouth exam: PRESENT: moist, tongue midline


Neck exam: ABSENT: carotid bruit, JVD, lymphadenopathy, thyromegaly


Respiratory exam: PRESENT: rhonchi.  ABSENT: rales, wheezes


Cardiovascular exam: PRESENT: irregular rhythm.  ABSENT: systolic murmur


Pulses: PRESENT: normal dorsalis pedis pul


GI/Abdominal exam: PRESENT: normal bowel sounds, soft.  ABSENT: distended, 

guarding, mass, organolmegaly, rebound, tenderness


Rectal exam: PRESENT: deferred


Neurological exam: PRESENT: alert, awake, oriented to person, oriented to place,

oriented to time, oriented to situation, CN II-XII grossly intact.  ABSENT: 

motor sensory deficit





Results


Laboratory Results: 


                                        





                                 01/01/20 05:46 





                                 01/01/20 05:46 





                                        











  12/25/19 12/25/19 12/25/19





  20:45 20:45 23:14


 


Creatine Kinase  46  


 


Troponin I   0.025  0.027


 


NT-Pro-B Natriuret Pep   2580 H 














  12/26/19 12/26/19 12/29/19





  05:05 11:08 07:58


 


Creatine Kinase   


 


Troponin I  0.027  0.024  0.015


 


NT-Pro-B Natriuret Pep   











Impressions: 


                                        





Head CT  12/29/19 00:00


IMPRESSION:  No hemorrhage.  9 mm ovoid hyperdensity in the left cerebellar he

misphere suggesting lacunar infarction, possibly acute.


EVIDENCE OF ACUTE STROKE: YES.  LEFT VERTEBROBASILAR


 








Brain MRI with MRA  12/30/19 00:00


IMPRESSION:  NORMAL MRA OF THE [place].


 








Carotid Doppler Study  12/30/19 00:00


IMPRESSION:  NO HEMODYNAMICALLY SIGNIFICANT STENOSIS.


 








Chest X-Ray  12/30/19 00:00


IMPRESSION:  STABLE CHRONIC CHANGES.  NO ACUTE RADIOGRAPHIC FINDING IN THE 

CHEST.


 








Head MRI  12/30/19 00:00


IMPRESSION:  OLD LACUNAR INFARCT IN THE LEFT CEREBELLAR HEMISPHERE.  NO OTHER 

SIGNIFICANT OR ACUTE FINDINGS.


EVIDENCE OF ACUTE STROKE: NO.


 








Modified Barium Swallow  12/31/19 00:00


IMPRESSION:  NO EVIDENCE OF PENETRATION OR ASPIRATION. PLEASE SEE SPEECH 

PATHOLOGIST REPORT FOR OTHER FINDINGS AND RECOMMENDATIONS.


 














Assessment and Plan





- Diagnosis


(1) Acute and chronic respiratory failure with hypoxia


Is this a current diagnosis for this admission?: Yes   


Plan: 


Multifactorial from COPD exacerbation, CHF exacerbation and pneumonia.





1/2: Weaned off BiPAP.  Saturating well on 3 L via nasal cannula.  She is on 2.5

L of home O2.  She says her breathing continued to improve.  








(2) Atrial fibrillation


Is this a current diagnosis for this admission?: Yes   


Plan: 


1/1: Wean off Cardizem drip overnight.  Discussed with cardiology.  Increase 

metoprolol to 100 mg every 12.  Tolerating Xarelto well.





1/2: She went into RVR last night and was given IV Cardizem push.  She was also 

started on p.o. Cardizem.  Heart rate running in the 80s but occasionally goes 

up to 110s when she ambulates. Cardiology following. Continue PO cardizem and 

metoprolol.








(3) Acute on chronic diastolic congestive heart failure


Is this a current diagnosis for this admission?: Yes   


Plan: 


Echocardiogram shows ejection fraction 30-35%.  On Entresto.  Continue 

metoprolol.








(4) Pneumonia


Is this a current diagnosis for this admission?: Yes   


Plan: 


Continue IV antibiotics.  








(5) Chronic obstructive pulmonary disease (COPD)


Qualifiers: 


   COPD type: unspecified COPD   Qualified Code(s): J44.9 - Chronic obstructive 

pulmonary disease, unspecified   


Is this a current diagnosis for this admission?: Yes   


Plan: 


Keep breathing treatments prn due to tachycardia/Afib. 





1/2: Continue prednisone.








- Plan Summary


Summary: 


Patient is admitted to a telemetry bed where she will receive usual supportive 

and symptomatic cares.  She will be treated with IV Bumex 1 mg every 6 hours to 

help resolve her acute congestive heart failure.  She will also receive 

supplemental oxygen utilizing nasal cannula and or noninvasive airway pressure 

support devices such as CPAP or BiPAP to obtain an adequate oxygen saturation 

and avoid hypercapnia.  She will receive morphine sulfate 2 mg IV every 1 hour 

PRN severe dyspnea.  She will receive a pulmonary toilet utilizing Atrovent and 

Pulmicort and will be maintained on antibiotic therapy for possible 

pneumonia/pneumonitis with Levaquin 750 mg p.o. daily x4 days.  Patient will be 

continued on her usual home medications as soon as her medication reconciliation

has been verified and completed.  A nutritionist consultation will be obtained 

to  the patient on her obesity and possible diet interventions.  Patient 

will be continued on her cardiac and diabetic restricted diet.  Before meals and

at bedtime Accu-Cheks will be obtained with sliding scale insulin used for 

control of hyperglycemia and a hypoglycemic protocol in place.





- Time


Time Spent with patient: 25-34 minutes

## 2020-01-03 LAB — VANCOMYCIN,TROUGH: 6.2 UG/ML (ref 5–20)

## 2020-01-03 RX ADMIN — FAMOTIDINE SCH MG: 20 TABLET, FILM COATED ORAL at 21:37

## 2020-01-03 RX ADMIN — PREGABALIN SCH MG: 100 CAPSULE ORAL at 14:55

## 2020-01-03 RX ADMIN — INSULIN HUMAN SCH UNIT: 100 INJECTION, SOLUTION PARENTERAL at 08:50

## 2020-01-03 RX ADMIN — METOPROLOL SUCCINATE SCH MG: 50 TABLET, EXTENDED RELEASE ORAL at 11:11

## 2020-01-03 RX ADMIN — BUDESONIDE SCH MG: 0.5 SUSPENSION RESPIRATORY (INHALATION) at 21:11

## 2020-01-03 RX ADMIN — DILTIAZEM HYDROCHLORIDE SCH MG: 60 TABLET, FILM COATED ORAL at 21:36

## 2020-01-03 RX ADMIN — VANCOMYCIN HYDROCHLORIDE SCH MLS/HR: 1 INJECTION, POWDER, LYOPHILIZED, FOR SOLUTION INTRAVENOUS at 11:13

## 2020-01-03 RX ADMIN — PREDNISONE SCH MG: 20 TABLET ORAL at 11:12

## 2020-01-03 RX ADMIN — PREGABALIN SCH MG: 100 CAPSULE ORAL at 06:28

## 2020-01-03 RX ADMIN — SACUBITRIL AND VALSARTAN SCH TAB: 49; 51 TABLET, FILM COATED ORAL at 21:37

## 2020-01-03 RX ADMIN — PREGABALIN SCH MG: 100 CAPSULE ORAL at 21:37

## 2020-01-03 RX ADMIN — LEVOTHYROXINE SODIUM SCH MG: 50 TABLET ORAL at 06:28

## 2020-01-03 RX ADMIN — DILTIAZEM HYDROCHLORIDE SCH MG: 30 TABLET, FILM COATED ORAL at 08:50

## 2020-01-03 RX ADMIN — ACETYLCYSTEINE SCH MG: 200 INHALANT RESPIRATORY (INHALATION) at 08:57

## 2020-01-03 RX ADMIN — RIVAROXABAN SCH MG: 10 TABLET, FILM COATED ORAL at 17:25

## 2020-01-03 RX ADMIN — DOCUSATE SODIUM SCH MG: 100 CAPSULE, LIQUID FILLED ORAL at 17:24

## 2020-01-03 RX ADMIN — INSULIN HUMAN SCH UNIT: 100 INJECTION, SOLUTION PARENTERAL at 21:50

## 2020-01-03 RX ADMIN — INSULIN HUMAN SCH UNIT: 100 INJECTION, SOLUTION PARENTERAL at 12:30

## 2020-01-03 RX ADMIN — FAMOTIDINE SCH MG: 20 TABLET, FILM COATED ORAL at 11:11

## 2020-01-03 RX ADMIN — SACUBITRIL AND VALSARTAN SCH TAB: 49; 51 TABLET, FILM COATED ORAL at 11:12

## 2020-01-03 RX ADMIN — DILTIAZEM HYDROCHLORIDE SCH MG: 30 TABLET, FILM COATED ORAL at 03:20

## 2020-01-03 RX ADMIN — PREDNISONE SCH MG: 20 TABLET ORAL at 17:25

## 2020-01-03 RX ADMIN — DOCUSATE SODIUM SCH MG: 100 CAPSULE, LIQUID FILLED ORAL at 11:11

## 2020-01-03 RX ADMIN — METOPROLOL SUCCINATE SCH MG: 50 TABLET, EXTENDED RELEASE ORAL at 21:37

## 2020-01-03 RX ADMIN — CEFEPIME SCH MLS/HR: 2 INJECTION, POWDER, FOR SOLUTION INTRAMUSCULAR; INTRAVENOUS at 06:28

## 2020-01-03 RX ADMIN — CEFEPIME SCH MLS/HR: 2 INJECTION, POWDER, FOR SOLUTION INTRAMUSCULAR; INTRAVENOUS at 14:55

## 2020-01-03 RX ADMIN — ACETYLCYSTEINE SCH: 200 INHALANT RESPIRATORY (INHALATION) at 19:01

## 2020-01-03 RX ADMIN — BUDESONIDE SCH MG: 0.5 SUSPENSION RESPIRATORY (INHALATION) at 08:57

## 2020-01-03 RX ADMIN — Medication SCH ML: at 21:40

## 2020-01-03 RX ADMIN — HYDROCODONE BITARTRATE AND ACETAMINOPHEN PRN TAB: 5; 325 TABLET ORAL at 17:23

## 2020-01-03 RX ADMIN — DILTIAZEM HYDROCHLORIDE SCH MG: 30 TABLET, FILM COATED ORAL at 14:55

## 2020-01-03 RX ADMIN — Medication SCH ML: at 17:30

## 2020-01-03 RX ADMIN — Medication SCH ML: at 06:28

## 2020-01-03 RX ADMIN — INSULIN HUMAN SCH UNIT: 100 INJECTION, SOLUTION PARENTERAL at 17:26

## 2020-01-03 NOTE — PDOC PROGRESS REPORT
Subjective


Progress Note for:: 01/03/20


Subjective:: 


This is a 79-year-old female with chronic respiratory failure, COPD, history of 

PE, congestive heart failure and history of pulmonary fibrosis initially 

presented with increasing shortness of breath.  She was admitted for acute and 

chronic asthma hypoxic respiratory failure multifactorial from CHF exacerbation,

right lobe pneumonia and COPD exacerbation.





On the floor she developed atrial fibrillation with RVR.  She reportedly had 

confusion and there was initial concern for possible CVA.  She was subsequently 

transferred to the ICU.  She was seen by cardiology.  She was also started on 

Cardizem drip.  She was downgraded to the floor yesterday 12/31.





1/1: She was weaned off Cardizem drip overnight.  Upon encounter, she is 

currently saturating well and comfortable on BiPAP.  She denies chest pain.  She

says her shortness of breath continue to improve.  Heart rate running in the 

110s-120s.





1/2: She went into RVR last night and was given IV Cardizem push.  She was also 

started on p.o. Cardizem.  She continues to feel better.  Weaned off BiPAP.  

Saturating well on 3 L via nasal cannula.  She is on 2.5 L of home O2.  She says

her breathing continued to improve.  Heart rate running in the 80s but 

occasionally goes up to 110s when she ambulates.





1/3: No acute event overnight.  She continues to improve and feel better.  She 

does still have a few episodes of getting tachycardic when she ambulates but 

overall heart rate has improved.  Discussed with cardiology and Cardizem PO has 

been increased to 60 mg every 8.


Reason For Visit: 


ACUTE ON CHRONIC DISTOLIC CONGESTIVE HEART FAILURE








Physical Exam


Vital Signs: 


                                        











Temp Pulse Resp BP Pulse Ox


 


 97.4 F   83   20   118/82   97 


 


 01/03/20 07:41  01/03/20 14:00  01/03/20 08:55  01/03/20 07:41  01/03/20 08:55








                                 Intake & Output











 01/02/20 01/03/20 01/04/20





 06:59 06:59 06:59


 


Intake Total 725 1410 720


 


Output Total 550 950 300


 


Balance 175 460 420


 


Weight 235 lb 3.732 oz 237 lb 7.005 oz 











General appearance: PRESENT: no acute distress, well-developed, well-nourished


Head exam: PRESENT: atraumatic, normocephalic


Eye exam: PRESENT: conjunctiva pink, EOMI, PERRLA.  ABSENT: scleral icterus


Ear exam: PRESENT: normal external ear exam


Mouth exam: PRESENT: moist, tongue midline


Neck exam: ABSENT: carotid bruit, JVD, lymphadenopathy, thyromegaly


Respiratory exam: PRESENT: clear to auscultation renay.  ABSENT: rales, rhonchi, 

wheezes


Cardiovascular exam: PRESENT: irregular rhythm, tachycardia.  ABSENT: diastolic 

murmur, rubs, systolic murmur


Pulses: PRESENT: normal dorsalis pedis pul


GI/Abdominal exam: PRESENT: normal bowel sounds, soft.  ABSENT: distended, 

guarding, mass, organolmegaly, rebound, tenderness


Rectal exam: PRESENT: deferred


Neurological exam: PRESENT: alert, awake, oriented to person, oriented to place,

oriented to time, oriented to situation, CN II-XII grossly intact.  ABSENT: 

motor sensory deficit





Results


Laboratory Results: 


                                        





                                 01/01/20 05:46 





                                 01/03/20 11:30 





                                        











  01/03/20





  11:30


 


Creatinine  0.74


 


Est GFR ( Amer)  > 60








                                        











  12/25/19 12/25/19 12/25/19





  20:45 20:45 23:14


 


Creatine Kinase  46  


 


Troponin I   0.025  0.027


 


NT-Pro-B Natriuret Pep   2580 H 














  12/26/19 12/26/19 12/29/19





  05:05 11:08 07:58


 


Creatine Kinase   


 


Troponin I  0.027  0.024  0.015


 


NT-Pro-B Natriuret Pep   











Impressions: 


                                        





Head CT  12/29/19 00:00


IMPRESSION:  No hemorrhage.  9 mm ovoid hyperdensity in the left cerebellar 

hemisphere suggesting lacunar infarction, possibly acute.


EVIDENCE OF ACUTE STROKE: YES.  LEFT VERTEBROBASILAR


 








Brain MRI with MRA  12/30/19 00:00


IMPRESSION:  NORMAL MRA OF THE [place].


 








Carotid Doppler Study  12/30/19 00:00


IMPRESSION:  NO HEMODYNAMICALLY SIGNIFICANT STENOSIS.


 








Chest X-Ray  12/30/19 00:00


IMPRESSION:  STABLE CHRONIC CHANGES.  NO ACUTE RADIOGRAPHIC FINDING IN THE 

CHEST.


 








Head MRI  12/30/19 00:00


IMPRESSION:  OLD LACUNAR INFARCT IN THE LEFT CEREBELLAR HEMISPHERE.  NO OTHER 

SIGNIFICANT OR ACUTE FINDINGS.


EVIDENCE OF ACUTE STROKE: NO.


 








Modified Barium Swallow  12/31/19 00:00


IMPRESSION:  NO EVIDENCE OF PENETRATION OR ASPIRATION. PLEASE SEE SPEECH 

PATHOLOGIST REPORT FOR OTHER FINDINGS AND RECOMMENDATIONS.


 














Assessment and Plan





- Diagnosis


(1) Acute and chronic respiratory failure with hypoxia


Is this a current diagnosis for this admission?: Yes   


Plan: 


Multifactorial from COPD exacerbation, CHF exacerbation and pneumonia.





1/2: Weaned off BiPAP.  Saturating well on 3 L via nasal cannula.  She is on 2.5

L of home O2.  She says her breathing continued to improve.  





1/3: Doing well on home O2 requirement (2.5 L).








(2) Atrial fibrillation


Is this a current diagnosis for this admission?: Yes   


Plan: 


1/1: Wean off Cardizem drip overnight.  Discussed with cardiology.  Increase 

metoprolol to 100 mg every 12.  Tolerating Xarelto well.





1/2: She went into RVR last night and was given IV Cardizem push.  She was also 

started on p.o. Cardizem.  Heart rate running in the 80s but occasionally goes 

up to 110s when she ambulates. Cardiology following. Continue PO cardizem and 

metoprolol.





1/3: Discussed with cardiology and Cardizem PO has been increased to 60 mg every

8.








(3) Acute on chronic diastolic congestive heart failure


Is this a current diagnosis for this admission?: Yes   


Plan: 


Echocardiogram shows ejection fraction 30-35%.  On Entresto.  Continue 

metoprolol.








(4) Pneumonia


Is this a current diagnosis for this admission?: Yes   


Plan: 


Continue IV antibiotics.  





1/3: Switch to PO Levaquin.








(5) Chronic obstructive pulmonary disease (COPD)


Qualifiers: 


   COPD type: unspecified COPD   Qualified Code(s): J44.9 - Chronic obstructive 

pulmonary disease, unspecified   


Is this a current diagnosis for this admission?: Yes   


Plan: 


Keep breathing treatments prn due to tachycardia/Afib. 





1/2: Continue prednisone.








- Plan Summary


Summary: 


Patient is admitted to a telemetry bed where she will receive usual supportive 

and symptomatic cares.  She will be treated with IV Bumex 1 mg every 6 hours to 

help resolve her acute congestive heart failure.  She will also receive 

supplemental oxygen utilizing nasal cannula and or noninvasive airway pressure 

support devices such as CPAP or BiPAP to obtain an adequate oxygen saturation 

and avoid hypercapnia.  She will receive morphine sulfate 2 mg IV every 1 hour 

PRN severe dyspnea.  She will receive a pulmonary toilet utilizing Atrovent and 

Pulmicort and will be maintained on antibiotic therapy for possible 

pneumonia/pneumonitis with Levaquin 750 mg p.o. daily x4 days.  Patient will be 

continued on her usual home medications as soon as her medication reconciliation

has been verified and completed.  A nutritionist consultation will be obtained 

to  the patient on her obesity and possible diet interventions.  Patient 

will be continued on her cardiac and diabetic restricted diet.  Before meals and

at bedtime Accu-Cheks will be obtained with sliding scale insulin used for 

control of hyperglycemia and a hypoglycemic protocol in place.





- Time


Time Spent with patient: 25-34 minutes

## 2020-01-03 NOTE — PROGRESS NOTE
Provider Note


Provider Note: 





CARDIOLOGY PROGRESS NOTE by Dr. Nuvia Abdul on 1/3/2020.





Subjective: Patient Is Making Slow Improvement.  She Denies Shortness of Breath 

at Rest.  But with the Walking to the Bathroom She Has Shortness of Breath and 

Her Heart Rate Goes up into the 140s.  She Has No Chest Pain Discomfort.  There 

Is No PND Orthopnea.  There Is No Ventricular Arrhythmias Seen.  There Is No 

Bleeding on Eliquis.  There Is No TIA CVA Symptoms.





Physical EXAMINATION: The patient is morbidly obese in no acute distress.


                                                                Selected Entries











  01/03/20 01/03/20





  11:00 11:09


 


Temperature  97.8 F


 


Temperature  Oral





Source  


 


Heart Rate ( 85 





Monitors)  


 


Respiratory  17





Rate  


 


Blood Pressure  109/57 L


 


Blood Pressure  74





Mean  


 


BP Location  Right Arm


 


BP Position  Supine


 


O2 Sat by Pulse  96





Oximetry  


 


Oxygen Flow  2.00





Rate  


 


Oxygen Delivery  Nasal Cannula





Method  





HEAD: Is atraumatic.  Normocephalic.  EYES: Pupils are equal round regular 

reactive to light and accommodation.  Extraocular movements are normal there is 

no conjunctival pallor.  There is no scleral icterus.  EARS: Tympanic membranes 

are intact.  External auditory canals are clear.  NOSE: There is no deviated 

nasal septum.  There is no inflammation of the nasal mucous membrane.  MOUTH: 

There is no ulcers in the mouth.  Mucous membranes of the mouth are moist.  

THROAT: There is no redness of the oropharynx.  There is no exudates.  SKIN: 

There is no skin rashes.  There is no petechia or ecchymosis.  NECK: Is supple. 

There is mild JVD present.  Carotids are equal there is no bruit.  There is no 

lymphadenopathy.  There is no goiter.  There is no accessory muscle respiration 

use.  Trachea is central.  LUNGS: Shows diminished air entry prolonged 

expiration.  There is scattered end expiratory wheezing and rhonchi.  There is 

"Velcro" [E] rales of pulmonary fibrosis bilaterally in the bases.  There is a 

few fine rales of CHF.  HEART: S1-S2 is heard.  There is no S3 gallop.  There is

 no S4 gallop.  There is systolic murmur of tricuspid regurgitation and mild 

mitral regurgitation murmur present.  There is no S3 gallop.  There is no S4 

gallop.  There is no rub.  ABDOMEN: Is obese.  Nontender.  There is no 

paraspinal megaly.  Bowel sounds are well heard.  EXTREMITIES: Femorals are 

deep.  Femorals are diminished there is no femoral bruits.  Leg pulses are 

diminished.  There is 1+ bilateral edema.  There is no DVT or cellulitis.  There

 is no cyanosis or clubbing.  There is no calf tenderness.  CNS: The patient is 

conscious awake alert oriented x3 with no focal deficit.  PSYCHIATRIC:.  The 

patient judgment insight are intact her affect is normal.


IMPRESSION/RECOMMENDATION:





1.  Acute lacunar infarct by CT scan of the head.  But MRI shows that there is 

old lacunar infarct.  The patient has no signs or symptoms of a CVA.  


2.Acute on chronic respiratory failure.  Continue supplemental oxygen, and curre

nt anti-COPD medication.  This is improved.


3.  Acute infective bronchitis/possible right lower lobe pneumonia: Continue 

current treatment including antibiotics.


4.  Intermittent altered sensorium with somnolence since most likely secondary 

to hypercapnia, but would also recommend getting the patient a CT of the head to

 make sure there is no acute intracranial pathology.  This is resolved.


5.  Most likely acute on chronic systolic heart failure: Continue diuretics, in 

view of the patient's current current wheezing will not start the patient on 

Toprol-XL at present.  We will start the patient on Entresto.  If tachycardia 

persists will give 1 dose of digoxin.  The patient still has some wheezing and 

hence we will start not start Toprol-XL will try from tomorrow.


6.  Pulmonary fibrosis.?  Right lower lobe pneumonia.  We will repeat the 

patient's chest x-ray in a.m.


7.  History of asthma and COPD.


8.  Hypertension: Continue current medication.  Increase Entresto as tolerated. 

 Later would add a beta-blocker.


9. Atrial flutter.  There is also right bundle branch block pattern and left 

anterior fascicular block.  [Bifascicular block].  This raises the possibility 

of post tachycardia induced cardiomyopathy.  Will increase the patient's Toprol-

XL to 50 mg p.o. every 12 hours.  In view of the cardiomyopathy we will continue

patient on Entresto.  In view of the patient's heart rate still being up we will

start the patient on IV Cardizem infusion at 7.5 mg/h.  Also her old records 

from [place] were reviewed.  There is one EKG done which is suspicious for

atrial flutter although the official reading is sinus tachycardia.  But I 

strongly believe that that EKG is atrial flutter, and hence the patient has had 

longstanding atrial fibrillation or at least intermittent paroxysmal atrial 

fibrillation since 2017, the data of that prior EKG from [place].  The 

patient's Cardizem drip has been discontinued.  The patient Toprol will be 

increased 200 mg p.o. every 12 hours.  Once heart rate is adequately controlled 

for a period of time, then would repeat the patient's echocardiogram to see if 

indeed the patient's cardiomyopathy is secondary to tachycardia induced 

cardiomyopathy.  This has been discussed with the patient.  Since the patient's 

heart rate does go up will increase the patient's Cardizem to 60 mg p.o. every 8

hours.


10.  Diabetes mellitus type 2 non-insulin-dependent.


11.  Hypothyroidism:: Continue thyroid replacement.  Note the patient's repeat 

TSH is normal, and free T3 and free T4 are normal...  


12 Moderate to severe tricuspid regurgitation.  At least moderate pulmonary 

hypertension with right ventricle systolic pressure of 53 to 58 mmHg.


13.  Cardiomyopathy with moderate to severely reduced ejection fraction, most 

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  But will reassess.  We 

will treat the patient with beta-blocker and Entresto.  Will increase the 

patient's Entresto.  


14.  With history of pulmonary embolism: Patient states she is on Xarelto.  Will

continue Xarelto.  


15.  History of breast cancer status post bilateral mastectomies and 

chemotherapy and radiation treatment.


16.  History of multiple abdominal surgeries.


17.  Morbid obesity.





Medications reviewed.  Medical regimen and management plan discussed with the 

hospitalist Dr. Romero.  Medical decision making is of high complexity, in view 

of the need to adjust medications..  40 minutes spent on this patient more than 

50% of time spent in direct patient care.  Will follow.

## 2020-01-04 RX ADMIN — FAMOTIDINE SCH MG: 20 TABLET, FILM COATED ORAL at 22:36

## 2020-01-04 RX ADMIN — DILTIAZEM HYDROCHLORIDE SCH MG: 90 TABLET ORAL at 22:35

## 2020-01-04 RX ADMIN — INSULIN HUMAN SCH UNIT: 100 INJECTION, SOLUTION PARENTERAL at 22:36

## 2020-01-04 RX ADMIN — METOPROLOL SUCCINATE SCH MG: 50 TABLET, EXTENDED RELEASE ORAL at 09:53

## 2020-01-04 RX ADMIN — RIVAROXABAN SCH MG: 10 TABLET, FILM COATED ORAL at 16:27

## 2020-01-04 RX ADMIN — Medication SCH ML: at 05:54

## 2020-01-04 RX ADMIN — METOPROLOL SUCCINATE SCH MG: 50 TABLET, EXTENDED RELEASE ORAL at 22:36

## 2020-01-04 RX ADMIN — DOCUSATE SODIUM SCH MG: 100 CAPSULE, LIQUID FILLED ORAL at 09:53

## 2020-01-04 RX ADMIN — ACETYLCYSTEINE SCH MG: 200 INHALANT RESPIRATORY (INHALATION) at 09:21

## 2020-01-04 RX ADMIN — INSULIN HUMAN SCH UNIT: 100 INJECTION, SOLUTION PARENTERAL at 16:27

## 2020-01-04 RX ADMIN — SACUBITRIL AND VALSARTAN SCH TAB: 49; 51 TABLET, FILM COATED ORAL at 22:35

## 2020-01-04 RX ADMIN — SACUBITRIL AND VALSARTAN SCH TAB: 49; 51 TABLET, FILM COATED ORAL at 09:53

## 2020-01-04 RX ADMIN — LEVOFLOXACIN SCH MG: 500 TABLET, FILM COATED ORAL at 09:53

## 2020-01-04 RX ADMIN — ACETYLCYSTEINE SCH MG: 200 INHALANT RESPIRATORY (INHALATION) at 01:14

## 2020-01-04 RX ADMIN — DOCUSATE SODIUM SCH: 100 CAPSULE, LIQUID FILLED ORAL at 17:35

## 2020-01-04 RX ADMIN — DILTIAZEM HYDROCHLORIDE SCH MG: 60 TABLET, FILM COATED ORAL at 13:23

## 2020-01-04 RX ADMIN — PREDNISONE SCH MG: 20 TABLET ORAL at 09:53

## 2020-01-04 RX ADMIN — LEVOTHYROXINE SODIUM SCH MG: 50 TABLET ORAL at 05:52

## 2020-01-04 RX ADMIN — DILTIAZEM HYDROCHLORIDE SCH MG: 60 TABLET, FILM COATED ORAL at 05:52

## 2020-01-04 RX ADMIN — PREGABALIN SCH MG: 100 CAPSULE ORAL at 22:36

## 2020-01-04 RX ADMIN — PREGABALIN SCH MG: 100 CAPSULE ORAL at 05:52

## 2020-01-04 RX ADMIN — BUDESONIDE SCH MG: 0.5 SUSPENSION RESPIRATORY (INHALATION) at 20:32

## 2020-01-04 RX ADMIN — LEVALBUTEROL HYDROCHLORIDE PRN MG: 1.25 SOLUTION RESPIRATORY (INHALATION) at 16:26

## 2020-01-04 RX ADMIN — BUDESONIDE SCH MG: 0.5 SUSPENSION RESPIRATORY (INHALATION) at 09:21

## 2020-01-04 RX ADMIN — HYDROCODONE BITARTRATE AND ACETAMINOPHEN PRN TAB: 5; 325 TABLET ORAL at 22:39

## 2020-01-04 RX ADMIN — FAMOTIDINE SCH MG: 20 TABLET, FILM COATED ORAL at 09:53

## 2020-01-04 RX ADMIN — HYDROCODONE BITARTRATE AND ACETAMINOPHEN PRN TAB: 5; 325 TABLET ORAL at 14:38

## 2020-01-04 RX ADMIN — PREGABALIN SCH MG: 100 CAPSULE ORAL at 13:23

## 2020-01-04 RX ADMIN — LEVALBUTEROL HYDROCHLORIDE PRN MG: 1.25 SOLUTION RESPIRATORY (INHALATION) at 09:21

## 2020-01-04 RX ADMIN — INSULIN HUMAN SCH UNIT: 100 INJECTION, SOLUTION PARENTERAL at 12:38

## 2020-01-04 RX ADMIN — ACETYLCYSTEINE SCH MG: 200 INHALANT RESPIRATORY (INHALATION) at 16:26

## 2020-01-04 RX ADMIN — Medication SCH ML: at 13:26

## 2020-01-04 RX ADMIN — PREDNISONE SCH MG: 20 TABLET ORAL at 17:33

## 2020-01-04 RX ADMIN — Medication SCH ML: at 22:37

## 2020-01-04 RX ADMIN — INSULIN HUMAN SCH: 100 INJECTION, SOLUTION PARENTERAL at 07:51

## 2020-01-04 NOTE — PDOC PROGRESS REPORT
Subjective


Progress Note for:: 01/04/20


Subjective:: 


This is a 79-year-old female with chronic respiratory failure, COPD, history of 

PE, congestive heart failure and history of pulmonary fibrosis initially 

presented with increasing shortness of breath.  She was admitted for acute and 

chronic asthma hypoxic respiratory failure multifactorial from CHF exacerbation,

right lobe pneumonia and COPD exacerbation.





On the floor she developed atrial fibrillation with RVR.  She reportedly had 

confusion and there was initial concern for possible CVA.  She was subsequently 

transferred to the ICU.  She was seen by cardiology.  She was also started on 

Cardizem drip.  She was downgraded to the floor yesterday 12/31.





1/1: She was weaned off Cardizem drip overnight.  Upon encounter, she is 

currently saturating well and comfortable on BiPAP.  She denies chest pain.  She

says her shortness of breath continue to improve.  Heart rate running in the 

110s-120s.





1/2: She went into RVR last night and was given IV Cardizem push.  She was also 

started on p.o. Cardizem.  She continues to feel better.  Weaned off BiPAP.  

Saturating well on 3 L via nasal cannula.  She is on 2.5 L of home O2.  She says

her breathing continued to improve.  Heart rate running in the 80s but 

occasionally goes up to 110s when she ambulates.





1/3: She continues to improve and feel better.  She does still have a few 

episodes of getting tachycardic when she ambulates but overall heart rate has 

improved.  Discussed with cardiology and Cardizem PO has been increased to 60 mg

every 8.





1/4: No acute event overnight.  She feels like she is at her baseline.  Denies 

chest pain or shortness of breath.  Heart rate went up to the 130s on 

ambulation.  Cardiology has increased p.o. Cardizem to 90 every 8.  She was also

given digoxin. Anticipate discharge in the next 24 hours once heart rate 

continues to be optimized.


Reason For Visit: 


ACUTE ON CHRONIC DISTOLIC CONGESTIVE HEART FAILURE








Physical Exam


Vital Signs: 


                                        











Temp Pulse Resp BP Pulse Ox


 


 97.5 F   81   18   110/73   100 


 


 01/04/20 11:08  01/04/20 11:08  01/04/20 11:08  01/04/20 11:08  01/04/20 11:08








                                 Intake & Output











 01/03/20 01/04/20 01/05/20





 06:59 06:59 06:59


 


Intake Total 1410 1380 


 


Output Total 950 600 


 


Balance 460 780 


 


Weight 237 lb 7.005 oz 233 lb 3.985 oz 











General appearance: PRESENT: no acute distress, well-developed, well-nourished


Head exam: PRESENT: atraumatic, normocephalic


Eye exam: PRESENT: conjunctiva pink, EOMI, PERRLA.  ABSENT: scleral icterus


Ear exam: PRESENT: normal external ear exam


Mouth exam: PRESENT: moist, tongue midline


Neck exam: ABSENT: carotid bruit, JVD, lymphadenopathy, thyromegaly


Respiratory exam: PRESENT: clear to auscultation renay.  ABSENT: rales, rhonchi, 

wheezes


Cardiovascular exam: PRESENT: irregular rhythm, tachycardia.  ABSENT: systolic 

murmur


Pulses: PRESENT: normal dorsalis pedis pul


GI/Abdominal exam: PRESENT: normal bowel sounds, soft.  ABSENT: distended, 

guarding, mass, organolmegaly, rebound, tenderness


Rectal exam: PRESENT: deferred


Neurological exam: PRESENT: alert, awake, oriented to person, oriented to place,

oriented to time, oriented to situation, CN II-XII grossly intact.  ABSENT: 

motor sensory deficit





Results


Laboratory Results: 


                                        





                                 01/01/20 05:46 





                                 01/03/20 11:30 





                                        





12/30/19 09:40   Blood   Blood Culture - Final


                            NO GROWTH IN 5 DAYS


12/30/19 09:52   Blood   Blood Culture - Final


                            NO GROWTH IN 5 DAYS





                                        











  12/25/19 12/25/19 12/25/19





  20:45 20:45 23:14


 


Creatine Kinase  46  


 


Troponin I   0.025  0.027


 


NT-Pro-B Natriuret Pep   2580 H 














  12/26/19 12/26/19 12/29/19





  05:05 11:08 07:58


 


Creatine Kinase   


 


Troponin I  0.027  0.024  0.015


 


NT-Pro-B Natriuret Pep   











Impressions: 


                                        





Head CT  12/29/19 00:00


IMPRESSION:  No hemorrhage.  9 mm ovoid hyperdensity in the left cerebellar 

hemisphere suggesting lacunar infarction, possibly acute.


EVIDENCE OF ACUTE STROKE: YES.  LEFT VERTEBROBASILAR


 








Brain MRI with MRA  12/30/19 00:00


IMPRESSION:  NORMAL MRA OF THE [place].


 








Carotid Doppler Study  12/30/19 00:00


IMPRESSION:  NO HEMODYNAMICALLY SIGNIFICANT STENOSIS.


 








Chest X-Ray  12/30/19 00:00


IMPRESSION:  STABLE CHRONIC CHANGES.  NO ACUTE RADIOGRAPHIC FINDING IN THE 

CHEST.


 








Head MRI  12/30/19 00:00


IMPRESSION:  OLD LACUNAR INFARCT IN THE LEFT CEREBELLAR HEMISPHERE.  NO OTHER 

SIGNIFICANT OR ACUTE FINDINGS.


EVIDENCE OF ACUTE STROKE: NO.


 








Modified Barium Swallow  12/31/19 00:00


IMPRESSION:  NO EVIDENCE OF PENETRATION OR ASPIRATION. PLEASE SEE SPEECH 

PATHOLOGIST REPORT FOR OTHER FINDINGS AND RECOMMENDATIONS.


 














Assessment and Plan





- Diagnosis


(1) Acute and chronic respiratory failure with hypoxia


Is this a current diagnosis for this admission?: Yes   


Plan: 


Multifactorial from COPD exacerbation, CHF exacerbation and pneumonia.





1/2: Weaned off BiPAP.  Saturating well on 3 L via nasal cannula.  She is on 2.5

L of home O2.  She says her breathing continued to improve.  





1/3: Doing well on home O2 requirement (2.5 L).








(2) Atrial fibrillation


Is this a current diagnosis for this admission?: Yes   


Plan: 


1/1: Wean off Cardizem drip overnight.  Discussed with cardiology.  Increase 

metoprolol to 100 mg every 12.  Tolerating Xarelto well.





1/2: She went into RVR last night and was given IV Cardizem push.  She was also 

started on p.o. Cardizem.  Heart rate running in the 80s but occasionally goes 

up to 110s when she ambulates. Cardiology following. Continue PO cardizem and 

metoprolol.





1/3: Discussed with cardiology and Cardizem PO has been increased to 60 mg every

8.





1/4: Heart rate went up to the 130s on ambulation.  Cardiology has increased 

p.o. Cardizem to 90 every 8.  She was also given digoxin. Anticipate discharge 

in the next 24 hours once heart rate continues to be optimized.








(3) Acute on chronic diastolic congestive heart failure


Is this a current diagnosis for this admission?: Yes   


Plan: 


Echocardiogram shows ejection fraction 30-35%.  On Entresto.  Continue 

metoprolol.








(4) Pneumonia


Is this a current diagnosis for this admission?: Yes   


Plan: 


Continue IV antibiotics.  





1/3: Switch to PO Levaquin.








(5) Chronic obstructive pulmonary disease (COPD)


Qualifiers: 


   COPD type: unspecified COPD   Qualified Code(s): J44.9 - Chronic obstructive 

pulmonary disease, unspecified   


Is this a current diagnosis for this admission?: Yes   


Plan: 


Keep breathing treatments prn due to tachycardia/Afib. 





1/2: Continue prednisone.








- Plan Summary


Summary: 


Patient is admitted to a telemetry bed where she will receive usual supportive 

and symptomatic cares.  She will be treated with IV Bumex 1 mg every 6 hours to 

help resolve her acute congestive heart failure.  She will also receive 

supplemental oxygen utilizing nasal cannula and or noninvasive airway pressure 

support devices such as CPAP or BiPAP to obtain an adequate oxygen saturation 

and avoid hypercapnia.  She will receive morphine sulfate 2 mg IV every 1 hour 

PRN severe dyspnea.  She will receive a pulmonary toilet utilizing Atrovent and 

Pulmicort and will be maintained on antibiotic therapy for possible 

pneumonia/pneumonitis with Levaquin 750 mg p.o. daily x4 days.  Patient will be 

continued on her usual home medications as soon as her medication reconciliation

has been verified and completed.  A nutritionist consultation will be obtained 

to  the patient on her obesity and possible diet interventions.  Patient 

will be continued on her cardiac and diabetic restricted diet.  Before meals and

at bedtime Accu-Cheks will be obtained with sliding scale insulin used for 

control of hyperglycemia and a hypoglycemic protocol in place.





- Time


Time Spent with patient: 25-34 minutes

## 2020-01-04 NOTE — PROGRESS NOTE
Provider Note


Provider Note: 


CARDIOLOGY PROGRESS NOTE by Dr. Nuvia Ramos on 1/4/2020.





OBJECTIVE: The patient denies any shortness of breath or chest pain.  There is 

no PND orthopnea.  She still has a cough but unable to produce any mucus or 

sputum.  She states yesterday she coughed up a glob of sputum.  There is no 

wheezing.  The patient's heart rate when she went to the bathroom and came back 

went into 130s and stayed in the 130 bpm with the patient still being in atrial 

flutter.  The patient was given 0.125 mg of digoxin intravenously for 1 dose.  

Subsequently the patient was given Cardizem 30 mg p.o. x1 dose and Cardizem has 

been increased to 90 mg p.o. every 8 hours.  Will later transition this to a 

long-acting preparation.  There is no bleeding on Xarelto.  There is no TIA CVA 

symptoms.  There is no ventricular arrhythmias seen.





PHYSICAL EXAMINATION: The patient is morbidly obese.  In no acute distress.


                                                                Selected Entries











  01/04/20





  15:03


 


Temperature 97.7 F


 


Temperature Oral





Source 


 


Pulse Rate 78


 


Respiratory 17





Rate 


 


Blood Pressure 119/62


 


Blood Pressure 81





Mean 


 


BP Location Right Arm


 


BP Position Supine


 


O2 Sat by Pulse 100





Oximetry 


 


Oxygen Flow 2.00





Rate 


 


Oxygen Delivery Nasal Cannula





Method 





HEAD: Is atraumatic.  Normocephalic.  EYES: Pupils are equal round regular react

rafi to light and accommodation.  Extraocular movements are normal there is no 

conjunctival pallor.  There is no scleral icterus.  EARS: Tympanic membranes are

intact.  External auditory canals are clear.  NOSE: There is no deviated nasal 

septum.  There is no inflammation of the nasal mucous membrane.  MOUTH: There is

no ulcers in the mouth.  Mucous membranes of the mouth are moist.  THROAT: There

is no redness of the oropharynx.  There is no exudates.  SKIN: There is no skin 

rashes.  There is no petechia or ecchymosis.  NECK: Is supple.  There is mild 

JVD present.  Carotids are equal there is no bruit.  There is no 

lymphadenopathy.  There is no goiter.  There is no accessory muscle respiration 

use.  Trachea is central.  LUNGS: Shows diminished air entry prolonged 

expiration.  There is scattered end expiratory wheezing and rhonchi.  There is 

"Velcro" [E] rales of pulmonary fibrosis bilaterally in the bases.  There is a 

few fine rales of CHF.  HEART: S1-S2 is heard.  There is no S3 gallop.  There is

 no S4 gallop.  There is systolic murmur of tricuspid regurgitation and mild 

mitral regurgitation murmur present.  There is no S3 gallop.  There is no S4 

gallop.  There is no rub.  ABDOMEN: Is obese.  Nontender.  There is no 

paraspinal megaly.  Bowel sounds are well heard.  EXTREMITIES: Femorals are 

deep.  Femorals are diminished there is no femoral bruits.  Leg pulses are 

diminished.  There is 1+ bilateral edema.  There is no DVT or cellulitis.  There

 is no cyanosis or clubbing.  There is no calf tenderness.  CNS: The patient is 

conscious awake alert oriented x3 with no focal deficit.  PSYCHIATRIC:.  The 

patient judgment insight are intact her affect is normal.


                              Labs- All tests 24 hr











  01/03/20 01/04/20 01/04/20





  21:07 07:38 11:11


 


POC Glucose  226 H  143 H  193 H














  01/04/20





  15:05


 


POC Glucose  265 H








                                        





Chest X-Ray  12/25/19 19:52


IMPRESSION:


 


Equivocal right basilar airspace opacity. There is elevation


right hemidiaphragm. Cardiac megaly


 


 


copyright 2011 [employer]- All Rights Reserved


 








Chest X-Ray  12/29/19 00:00


IMPRESSION:  Chronic lung changes, stable appearance.


 








Head CT  12/29/19 00:00


IMPRESSION:  No hemorrhage.  9 mm ovoid hyperdensity in the left cerebellar 

hemisphere suggesting lacunar infarction, possibly acute.


EVIDENCE OF ACUTE STROKE: YES.  LEFT VERTEBROBASILAR


 








Brain MRI with MRA  12/30/19 00:00


IMPRESSION:  NORMAL MRA OF THE [place].


 








Carotid Doppler Study  12/30/19 00:00


IMPRESSION:  NO HEMODYNAMICALLY SIGNIFICANT STENOSIS.


 








Chest X-Ray  12/30/19 00:00


IMPRESSION:  STABLE CHRONIC CHANGES.  NO ACUTE RADIOGRAPHIC FINDING IN THE 

CHEST.


 








Head MRI  12/30/19 00:00


IMPRESSION:  OLD LACUNAR INFARCT IN THE LEFT CEREBELLAR HEMISPHERE.  NO OTHER 

SIGNIFICANT OR ACUTE FINDINGS.


EVIDENCE OF ACUTE STROKE: NO.


 








Modified Barium Swallow  12/31/19 00:00


IMPRESSION:  NO EVIDENCE OF PENETRATION OR ASPIRATION. PLEASE SEE SPEECH 

PATHOLOGIST REPORT FOR OTHER FINDINGS AND RECOMMENDATIONS.


 








IMPRESSION/RECOMMENDATION:





1.  Acute lacunar infarct by CT scan of the head.  But MRI shows that there is 

old lacunar infarct.  The patient has no signs or symptoms of a CVA.  


2.Acute on chronic respiratory failure.  Continue supplemental oxygen, and 

current anti-COPD medication.  This is improved.


3.  Acute infective bronchitis/possible right lower lobe pneumonia: Continue 

current treatment including antibiotics.


4.  Intermittent altered sensorium with somnolence since most likely secondary 

to hypercapnia, but would also recommend getting the patient a CT of the head to

 make sure there is no acute intracranial pathology.  This is resolved.


5.  Most likely acute on chronic systolic heart failure: Continue diuretics, in 

view of the patient's current current wheezing will not start the patient on 

Toprol-XL at present.  We will start the patient on Entresto.  If tachycardia 

persists will give 1 dose of digoxin.  The patient still has some wheezing and 

hence we will start not start Toprol-XL will try from tomorrow.


6.  Pulmonary fibrosis.?  Right lower lobe pneumonia.  We will repeat the 

patient's chest x-ray in a.m.


7.  History of asthma and COPD.


8.  Hypertension: Continue current medication.  Increase Entresto as tolerated. 

 Later would add a beta-blocker.


9. Atrial flutter.  There is also right bundle branch block pattern and left 

anterior fascicular block.  [Bifascicular block].  This raises the possibility 

of post tachycardia induced cardiomyopathy.  Will increase the patient's Toprol-

XL to 100 mg p.o. every 12 hours.  In view of the cardiomyopathy we will 

continue patient on Entresto.    The patient's Cardizem drip has been 

discontinued.  The patient Toprol will be increased 200 mg p.o. every 12 hours. 

Once heart rate is adequately controlled for a period of time, then would repeat

the patient's echocardiogram to see if indeed the patient's cardiomyopathy is 

secondary to tachycardia induced cardiomyopathy.  This has been discussed with 

the patient.  Since the patient's heart rate does go up will increase the 

patient's Cardizem to 90 mg p.o. every 8 hours.  From tomorrow will change to 

Cardizem  mg p.o. every 12 hours.


10.  Diabetes mellitus type 2 non-insulin-dependent.


11.  Hypothyroidism:: Continue thyroid replacement.  Note the patient's repeat 

TSH is normal, and free T3 and free T4 are normal...  


12 Moderate to severe tricuspid regurgitation.  At least moderate pulmonary 

hypertension with right ventricle systolic pressure of 53 to 58 mmHg.


13.  Cardiomyopathy with moderate to severely reduced ejection fraction, most 

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  But will reassess.  We 

will treat the patient with beta-blocker and Entresto.  Will increase the 

patient's Entresto.  


14.  With history of pulmonary embolism: Patient states she is on Xarelto.  Will

continue Xarelto.  


15.  History of breast cancer status post bilateral mastectomies and 

chemotherapy and radiation treatment.


16.  History of multiple abdominal surgeries.


17.  Morbid obesity.





Patient is anxious to go home.  We will see if we can discharge the patient 

tomorrow subjective heart rate being controlled.





Occasions reviewed.  Medical regimen adjusted with increasing his dosages of 

medications.  Medical regimen and management plan discussed with the attending 

provider on the case.  Discussed the plan with the patient and patient's family.

 Medical decision making is of high complexity.  40 minutes spent as patient 

with more than 50% time spent in direct patient care.

## 2020-01-05 VITALS — SYSTOLIC BLOOD PRESSURE: 110 MMHG | DIASTOLIC BLOOD PRESSURE: 59 MMHG

## 2020-01-05 RX ADMIN — PREDNISONE SCH MG: 20 TABLET ORAL at 10:22

## 2020-01-05 RX ADMIN — LEVALBUTEROL HYDROCHLORIDE PRN MG: 1.25 SOLUTION RESPIRATORY (INHALATION) at 07:52

## 2020-01-05 RX ADMIN — INSULIN HUMAN SCH UNIT: 100 INJECTION, SOLUTION PARENTERAL at 11:56

## 2020-01-05 RX ADMIN — Medication SCH ML: at 06:13

## 2020-01-05 RX ADMIN — PREGABALIN SCH MG: 100 CAPSULE ORAL at 13:04

## 2020-01-05 RX ADMIN — LEVALBUTEROL HYDROCHLORIDE PRN MG: 1.25 SOLUTION RESPIRATORY (INHALATION) at 01:06

## 2020-01-05 RX ADMIN — DOCUSATE SODIUM SCH MG: 100 CAPSULE, LIQUID FILLED ORAL at 10:23

## 2020-01-05 RX ADMIN — ACETYLCYSTEINE SCH MG: 200 INHALANT RESPIRATORY (INHALATION) at 01:06

## 2020-01-05 RX ADMIN — SACUBITRIL AND VALSARTAN SCH TAB: 49; 51 TABLET, FILM COATED ORAL at 10:22

## 2020-01-05 RX ADMIN — BUDESONIDE SCH MG: 0.5 SUSPENSION RESPIRATORY (INHALATION) at 07:52

## 2020-01-05 RX ADMIN — DILTIAZEM HYDROCHLORIDE SCH MG: 90 TABLET ORAL at 06:13

## 2020-01-05 RX ADMIN — LEVOFLOXACIN SCH MG: 500 TABLET, FILM COATED ORAL at 10:23

## 2020-01-05 RX ADMIN — ACETYLCYSTEINE SCH MG: 200 INHALANT RESPIRATORY (INHALATION) at 07:52

## 2020-01-05 RX ADMIN — PREGABALIN SCH MG: 100 CAPSULE ORAL at 06:13

## 2020-01-05 RX ADMIN — INSULIN HUMAN SCH UNIT: 100 INJECTION, SOLUTION PARENTERAL at 08:24

## 2020-01-05 RX ADMIN — LEVOTHYROXINE SODIUM SCH MG: 50 TABLET ORAL at 06:13

## 2020-01-05 RX ADMIN — FAMOTIDINE SCH MG: 20 TABLET, FILM COATED ORAL at 10:23

## 2020-01-05 RX ADMIN — Medication SCH ML: at 13:08

## 2020-01-05 RX ADMIN — DILTIAZEM HYDROCHLORIDE SCH MG: 90 TABLET ORAL at 13:04

## 2020-01-05 RX ADMIN — METOPROLOL SUCCINATE SCH MG: 50 TABLET, EXTENDED RELEASE ORAL at 10:23

## 2020-01-05 NOTE — PDOC DISCHARGE SUMMARY
Impression





- Admit/DC Date/PCP


Admission Date/Primary Care Provider: 


  12/25/19 22:14





  LESLIE MABRY MD





Discharge Date: 01/05/20





- Discharge Diagnosis


(1) Acute and chronic respiratory failure with hypoxia


Is this a current diagnosis for this admission?: Yes   





(2) Atrial fibrillation


Is this a current diagnosis for this admission?: Yes   





(3) Acute on chronic diastolic congestive heart failure


Is this a current diagnosis for this admission?: Yes   





(4) Pneumonia


Is this a current diagnosis for this admission?: Yes   





(5) Chronic obstructive pulmonary disease (COPD)


Is this a current diagnosis for this admission?: Yes   





- Additional Information


Resuscitation Status: Full Code


Discharge Diet: Cardiac, Diabetic


Discharge Activity: Activity As Tolerated, Balance Activity w/Rest, Weigh Daily


Referrals: 


ROBBY DUBON MD [ACTIVE STAFF] - 


GALDINO MORROW MD [COMMUNITY BASED STAFF] - 03/12/20 3:20 pm


LESLIE MABRY MD [Primary Care Provider] - 01/16/20 1:00 pm


()


YOVANA FIGUEROA MD [ACTIVE STAFF] - 


Prescriptions: 


Diltiazem HCl [Cardizem 90 mg Tablet] 90 mg PO Q8 #90 tablet


Prednisone [Deltasone 10 mg Tablet] 10 mg PO BID 3 Days #6 tablet


Sacubitril/Valsartan [Entresto 49 mg/51 mg Tablet] 1 tab PO Q12 #60 tablet


Levofloxacin [Levaquin 500 mg Tablet] 500 mg PO DAILY 3 Days #3 tablet


Metoprolol Succinate [Toprol Xl 50 mg Tab.sr] 100 mg PO Q12 #120 tab.sr.24h


Home Medications: 








Fluticasone/Umeclidin/Vilanter [Trelegy 100-62.5-25 Mcg Ellipta 14 Dose/Dpi] 1 

puff IH DAILY 12/26/19 


Furosemide [Lasix 20 mg Tablet] 20 mg PO QAM 12/26/19 


Glimepiride 2 mg PO DAILY 12/26/19 


Hydrocodone/Acetaminophen [Norco 5-325 Tablet] 1 each PO Q6HP PRN 12/26/19 


Levothyroxine Sodium [Synthroid 0.025 mg Tablet] 25 mcg PO DAILY 12/26/19 


Potassium Chloride [Klor-Con M20] 20 meq PO DAILY 12/26/19 


Pregabalin [Lyrica 100 mg Capsule] 100 mg PO TID 12/26/19 


Rivaroxaban [Xarelto] 20 mg PO DAILY 12/26/19 


Diltiazem HCl [Cardizem 90 mg Tablet] 90 mg PO Q8 #90 tablet 01/05/20 


Levofloxacin [Levaquin 500 mg Tablet] 500 mg PO DAILY 3 Days #3 tablet 01/05/20 


Metoprolol Succinate [Toprol Xl 50 mg Tab.sr] 100 mg PO Q12 #120 tab.sr.24h 

01/05/20 


Prednisone [Deltasone 10 mg Tablet] 10 mg PO BID 3 Days #6 tablet 01/05/20 


Sacubitril/Valsartan [Entresto 49 mg/51 mg Tablet] 1 tab PO Q12 #60 tablet 

01/05/20 











History of Present Illiness


History of Present Illness: 


Admitting hospitalist's H&P:





SHAYAN BUSH is a 79 year old female who presented to the emergency room with 

a one-week history of dyspnea.  Patient admits gradually worsening dyspnea over 

the last 7 days with associated increase and bilateral lower extremity edema and

significant worsening of her dyspnea with exertion.  Her dyspnea became severe 

today causing her to present to the emergency room.  She has been using her 

prescribed inhalers and continuous oxygen at home at 3 L/min via nasal cannula, 

without improvement.  She also admits a chronic cough which has been 

occasionally productive of small amounts of greenish mucus.  She denies other 

associated or accompanying signs and symptoms.  She admits prior similar 

episodes related to her COPD and congestive heart failure.  She has not 

identified any additional aggravating or ameliorating factors for her dyspnea.  

In the emergency room she was found to be hypoxic with a O2 sat of 86 on her 

usual oxygen at 3 L/min via nasal cannula.  Her chest x-ray showed evidence of a

right basilar opacity with an elevated right hemidiaphragm.  Clinically the 

patient appears to have acute congestive heart failure based on her examination 

by the ER physician.  He treated her with IV Lasix and also initiated antibiotic

therapy for a possible pneumonia.  Patient was subsequently admitted to the 

hospital for further evaluation and treatment.





Hospital Course


Hospital Course: 


This is a 79-year-old female with chronic respiratory failure, COPD, history of 

PE, congestive heart failure and history of pulmonary fibrosis initially 

presented with increasing shortness of breath.  She was admitted for acute and 

chronic asthma hypoxic respiratory failure multifactorial from CHF exacerbation,

right lobe pneumonia and COPD exacerbation.





She was started on IV antibiotics and breathing treatments.





On the floor she developed atrial fibrillation with RVR.  She reportedly had 

confusion and there was initial concern for possible CVA.  She was subsequently 

transferred to the ICU.  She was seen by cardiology.  She was also started on 

Cardizem drip.  Hed MRI only showed an old lacunar infarct and the initial acute

CVA was likely just overread.  She was downgraded to the floor on 12/31.





She was eventually weaned off Cardizem drip.  Her rate controlling regimen was 

closely adjusted per cardiology recommendations.  Her oral metoprolol was 

increased.  Subsequently Cardizem oral was also added and was also increased per

cardiology.  Her A. flutter was eventually controlled and rate optimized. She 

returned to her baseline.





She will closely follow-up with Dr. Ruiz for possible stress testing and 30-day 

event monitor.





Physical Exam


Vital Signs: 


                                        











Temp Pulse Resp BP Pulse Ox


 


 97.9 F   88   17   110/59 L  97 


 


 01/05/20 13:26  01/05/20 13:26  01/05/20 13:26  01/05/20 13:26  01/05/20 13:26








                                 Intake & Output











 01/04/20 01/05/20 01/06/20





 06:59 06:59 06:59


 


Intake Total 1380 600 720


 


Output Total 600 0 0


 


Balance 780 600 720


 


Weight 233 lb 3.985 oz 231 lb 11.293 oz 











General appearance: PRESENT: no acute distress, well-developed, well-nourished


Head exam: PRESENT: atraumatic, normocephalic


Eye exam: PRESENT: conjunctiva pink, EOMI, PERRLA.  ABSENT: scleral icterus


Ear exam: PRESENT: normal external ear exam


Mouth exam: PRESENT: moist, tongue midline


Neck exam: ABSENT: carotid bruit, JVD, lymphadenopathy, thyromegaly


Respiratory exam: PRESENT: clear to auscultation renay.  ABSENT: rales, rhonchi, 

wheezes


Cardiovascular exam: PRESENT: irregular rhythm.  ABSENT: diastolic murmur, rubs,

systolic murmur


Pulses: PRESENT: normal dorsalis pedis pul


GI/Abdominal exam: PRESENT: normal bowel sounds, soft.  ABSENT: distended, 

guarding, mass, organolmegaly, rebound, tenderness


Rectal exam: PRESENT: deferred


Extremities exam: PRESENT: full ROM.  ABSENT: calf tenderness, clubbing, pedal 

edema


Neurological exam: PRESENT: alert, awake, oriented to person, oriented to place,

oriented to time, oriented to situation, CN II-XII grossly intact.  ABSENT: 

motor sensory deficit





Results


Laboratory Results: 


                                        











WBC  10.0 10^3/uL (4.0-10.5)   01/01/20  05:46    


 


RBC  4.10 10^6/uL (3.72-5.28)   01/01/20  05:46    


 


Hgb  13.2 g/dL (12.0-15.5)   01/01/20  05:46    


 


Hct  38.2 % (36.0-47.0)   01/01/20  05:46    


 


MCV  93 fl (80-97)   01/01/20  05:46    


 


MCH  32.1 pg (27.0-33.4)   01/01/20  05:46    


 


MCHC  34.4 g/dL (32.0-36.0)   01/01/20  05:46    


 


RDW  14.6 % (11.5-14.0)  H  01/01/20  05:46    


 


Plt Count  163 10^3/uL (150-450)   01/01/20  05:46    


 


Lymph % (Auto)  Not Reportable   01/01/20  05:46    


 


Mono % (Auto)  Not Reportable   01/01/20  05:46    


 


Eos % (Auto)  Not Reportable   01/01/20  05:46    


 


Baso % (Auto)  Not Reportable   01/01/20  05:46    


 


Absolute Neuts (auto)  Not Reportable   01/01/20  05:46    


 


Absolute Lymphs (auto)  Not Reportable   01/01/20  05:46    


 


Absolute Monos (auto)  Not Reportable   01/01/20  05:46    


 


Absolute Eos (auto)  Not Reportable   01/01/20  05:46    


 


Absolute Basos (auto)  Not Reportable   01/01/20  05:46    


 


Total Counted  100   01/01/20  05:46    


 


Seg Neutrophils %  Not Reportable   01/01/20  05:46    


 


Seg Neuts % (Manual)  95 % (42-78)  H  01/01/20  05:46    


 


Band Neutrophils %  1 % (3-5)  L  12/31/19  04:11    


 


Lymphocytes % (Manual)  5 % (13-45)  L  01/01/20  05:46    


 


Monocytes % (Manual)  0 % (3-13)  L  01/01/20  05:46    


 


Eosinophils % (Manual)  0 % (0-6)   01/01/20  05:46    


 


Basophils % (Manual)  0 % (0-2)   01/01/20  05:46    


 


Abs Neuts (Manual)  9.5 10^3/uL (1.7-8.2)  H  01/01/20  05:46    


 


Abs Lymphs (Manual)  0.5 10^3/uL (0.5-4.7)   01/01/20  05:46    


 


Abs Monocytes (Manual)  0.0 10^3/uL (0.1-1.4)  L  01/01/20  05:46    


 


Absolute Eos (Manual)  0.0 10^3/uL (0.0-0.6)   01/01/20  05:46    


 


Abs Basophils (Manual)  0.0 10^3/uL (0.0-0.2)   01/01/20  05:46    


 


Toxic Granulation  SLIGHT   01/01/20  05:46    


 


Platelet Comment  ADEQUATE   01/01/20  05:46    


 


Poikilocytosis  SLIGHT   01/01/20  05:46    


 


Anisocytosis  SLIGHT   01/01/20  05:46    


 


Ovalocytes  SLIGHT   01/01/20  05:46    


 


Schistocytes  SLIGHT   01/01/20  05:46    


 


RBC Morph Comment  NORMO-CYTIC/CHROMIC   12/30/19  03:52    


 


Carbonic Acid  1.89 mmol/L (1.05-1.35)  H  12/30/19  04:55    


 


HCO3/H2CO3 Ratio  17:1   12/30/19  04:55    


 


ABG pH  7.34  (7.35-7.45)  L  12/30/19  04:55    


 


ABG pCO2  62.7 mmHg (35-45)  H  12/30/19  04:55    


 


ABG pO2  83.9 mmHg ()   12/30/19  04:55    


 


ABG HCO3  33.3 mmol/L (20-24)  H  12/30/19  04:55    


 


ABG Total CO2  35.2 mmol/L (21-25)  H  12/30/19  04:55    


 


ABG O2 Saturation  95.5 % (94-98)   12/30/19  04:55    


 


ABG Base Excess  5.7 mmol/L  12/30/19  04:55    


 


VBG pH  7.29  (7.30-7.42)  L  12/29/19  14:30    


 


VBG pCO2  85.8 mmHg (35-63)  H*  12/29/19  14:30    


 


VBG HCO3  40.7 mmol/L (20-32)  H  12/29/19  14:30    


 


VBG Base Excess  10.6 mmol/L  12/29/19  14:30    


 


FiO2  3L   12/30/19  04:55    


 


Sodium  138.0 mmol/L (137-145)   01/01/20  05:46    


 


Potassium  4.3 mmol/L (3.6-5.0)   01/01/20  05:46    


 


Chloride  96 mmol/L ()  L  01/01/20  05:46    


 


Carbon Dioxide  36 mmol/L (22-30)  H  01/01/20  05:46    


 


Anion Gap  6  (5-19)   01/01/20  05:46    


 


BUN  44 mg/dL (7-20)  H  01/01/20  05:46    


 


Creatinine  0.74 mg/dL (0.52-1.25)   01/03/20  11:30    


 


Est GFR ( Amer)  > 60  (>60)   01/03/20  11:30    


 


Est GFR (MDRD) Non-Af  > 60  (>60)   01/03/20  11:30    


 


Glucose  180 mg/dL ()  H  01/01/20  05:46    


 


POC Glucose  203 mg/dL ()  H  01/05/20  11:21    


 


Hemoglobin A1c %  5.9 % (4.7-6.0)   12/27/19  04:51    


 


Lactic Acid  0.5 mmol/L (0.7-2.1)  L  12/25/19  21:58    


 


Calcium  8.6 mg/dL (8.4-10.2)   01/01/20  05:46    


 


Magnesium  1.7 mg/dL (1.6-2.3)   12/29/19  07:58    


 


Total Bilirubin  0.4 mg/dL (0.2-1.3)   12/25/19  20:45    


 


Direct Bilirubin  0.1 mg/dL (0.0-0.4)   12/25/19  20:45    


 


Neonat Total Bilirubin  Not Reportable   12/25/19  20:45    


 


Neonat Direct Bilirubin  Not Reportable   12/25/19  20:45    


 


Neonat Indirect Bili  Not Reportable   12/25/19  20:45    


 


AST  22 U/L (14-36)   12/25/19  20:45    


 


ALT  17 U/L (<35)   12/25/19  20:45    


 


Alkaline Phosphatase  87 U/L ()   12/25/19  20:45    


 


Creatine Kinase  46 U/L ()   12/25/19  20:45    


 


Troponin I  0.015 ng/mL  12/29/19  07:58    


 


NT-Pro-B Natriuret Pep  2580 pg/mL (<450)  H  12/25/19  20:45    


 


Total Protein  6.6 g/dL (6.3-8.2)   12/25/19  20:45    


 


Albumin  3.6 g/dL (3.5-5.0)   12/25/19  20:45    


 


Triglycerides  78 mg/dL (<150)   12/26/19  05:05    


 


Cholesterol  117.26 mg/dL (0-200)   12/26/19  05:05    


 


LDL Cholesterol Direct  52 mg/dL (<100)   12/26/19  05:05    


 


VLDL Cholesterol  16.0 mg/dL (10-31)   12/26/19  05:05    


 


HDL Cholesterol  48 mg/dL (>40)   12/26/19  05:05    


 


TSH  2.27 uIU/mL (0.47-4.68)   12/28/19  04:38    


 


Free T4  1.03 ng/dL (0.78-2.19)   12/28/19  04:38    


 


Free T3 pg/mL  1.49 pg/mL (2.77-5.27)  L  12/28/19  04:38    


 


Time Trough Drawn  1130   01/03/20  11:30    


 


Vancomycin Trough  6.2 ug/mL (5.0-20.0)   01/03/20  11:30    


 


Digoxin  0.59 ng/mL (0.8-2.0)  L  12/31/19  04:11    








                                        











  12/25/19 12/25/19 12/26/19





  20:45 23:14 05:05


 


Troponin I  0.025  0.027  0.027


 


NT-Pro-B Natriuret Pep  2580 H  














  12/26/19 12/29/19





  11:08 07:58


 


Troponin I  0.024  0.015


 


NT-Pro-B Natriuret Pep  











Impressions: 


                                        





Chest X-Ray  12/25/19 19:52


IMPRESSION:


 


Equivocal right basilar airspace opacity. There is elevation


right hemidiaphragm. Cardiac megaly


 


 


copyright 2011 [employer]- All Rights Reserved


 








Chest X-Ray  12/29/19 00:00


IMPRESSION:  Chronic lung changes, stable appearance.


 








Head CT  12/29/19 00:00


IMPRESSION:  No hemorrhage.  9 mm ovoid hyperdensity in the left cerebellar 

hemisphere suggesting lacunar infarction, possibly acute.


EVIDENCE OF ACUTE STROKE: YES.  LEFT VERTEBROBASILAR


 








Brain MRI with MRA  12/30/19 00:00


IMPRESSION:  NORMAL MRA OF THE [place].


 








Carotid Doppler Study  12/30/19 00:00


IMPRESSION:  NO HEMODYNAMICALLY SIGNIFICANT STENOSIS.


 








Chest X-Ray  12/30/19 00:00


IMPRESSION:  STABLE CHRONIC CHANGES.  NO ACUTE RADIOGRAPHIC FINDING IN THE 

CHEST.


 








Head MRI  12/30/19 00:00


IMPRESSION:  OLD LACUNAR INFARCT IN THE LEFT CEREBELLAR HEMISPHERE.  NO OTHER 

SIGNIFICANT OR ACUTE FINDINGS.


EVIDENCE OF ACUTE STROKE: NO.


 








Modified Barium Swallow  12/31/19 00:00


IMPRESSION:  NO EVIDENCE OF PENETRATION OR ASPIRATION. PLEASE SEE SPEECH 

PATHOLOGIST REPORT FOR OTHER FINDINGS AND RECOMMENDATIONS.


 














Plan


Goals: 


[facility] staff will call you Monday 1/6/2020 with an appointment date and time with 

, cardiology.





Stroke


Is this a Stroke Patient?: No





Acute Heart Failure





- **


Is this a Heart Failure Patient?: Yes


Documentation of LVEF assessment?: Yes


LVEF < 40%?: Yes-if yes answer questions a through e


a) Discharged on ACEI?: N/A Discharged on ARNI


b) Discharges on ARB?: N/A-Discharged on ARNI


c) Discharged on ARNI?: Yes


d) Discharged on evidence-based Beta blocker(carvedilol, sustained release 

metoprolol succinate, or bisoprolol)?: Yes


e) For LVEF <35%, discharged on Aldosterone antagonist?: No-document 

contraincations

## 2020-01-05 NOTE — PROGRESS NOTE
Provider Note


Provider Note: 





CARDIOLOGY PROGRESS NOTE by Dr. Nuvia Abdul on 1/5/2020.





SUBJECTIVE: The patient heart rate is well controlled.  She is on metoprolol 100

mg p.o. twice daily and also she is on Cardizem 90 mg p.o. every 6 hours.  She 

denies any chest pain or discomfort.  There is no shortness of breath.  There is

no PND orthopnea.  She continues to be in atrial flutter with controlled 

ventricular response.  She is on Xarelto with no bleeding complications.  There 

is no TIA CVA symptoms.  There is no leg edema.  There is no ventricular 

arrhythmia seen on the monitor.  There is no high-grade AV blocks.





PHYSICAL EXAMINATION: The patient is morbidly obese.  In no acute distress.


                                                                Selected Entries











  01/05/20 01/05/20





  11:10 13:26


 


Temperature  97.9 F


 


Pulse Rate  88


 


Respiratory  17





Rate  


 


Blood Pressure  109/70





[Left Lower Arm  





]  


 


Blood Pressure  110/59 L





[Left Upper Arm  





]  


 


O2 Sat by Pulse  97





Oximetry  


 


Oxygen Flow 2.00 





Rate  


 


Oxygen Delivery Nasal Cannula 





Method  





HEAD: Is atraumatic.  Normocephalic.  EYES: Pupils are equal round regular 

reactive to light and accommodation.  Extraocular movements are normal there is 

no conjunctival pallor.  There is no scleral icterus.  EARS: Tympanic membranes 

are intact.  External auditory canals are clear.  NOSE: There is no deviated 

nasal septum.  There is no inflammation of the nasal mucous membrane.  MOUTH: 

There is no ulcers in the mouth.  Mucous membranes of the mouth are moist.  

THROAT: There is no redness of the oropharynx.  There is no exudates.  SKIN: 

There is no skin rashes.  There is no petechia or ecchymosis.  NECK: Is supple. 

There is mild JVD present.  Carotids are equal there is no bruit.  There is no 

lymphadenopathy.  There is no goiter.  There is no accessory muscle respiration 

use.  Trachea is central.  LUNGS: Shows diminished air entry prolonged 

expiration.  There is no expiratory wheezing or rhonchi.  There is "Velcro" [E] 

rales of pulmonary fibrosis bilaterally in the bases.  These are much less than 

prior examinations.  There is a few fine rales of C a few HF.  HEART: S1-S2 is 

heard.  There is no S3 gallop.  There is no S4 gallop.  There is systolic murmur

 of tricuspid regurgitation and mild mitral regurgitation murmur present.  There

 is no S3 gallop.  There is no S4 gallop.  There is no rub.  ABDOMEN: Is obese. 

 Nontender.  There is no paraspinal megaly.  Bowel sounds are well heard.  

EXTREMITIES: Femorals are deep.  Femorals are diminished there is no femoral 

bruits.  Leg pulses are diminished.  There is 1+ bilateral edema.  There is no 

DVT or cellulitis.  There is no cyanosis or clubbing.  There is no calf 

tenderness.  CNS: The patient is conscious awake alert oriented x3 with no focal

 deficit.  PSYCHIATRIC:.  The patient judgment insight are intact her affect is 

normal.





The patient's limited echocardiogram, only parasternal views obtained for the LV

 function but in the these limited views the LV ejection fraction seems to be of

 improved to 45%.  There is moderate tricuspid regurgitation.  With mild to 

moderate pulmonary hypertension, with right ventricle systolic pressure of 45 to

 50 mmHg.  Unfortunately the IVC has not been visualized.  The right ventricle 

appears to be enlarged.  This has been discussed with the patient and patient's 

granddaughter and, and Dr. Romero the hospitalist attending physician.


                              Labs- All tests 24 hr











  01/04/20 01/04/20 01/05/20





  15:05 21:21 07:51


 


POC Glucose  265 H  248 H  153 H














  01/05/20





  11:21


 


POC Glucose  203 H








                                        





Chest X-Ray  12/25/19 19:52


IMPRESSION:


 


Equivocal right basilar airspace opacity. There is elevation


right hemidiaphragm. Cardiac megaly


 


 


copyright 2011 [employer]- All Rights Reserved


 








Chest X-Ray  12/29/19 00:00


IMPRESSION:  Chronic lung changes, stable appearance.


 








Head CT  12/29/19 00:00


IMPRESSION:  No hemorrhage.  9 mm ovoid hyperdensity in the left cerebellar 

hemisphere suggesting lacunar infarction, possibly acute.


EVIDENCE OF ACUTE STROKE: YES.  LEFT VERTEBROBASILAR


 








Brain MRI with MRA  12/30/19 00:00


IMPRESSION:  NORMAL MRA OF THE [place] OF [place].


 








Carotid Doppler Study  12/30/19 00:00


IMPRESSION:  NO HEMODYNAMICALLY SIGNIFICANT STENOSIS.


 








Chest X-Ray  12/30/19 00:00


IMPRESSION:  STABLE CHRONIC CHANGES.  NO ACUTE RADIOGRAPHIC FINDING IN THE 

CHEST.


 








Head MRI  12/30/19 00:00


IMPRESSION:  OLD LACUNAR INFARCT IN THE LEFT CEREBELLAR HEMISPHERE.  NO OTHER 

SIGNIFICANT OR ACUTE FINDINGS.


EVIDENCE OF ACUTE STROKE: NO.


 








Modified Barium Swallow  12/31/19 00:00


IMPRESSION:  NO EVIDENCE OF PENETRATION OR ASPIRATION. PLEASE SEE SPEECH 

PATHOLOGIST REPORT FOR OTHER FINDINGS AND RECOMMENDATIONS.


 


IMPRESSION/RECOMMENDATION:





1.  Old cerebrovascular accident, with no residual effects.  Acute lacunar 

infarct by CT scan of the head.  But MRI shows that there is old lacunar 

infarct.  The patient has no signs or symptoms of a CVA.  


2.Acute on chronic respiratory failure.  Continue supplemental oxygen, and 

current anti-COPD medication.  This is improved.


3.  Acute infective bronchitis/possible right lower lobe pneumonia: Continue 

current treatment including antibiotics.


4.  Intermittent altered sensorium with somnolence since most likely secondary 

to hypercapnia, but would also recommend getting the patient a CT of the head to

 make sure there is no acute intracranial pathology.  This is resolved.


5.  Most likely acute on chronic systolic heart failure: Continue diuretics, in 

view of the patient's current current wheezing will not start the patient on 

Toprol-XL at present.  We will start the patient on Entresto.  If tachycardia 

persists will give 1 dose of digoxin.  The patient still has some wheezing and 

hence we will start not start Toprol-XL will try from tomorrow.


6.  Pulmonary fibrosis.?  Right lower lobe pneumonia.  We will repeat the 

patient's chest x-ray in a.m.


7.  History of asthma and COPD.


8.  Hypertension: Continue current medication.  Increase Entresto as tolerated. 

 Later would add a beta-blocker.


9. Atrial flutter.  There is also right bundle branch block pattern and left 

anterior fascicular block.  [Bifascicular block].  This raises the possibility 

of post tachycardia induced cardiomyopathy.  Will increase the patient's Toprol-

XL to 50 mg p.o. every 12 hours.  In view of the cardiomyopathy we will continue

patient on Entresto.  In view of the patient's heart rate still being up we will

start the patient on IV Cardizem infusion at 7.5 mg/h.  Also her old records 

from [place] were reviewed.  There is one EKG done which is suspicious for

atrial flutter although the official reading is sinus tachycardia.  But I 

strongly believe that that EKG is atrial flutter, and hence the patient has had 

longstanding atrial fibrillation or at least intermittent paroxysmal atrial 

fibrillation since 2017, the data of that prior EKG from [place].  The susi veras's Cardizem drip has been discontinued.  The patient Toprol will be 

increased 100 mg p.o. every 12 hours.  Her Cardizem is being converted to 

Cardizem  mg p.o. every 12 hours.


10.  Diabetes mellitus type 2 non-insulin-dependent.


11.  Hypothyroidism:: Continue thyroid replacement.  Note the patient's repeat 

TSH is normal, and free T3 and free T4 are normal...  


12 Moderate to severe tricuspid regurgitation.  At least moderate pulmonary 

hypertension with right ventricle systolic pressure of 53 to 58 mmHg.  Repeat 

echocardiogram done shows that the tricuspid regurgitation now is of moderate 

severity.  Right ventricle systolic pressure is 45 to 50 mmHg assuming RA mean 

of 5-10.  This is much improved.


13.  Cardiomyopathy with moderate to severely reduced ejection fraction earlier 

in the admission.  Repeat echo today shows that the LV ejection fraction is 

close to 45% in limited views.


14.  With history of pulmonary embolism: Patient states she is on Xarelto.  Will

continue Xarelto.  


15.  History of breast cancer status post bilateral mastectomies and 

chemotherapy and radiation treatment.


16.  History of multiple abdominal surgeries.


17.  Morbid obesity.


Heart rate is well controlled.  Medications reviewed medical regimen discussed 

with attending physician.  The patient's cardiac status is stable.  Medical 

decision making is of moderate complexity.  40 minutes spent on the patient with

more than 50% time spent in direct patient care.  I have discussed with the 

patient and the patient's granddaughter the future plans such as getting a 30-

day event monitor to assess atrial flutter rate control.  Also later would 

recommend that the patient have IV Lexiscan Cardiolite stress test in the office

in view of the multiple CAD risk factors.  Also her I have explained to the 

since the patient has been chronically on Xarelto the possibility if the 

patient's slowed heart rate shows typical atrial flutter will send her to EP 

cardiology for a possible opinion about ablation of the atrial flutter focus.  

The cardiac status is stable so the patient can be discharged home.  Will follow

the patient in the office.  The patient and the granddaughter have my cell phone

number, and will contact me if she has any chest pain, increasing shortness of 

breath, increasing heart rate or palpitations, dizziness, near syncope or 

syncope or any other new symptoms that may develop.

## 2020-01-07 NOTE — XCELERA REPORT
[address] [address]

                               Tel: [phone]

                               Fax: [phone]



                      Transthoracic Echocardiogram Report

_______________________________________________________________________________



Name: SHAYAN BUSH

MRN: [id number]                           Age: 79 yrs

Gender: Female                            : 1940

Patient Status: Inpatient                 Patient Location: [facility]A

Account #: [id number]

Study Date: 2020 11:35 AM

Accession #: [id number]

_______________________________________________________________________________



Height: 60 in        Weight: 231 lb        BSA: 2.0 m2

_______________________________________________________________________________

Procedure: A two-dimensional transthoracic echocardiogram with color flow and

Doppler was performed in limited views only. Study Quality: Fair.

Reason For Study: ER, RVSP PER DR JIMENEZ



History: CARDIOMYOPATHY / TR / PULMONARY HYPERTENSION.

Ordering Physician: CYNDI FARRAR



Performed By: Sabi Brito

_______________________________________________________________________________



Interpretation Summary

Normal LV size.Only parasternal views obtained.There is moderate diffuse

hypokinesis.LVEF is now 45% , which has improved.

There is no tricuspid stenosis.

There is a mild to moderate amount of tricuspid regurgitation

There is mild to moderate pulmonary hypertension by echo

RVSP is 45 to 50 mm of Hg , with RA mean of 5 to 10.



MMode/2D Measurements & Calculations

RVDd: 4.8 cm  LVIDd: 5.2 cm   FS: 31.3 %             Ao root diam: 3.1 cm



IVSd: 1.0 cm  LVIDs: 3.6 cm   EDV(Teich): 128.9 ml   Ao root area: 7.7 cm2

              LVPWd: 1.0 cm   ESV(Teich): 53.2 ml    LA dimension: 4.0 cm

                              EF(Teich): 58.7 %



Doppler Measurements & Calculations

TR max iraida: 317.1 cm/sec

TR max P.2 mmHg



Left Ventricle

Normal LV size.Only parasternal views obtained.There is moderate diffuse

hypokinesis.LVEF is now 45% , which has improved.



Tricuspid Valve

There is no tricuspid stenosis. There is a mild to moderate amount of

tricuspid regurgitation. There is mild to moderate pulmonary hypertension by

echo. RVSP is 45 to 50 mm of Hg , with RA mean of 5 to 10.





_______________________________________________________________________________

_______________________________________________________________________________



Electronically signed by:      Nuvia Abdul      on 2020 06:32 PM



CC: CYNDI FARRAR, Nuvia

## 2020-01-19 ENCOUNTER — HOSPITAL ENCOUNTER (INPATIENT)
Dept: HOSPITAL 62 - ER | Age: 80
LOS: 10 days | Discharge: NURSING, REHAB OR LONG-TERM CARE | DRG: 308 | End: 2020-01-29
Attending: INTERNAL MEDICINE | Admitting: HOSPITALIST
Payer: MEDICARE

## 2020-01-19 DIAGNOSIS — Z79.84: ICD-10-CM

## 2020-01-19 DIAGNOSIS — E03.9: ICD-10-CM

## 2020-01-19 DIAGNOSIS — I42.9: ICD-10-CM

## 2020-01-19 DIAGNOSIS — E87.5: ICD-10-CM

## 2020-01-19 DIAGNOSIS — Z79.52: ICD-10-CM

## 2020-01-19 DIAGNOSIS — Z90.13: ICD-10-CM

## 2020-01-19 DIAGNOSIS — Z79.02: ICD-10-CM

## 2020-01-19 DIAGNOSIS — I95.89: ICD-10-CM

## 2020-01-19 DIAGNOSIS — M19.90: ICD-10-CM

## 2020-01-19 DIAGNOSIS — R00.1: ICD-10-CM

## 2020-01-19 DIAGNOSIS — T44.7X5A: ICD-10-CM

## 2020-01-19 DIAGNOSIS — I27.20: ICD-10-CM

## 2020-01-19 DIAGNOSIS — T46.1X5A: ICD-10-CM

## 2020-01-19 DIAGNOSIS — N17.9: ICD-10-CM

## 2020-01-19 DIAGNOSIS — J44.9: ICD-10-CM

## 2020-01-19 DIAGNOSIS — I48.0: Primary | ICD-10-CM

## 2020-01-19 DIAGNOSIS — Z90.710: ICD-10-CM

## 2020-01-19 DIAGNOSIS — Z88.8: ICD-10-CM
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
## 2020-01-19 DIAGNOSIS — J84.10: ICD-10-CM

## 2020-01-19 DIAGNOSIS — I07.1: ICD-10-CM

## 2020-01-19 DIAGNOSIS — I48.92: ICD-10-CM

## 2020-01-19 DIAGNOSIS — E11.9: ICD-10-CM

## 2020-01-19 DIAGNOSIS — J96.11: ICD-10-CM

## 2020-01-19 DIAGNOSIS — Z85.3: ICD-10-CM

## 2020-01-19 DIAGNOSIS — Z88.6: ICD-10-CM

## 2020-01-19 DIAGNOSIS — Z91.81: ICD-10-CM

## 2020-01-19 DIAGNOSIS — E66.01: ICD-10-CM

## 2020-01-19 DIAGNOSIS — I50.33: ICD-10-CM

## 2020-01-19 DIAGNOSIS — Z90.49: ICD-10-CM

## 2020-01-19 DIAGNOSIS — Z99.81: ICD-10-CM

## 2020-01-19 DIAGNOSIS — R14.2: ICD-10-CM

## 2020-01-19 DIAGNOSIS — I11.0: ICD-10-CM

## 2020-01-19 LAB
ADD MANUAL DIFF: NO
ALBUMIN SERPL-MCNC: 3.1 G/DL (ref 3.5–5)
ALP SERPL-CCNC: 88 U/L (ref 38–126)
ANION GAP SERPL CALC-SCNC: 4 MMOL/L (ref 5–19)
APPEARANCE UR: CLEAR
APTT PPP: YELLOW S
ARTERIAL BLOOD FIO2: (no result)
ARTERIAL BLOOD H2CO3: 2 MMOL/L (ref 1.05–1.35)
ARTERIAL BLOOD HCO3: 35 MMOL/L (ref 20–24)
ARTERIAL BLOOD PCO2: 66.4 MMHG (ref 35–45)
ARTERIAL BLOOD PH: 7.34 (ref 7.35–7.45)
ARTERIAL BLOOD PO2: 121.9 MMHG (ref 80–100)
ARTERIAL BLOOD TOTAL CO2: 37.1 MMOL/L (ref 21–25)
AST SERPL-CCNC: 21 U/L (ref 14–36)
BASE EXCESS BLDA CALC-SCNC: 7.6 MMOL/L
BASE EXCESS BLDV CALC-SCNC: 9.2 MMOL/L
BASOPHILS # BLD AUTO: 0 10^3/UL (ref 0–0.2)
BASOPHILS NFR BLD AUTO: 0.4 % (ref 0–2)
BILIRUB DIRECT SERPL-MCNC: 0.3 MG/DL (ref 0–0.4)
BILIRUB SERPL-MCNC: 0.4 MG/DL (ref 0.2–1.3)
BILIRUB UR QL STRIP: NEGATIVE
BUN SERPL-MCNC: 38 MG/DL (ref 7–20)
CALCIUM: 9.1 MG/DL (ref 8.4–10.2)
CHLORIDE SERPL-SCNC: 94 MMOL/L (ref 98–107)
CO2 SERPL-SCNC: 39 MMOL/L (ref 22–30)
EOSINOPHIL # BLD AUTO: 0.1 10^3/UL (ref 0–0.6)
EOSINOPHIL NFR BLD AUTO: 1.8 % (ref 0–6)
ERYTHROCYTE [DISTWIDTH] IN BLOOD BY AUTOMATED COUNT: 14.5 % (ref 11.5–14)
GLUCOSE SERPL-MCNC: 118 MG/DL (ref 75–110)
GLUCOSE UR STRIP-MCNC: 50 MG/DL
HCO3 BLDV-SCNC: 40 MMOL/L (ref 20–32)
HCT VFR BLD CALC: 33.5 % (ref 36–47)
HGB BLD-MCNC: 11.1 G/DL (ref 12–15.5)
INR PPP: 3.14
KETONES UR STRIP-MCNC: NEGATIVE MG/DL
LYMPHOCYTES # BLD AUTO: 1 10^3/UL (ref 0.5–4.7)
LYMPHOCYTES NFR BLD AUTO: 12.5 % (ref 13–45)
MCH RBC QN AUTO: 31.5 PG (ref 27–33.4)
MCHC RBC AUTO-ENTMCNC: 33.1 G/DL (ref 32–36)
MCV RBC AUTO: 95 FL (ref 80–97)
MONOCYTES # BLD AUTO: 0.7 10^3/UL (ref 0.1–1.4)
MONOCYTES NFR BLD AUTO: 8.6 % (ref 3–13)
NEUTROPHILS # BLD AUTO: 6.1 10^3/UL (ref 1.7–8.2)
NEUTS SEG NFR BLD AUTO: 76.7 % (ref 42–78)
PCO2 BLDV: 89.1 MMHG (ref 35–63)
PH BLDV: 7.27 [PH] (ref 7.3–7.42)
PH UR STRIP: 5 [PH] (ref 5–9)
PLATELET # BLD: 188 10^3/UL (ref 150–450)
POTASSIUM SERPL-SCNC: 6.1 MMOL/L (ref 3.6–5)
PROT SERPL-MCNC: 6.4 G/DL (ref 6.3–8.2)
PROT UR STRIP-MCNC: NEGATIVE MG/DL
PROTHROMBIN TIME: 32.9 SEC (ref 11.4–15.4)
RBC # BLD AUTO: 3.53 10^6/UL (ref 3.72–5.28)
SAO2 % BLDA: 98.1 % (ref 94–98)
SP GR UR STRIP: 1.01
TOTAL CELLS COUNTED % (AUTO): 100 %
UROBILINOGEN UR-MCNC: NEGATIVE MG/DL (ref ?–2)
WBC # BLD AUTO: 7.9 10^3/UL (ref 4–10.5)

## 2020-01-19 PROCEDURE — A9538 TC99M PYROPHOSPHATE: HCPCS

## 2020-01-19 PROCEDURE — 85027 COMPLETE CBC AUTOMATED: CPT

## 2020-01-19 PROCEDURE — 36415 COLL VENOUS BLD VENIPUNCTURE: CPT

## 2020-01-19 PROCEDURE — 82803 BLOOD GASES ANY COMBINATION: CPT

## 2020-01-19 PROCEDURE — 78496 HEART FIRST PASS ADD-ON: CPT

## 2020-01-19 PROCEDURE — 94660 CPAP INITIATION&MGMT: CPT

## 2020-01-19 PROCEDURE — 80048 BASIC METABOLIC PNL TOTAL CA: CPT

## 2020-01-19 PROCEDURE — 70450 CT HEAD/BRAIN W/O DYE: CPT

## 2020-01-19 PROCEDURE — 82962 GLUCOSE BLOOD TEST: CPT

## 2020-01-19 PROCEDURE — 84100 ASSAY OF PHOSPHORUS: CPT

## 2020-01-19 PROCEDURE — 84443 ASSAY THYROID STIM HORMONE: CPT

## 2020-01-19 PROCEDURE — C1751 CATH, INF, PER/CENT/MIDLINE: HCPCS

## 2020-01-19 PROCEDURE — 83735 ASSAY OF MAGNESIUM: CPT

## 2020-01-19 PROCEDURE — 84481 FREE ASSAY (FT-3): CPT

## 2020-01-19 PROCEDURE — 00410 ANES INTEG SYS CONV ARRHYT: CPT

## 2020-01-19 PROCEDURE — 93308 TTE F-UP OR LMTD: CPT

## 2020-01-19 PROCEDURE — 87070 CULTURE OTHR SPECIMN AEROBIC: CPT

## 2020-01-19 PROCEDURE — 99292 CRITICAL CARE ADDL 30 MIN: CPT

## 2020-01-19 PROCEDURE — 85610 PROTHROMBIN TIME: CPT

## 2020-01-19 PROCEDURE — 99231 SBSQ HOSP IP/OBS SF/LOW 25: CPT

## 2020-01-19 PROCEDURE — 81001 URINALYSIS AUTO W/SCOPE: CPT

## 2020-01-19 PROCEDURE — 83605 ASSAY OF LACTIC ACID: CPT

## 2020-01-19 PROCEDURE — 96374 THER/PROPH/DIAG INJ IV PUSH: CPT

## 2020-01-19 PROCEDURE — 87040 BLOOD CULTURE FOR BACTERIA: CPT

## 2020-01-19 PROCEDURE — C1769 GUIDE WIRE: HCPCS

## 2020-01-19 PROCEDURE — 80076 HEPATIC FUNCTION PANEL: CPT

## 2020-01-19 PROCEDURE — 93005 ELECTROCARDIOGRAM TRACING: CPT

## 2020-01-19 PROCEDURE — 84484 ASSAY OF TROPONIN QUANT: CPT

## 2020-01-19 PROCEDURE — 71045 X-RAY EXAM CHEST 1 VIEW: CPT

## 2020-01-19 PROCEDURE — 84439 ASSAY OF FREE THYROXINE: CPT

## 2020-01-19 PROCEDURE — 99232 SBSQ HOSP IP/OBS MODERATE 35: CPT

## 2020-01-19 PROCEDURE — 82533 TOTAL CORTISOL: CPT

## 2020-01-19 PROCEDURE — 93010 ELECTROCARDIOGRAM REPORT: CPT

## 2020-01-19 PROCEDURE — 36600 WITHDRAWAL OF ARTERIAL BLOOD: CPT

## 2020-01-19 PROCEDURE — 02HV33Z INSERTION OF INFUSION DEVICE INTO SUPERIOR VENA CAVA, PERCUTANEOUS APPROACH: ICD-10-PCS | Performed by: EMERGENCY MEDICINE

## 2020-01-19 PROCEDURE — 80053 COMPREHEN METABOLIC PANEL: CPT

## 2020-01-19 PROCEDURE — 99291 CRITICAL CARE FIRST HOUR: CPT

## 2020-01-19 PROCEDURE — 85025 COMPLETE CBC W/AUTO DIFF WBC: CPT

## 2020-01-19 PROCEDURE — 96361 HYDRATE IV INFUSION ADD-ON: CPT

## 2020-01-19 PROCEDURE — 85730 THROMBOPLASTIN TIME PARTIAL: CPT

## 2020-01-19 RX ADMIN — PATIROMER SCH GM: 8.4 POWDER, FOR SUSPENSION ORAL at 18:32

## 2020-01-19 NOTE — EKG REPORT
SEVERITY:- ABNORMAL ECG -

ATRIAL FLUTTER, A-RATE 245

RBBB AND LAFB

PROBABLE LVH WITH SECONDARY REPOL ABNRM

:

Confirmed by: Nuvia Abdul MD 19-Jan-2020 18:32:00

## 2020-01-19 NOTE — RADIOLOGY REPORT (SQ)
EXAM DESCRIPTION:  CHEST SINGLE VIEW



COMPLETED DATE/TIME:  1/19/2020 4:50 pm



REASON FOR STUDY:  sepsis suspected



COMPARISON:  Chest x-ray 12/30/2019, 12/29/2019.  CT chest 2/6/2019.



EXAM PARAMETERS:  NUMBER OF VIEWS: One view.

TECHNIQUE: Single frontal radiographic view of the chest acquired.

RADIATION DOSE: NA

LIMITATIONS: None.



FINDINGS:  LUNGS AND PLEURA: There is elevation of the right hemidiaphragm.  No consolidation, sizeab
le pleural effusion or pneumothorax.  Pleural thickening is noted at the right lung.

MEDIASTINUM AND HILAR STRUCTURES: Stable appearance.

HEART AND VASCULAR STRUCTURES: The heart is enlarged.  There is mild central vascular congestion.

BONES: No acute findings.

HARDWARE: Surgical clips are noted at the axillary regions.



IMPRESSION:  Cardiomegaly and mild central vascular congestion.  Elevation of the right hemidiaphragm
.



TECHNICAL DOCUMENTATION:  JOB ID:  [id number]

[id number]

 2011 [employer]- All Rights Reserved



Reading location - IP/workstation name: [email or web address]

## 2020-01-19 NOTE — ER DOCUMENT REPORT
ED General





- General


Chief Complaint: Respiratory Distress


Stated Complaint: DIFFICULTY BREATHING


Time Seen by Provider: 01/19/20 17:21


Notes: 





79-year-old woman presents to the emergency department with a complaint of 

shortness of breath and "heartburn".  She states that she began having a burning

sensation in her epigastrium and chest area with associated burping earlier 

today.  Apparently she ate tomato soup and thinks that that may have caused the 

symptoms to begin.  She has had increasing weakness and shortness of breath 

associated with activity since her return home from hospital stay in December.  

She is on 3 L nasal cannula at home, according to the daughter she has had a 

one-week history of progressively worsening symptoms with weakness and inability

to perform ADLs.  She was scheduled to go to inpatient rehab tomorrow.


TRAVEL OUTSIDE OF THE [place] IN LAST 30 DAYS: No





- Related Data


Allergies/Adverse Reactions: 


                                        





albuterol Allergy (Verified 12/25/19 19:53)


   


aspirin Allergy (Verified 12/25/19 19:53)


   


diphenhydramine [From Benadryl] Allergy (Verified 12/25/19 19:53)


   











Past Medical History





- Social History


Smoking Status: Unknown if Ever Smoked


Family History: CAD, DM, Hypertension, Malignancy.  denies: Thyroid Disfunction


Patient has suicidal ideation: No


Patient has homicidal ideation: No





- Past Medical History


Cardiac Medical History: Reports: Hx Atrial Fibrillation, Hx Congestive Heart 

Failure, Hx Hypertension


Pulmonary Medical History: Reports: Hx Asthma, Hx COPD, Hx Pneumonia, Hx 

Respiratory Failure - Chronic respiratory failure with oxygen dependence


Neurological Medical History: Denies: Hx Seizures


Endocrine Medical History: Reports: Hx Diabetes Mellitus Type 2.  Denies: Hx 

Diabetes Mellitus Type 1, Hx Hyperthyroidism, Hx Hypothyroidism


Malignancy Medical History: Reports: Hx Breast Cancer - Status post bilateral 

mastectomies


GI Medical History: Reports: Hx Hiatal Hernia.  Denies: Hx Cirrhosis, Hx Crohn's

Disease, Hx Hepatitis, Hx Ulcerative Colitis


Musculoskeletal Medical History: Reports Hx Arthritis, Denies Hx Gout


Skin Medical History: Denies Hx Eczema, Denies Hx Psoriasis


Infectious Medical History: Denies: Hx Hepatitis


Past Surgical History: Reports: Hx Abdominal Surgery - hernia repair, Hx 

Appendectomy, Hx Breast Surgery, Hx Cholecystectomy, Hx Hysterectomy, Hx 

Mastectomy - bilateral, Other - Bilateral cataract surgery, 5 colon surgeries 

for abnormal growths





Review of Systems





- Review of Systems


Notes: 





Constitutional: Negative for fever.


HENT: Negative for sore throat.


Eyes: Negative for visual changes.


Cardiovascular: + Burning chest discomfort


Respiratory: + Shortness of breath shortness of breath.


Gastrointestinal: + Heartburn


Genitourinary: Negative for dysuria.


Musculoskeletal: Negative for back pain.


Skin: Negative for rash.


Neurological: + Shakiness, negative for headaches, weakness or numbness.





10 point ROS negative except as marked above and in HPI.,





Physical Exam





- Vital signs


Vitals: 


                                        











Resp Pulse Ox


 


 27 H  90 L


 


 01/19/20 16:02  01/19/20 16:02














- Notes


Notes: 





PHYSICAL EXAMINATION:


 


Physical Exam: 


General: Frail elderly woman complaining of shortness of breath and generalized 

weakness


HEENT: NC/AT, pupils equal round and reactive to light, MM moist,nares clear, 

oropharynx clear


Neck: supple, no adenopathy, no masses.


Lungs: clear, no wheezing, no rales no rhonchi


CVS: Tachycardic rate and rhythm no murmur gallop or rub


Abdomen: Soft active nontender, no masses, no hepatosplenomegaly


Ext:   No edema, clubbing or cyanosis.


Neuro: Alert and responsive, moving all 4 extremities on command, cranial nerves

intact.


Skin: Intact no open lesions, no rash


PSYCH: Normal mood, normal affect.


 








Course





- Re-evaluation


Re-evalutation: 





01/20/20 01:45


A discussion was had with the daughter of the patient regarding the need for a 

central line given her hypotension and need for vasopressor.  After much 

discussion phone calls with other family members the family agreed to have the 

central line placed.  The triple-lumen catheter was successfully placed using 

sterile technique and chest x-ray was performed.  Patient tolerated the p

rocedure without complication.  The central line was placed in the left jugular 

vein after 2 unsuccessful attempts to thread the wire on the left side.  Chest 

x-ray reveals a good placement of the central line.











- Vital Signs


Vital signs: 


                                        











Temp Pulse Resp BP Pulse Ox


 


 98.5 F      12   79/64 L  98 


 


 01/19/20 21:09     01/19/20 22:45  01/19/20 22:45  01/19/20 22:45














- Laboratory


Result Diagrams: 


                                 01/19/20 16:25





                                 01/19/20 16:25


Laboratory results interpreted by me: 


                                        











  01/19/20 01/19/20 01/19/20





  16:25 16:25 16:25


 


RBC  3.53 L  


 


Hgb  11.1 L  


 


Hct  33.5 L  


 


RDW  14.5 H  


 


Lymph % (Auto)  12.5 L  


 


PT   32.9 H 


 


Carbonic Acid   


 


ABG pH   


 


ABG pCO2   


 


ABG pO2   


 


ABG HCO3   


 


ABG Total CO2   


 


ABG O2 Saturation   


 


VBG pH   


 


VBG pCO2   


 


VBG HCO3   


 


Potassium    6.1 H*


 


Chloride    94 L


 


Carbon Dioxide    39 H


 


Anion Gap    4 L


 


BUN    38 H


 


Creatinine    2.27 H


 


Est GFR ( Amer)    25 L


 


Est GFR (MDRD) Non-Af    21 L


 


Glucose    118 H


 


Albumin    3.1 L


 


Urine Glucose (UA)   


 


Leukocyte Esterase Rfl   














  01/19/20 01/19/20 01/19/20





  16:25 18:04 19:24


 


RBC   


 


Hgb   


 


Hct   


 


RDW   


 


Lymph % (Auto)   


 


PT   


 


Carbonic Acid   2.00 H 


 


ABG pH   7.34 L 


 


ABG pCO2   66.4 H 


 


ABG pO2   121.9 H 


 


ABG HCO3   35.0 H 


 


ABG Total CO2   37.1 H 


 


ABG O2 Saturation   98.1 H 


 


VBG pH  7.27 L  


 


VBG pCO2  89.1 H*  


 


VBG HCO3  40.0 H  


 


Potassium   


 


Chloride   


 


Carbon Dioxide   


 


Anion Gap   


 


BUN   


 


Creatinine   


 


Est GFR ( Amer)   


 


Est GFR (MDRD) Non-Af   


 


Glucose   


 


Albumin   


 


Urine Glucose (UA)    50 H


 


Leukocyte Esterase Rfl    TRACE H








, I have reviewed laboratory data and used this information for the treatment 

decisions regarding the patient.





- Diagnostic Test


Radiology reviewed: Image reviewed, Reports reviewed - Chest x-ray: Cardio

megaly, right hemidiaphragm, mild central congestion.





Procedures





- Central Line


  ** Left Internal jugular


Time completed: 01:30


Consent obtained: Yes


Central line pre-insertion: Sterile PPE donned, Chloraprep applied, Sterile 

drapes applied


Central line lumen type: Triple


Anesthetic type: 1% Lidocaine


mL's of anesthesia: 3


Ultrasound guided: Yes - 21-gauge needle used to puncture the skin and guidewire

inserted 


Line secured with sutures: Yes


Central line post-insertion: Blood return from lumens, Biopatch applied, S

utured, Sterile dressing applied, Position confirmed w/ CXR


Number of attempts: 3 - The wire kinked with 2 tubes on the right side


Complications: No - Portable chest x-ray reveals good placement of the central 

line





Critical Care Note





- Critical Care Note


Total time excluding time spent on procedures (mins): 120 - Critical care time 

spent obtaining history from patient or surrogate, discussions with consultants,

development of treatment plan with patient or surrogate, evaluation of patient's

response to treatment, examination of patient, ordering and performing 

treatments and interventions, ordering and review of laboratory studies, re-

evaluation of patient's condition, ordering and review of radiographic studies 

and review of old charts





Discharge





- Discharge


Clinical Impression: 


 CAROL (acute kidney injury), Hyperkalemia, Morbid obesity with BMI of 40.0-44.9, 

adult





Atrial fibrillation


Qualifiers:


 Atrial fibrillation type: unspecified Qualified Code(s): I48.91 - Unspecified 

atrial fibrillation





Chronic obstructive pulmonary disease (COPD)


Qualifiers:


 COPD type: unspecified COPD Qualified Code(s): J44.9 - Chronic obstructive 

pulmonary disease, unspecified





Hypotension


Qualifiers:


 Hypotension type: other hypotension type Qualified Code(s): I95.89 - Other 

hypotension





Condition: Critical


Disposition: ADMITTED AS INPATIENT


Admitting Provider: Dr Ellington


Unit Admitted: ICU - Who is feeling Danielle

## 2020-01-19 NOTE — RADIOLOGY REPORT (SQ)
EXAM DESCRIPTION: 



CT HEAD WITHOUT IV CONTRAST



COMPLETED DATE/TME:  01/19/2020 21:36



CLINICAL HISTORY: 



fall



COMPARISON: 



None Available.



TECHNIQUE: 



Contiguous axial images of the brain were obtained without the

administration of intravenous contrast. This exam was performed

according to our departmental dose-optimization program, which

includes automated exposure control, adjustment of the mA and/or

kV according to patient size and/or use of iterative

reconstruction technique.



FINDINGS: 



There is no acute intracranial hemorrhage or mass effect.

Ventricular system is within normal limits. There is adequate

gray-white matter differentiation. There is no skull fracture.

The visualized paranasal sinuses and mastoid air cells are within

normal limits.



IMPRESSION: 



No acute intracranial abnormalities.

## 2020-01-20 LAB
ADD MANUAL DIFF: NO
ADD MANUAL DIFF: NO
ALBUMIN SERPL-MCNC: 2.6 G/DL (ref 3.5–5)
ALBUMIN SERPL-MCNC: 2.7 G/DL (ref 3.5–5)
ALP SERPL-CCNC: 74 U/L (ref 38–126)
ALP SERPL-CCNC: 76 U/L (ref 38–126)
ANION GAP SERPL CALC-SCNC: 2 MMOL/L (ref 5–19)
ANION GAP SERPL CALC-SCNC: 4 MMOL/L (ref 5–19)
APTT BLD: 39.9 SEC (ref 23.5–35.8)
AST SERPL-CCNC: 19 U/L (ref 14–36)
AST SERPL-CCNC: 22 U/L (ref 14–36)
BASOPHILS # BLD AUTO: 0 10^3/UL (ref 0–0.2)
BASOPHILS # BLD AUTO: 0 10^3/UL (ref 0–0.2)
BASOPHILS NFR BLD AUTO: 0.5 % (ref 0–2)
BASOPHILS NFR BLD AUTO: 0.6 % (ref 0–2)
BILIRUB DIRECT SERPL-MCNC: 0.3 MG/DL (ref 0–0.4)
BILIRUB DIRECT SERPL-MCNC: 0.3 MG/DL (ref 0–0.4)
BILIRUB SERPL-MCNC: 0.4 MG/DL (ref 0.2–1.3)
BILIRUB SERPL-MCNC: 0.5 MG/DL (ref 0.2–1.3)
BUN SERPL-MCNC: 32 MG/DL (ref 7–20)
BUN SERPL-MCNC: 36 MG/DL (ref 7–20)
CALCIUM: 8.9 MG/DL (ref 8.4–10.2)
CALCIUM: 9.4 MG/DL (ref 8.4–10.2)
CHLORIDE SERPL-SCNC: 101 MMOL/L (ref 98–107)
CHLORIDE SERPL-SCNC: 102 MMOL/L (ref 98–107)
CO2 SERPL-SCNC: 30 MMOL/L (ref 22–30)
CO2 SERPL-SCNC: 35 MMOL/L (ref 22–30)
EOSINOPHIL # BLD AUTO: 0.2 10^3/UL (ref 0–0.6)
EOSINOPHIL # BLD AUTO: 0.2 10^3/UL (ref 0–0.6)
EOSINOPHIL NFR BLD AUTO: 2.7 % (ref 0–6)
EOSINOPHIL NFR BLD AUTO: 3 % (ref 0–6)
ERYTHROCYTE [DISTWIDTH] IN BLOOD BY AUTOMATED COUNT: 14.4 % (ref 11.5–14)
ERYTHROCYTE [DISTWIDTH] IN BLOOD BY AUTOMATED COUNT: 14.5 % (ref 11.5–14)
GLUCOSE SERPL-MCNC: 82 MG/DL (ref 75–110)
GLUCOSE SERPL-MCNC: 84 MG/DL (ref 75–110)
HCT VFR BLD CALC: 29.6 % (ref 36–47)
HCT VFR BLD CALC: 29.9 % (ref 36–47)
HGB BLD-MCNC: 9.9 G/DL (ref 12–15.5)
HGB BLD-MCNC: 9.9 G/DL (ref 12–15.5)
INR PPP: 2.46
LYMPHOCYTES # BLD AUTO: 1.1 10^3/UL (ref 0.5–4.7)
LYMPHOCYTES # BLD AUTO: 1.3 10^3/UL (ref 0.5–4.7)
LYMPHOCYTES NFR BLD AUTO: 18.5 % (ref 13–45)
LYMPHOCYTES NFR BLD AUTO: 19 % (ref 13–45)
MCH RBC QN AUTO: 31.7 PG (ref 27–33.4)
MCH RBC QN AUTO: 31.9 PG (ref 27–33.4)
MCHC RBC AUTO-ENTMCNC: 33.1 G/DL (ref 32–36)
MCHC RBC AUTO-ENTMCNC: 33.5 G/DL (ref 32–36)
MCV RBC AUTO: 95 FL (ref 80–97)
MCV RBC AUTO: 96 FL (ref 80–97)
MONOCYTES # BLD AUTO: 0.6 10^3/UL (ref 0.1–1.4)
MONOCYTES # BLD AUTO: 0.7 10^3/UL (ref 0.1–1.4)
MONOCYTES NFR BLD AUTO: 10.4 % (ref 3–13)
MONOCYTES NFR BLD AUTO: 9.7 % (ref 3–13)
NEUTROPHILS # BLD AUTO: 3.9 10^3/UL (ref 1.7–8.2)
NEUTROPHILS # BLD AUTO: 4.8 10^3/UL (ref 1.7–8.2)
NEUTS SEG NFR BLD AUTO: 67.5 % (ref 42–78)
NEUTS SEG NFR BLD AUTO: 68.1 % (ref 42–78)
PHOSPHATE SERPL-MCNC: 4.6 MG/DL (ref 2.5–4.5)
PHOSPHATE SERPL-MCNC: 5.1 MG/DL (ref 2.5–4.5)
PLATELET # BLD: 153 10^3/UL (ref 150–450)
PLATELET # BLD: 167 10^3/UL (ref 150–450)
POTASSIUM SERPL-SCNC: 5.6 MMOL/L (ref 3.6–5)
POTASSIUM SERPL-SCNC: 5.9 MMOL/L (ref 3.6–5)
PROT SERPL-MCNC: 5.1 G/DL (ref 6.3–8.2)
PROT SERPL-MCNC: 5.3 G/DL (ref 6.3–8.2)
PROTHROMBIN TIME: 27.1 SEC (ref 11.4–15.4)
RBC # BLD AUTO: 3.11 10^6/UL (ref 3.72–5.28)
RBC # BLD AUTO: 3.13 10^6/UL (ref 3.72–5.28)
TOTAL CELLS COUNTED % (AUTO): 100 %
TOTAL CELLS COUNTED % (AUTO): 100 %
WBC # BLD AUTO: 5.7 10^3/UL (ref 4–10.5)
WBC # BLD AUTO: 7 10^3/UL (ref 4–10.5)

## 2020-01-20 RX ADMIN — INSULIN HUMAN SCH: 100 INJECTION, SOLUTION PARENTERAL at 11:33

## 2020-01-20 RX ADMIN — INSULIN HUMAN SCH UNIT: 100 INJECTION, SOLUTION PARENTERAL at 21:18

## 2020-01-20 RX ADMIN — HYDROCORTISONE SODIUM SUCCINATE SCH MG: 100 INJECTION, POWDER, FOR SOLUTION INTRAMUSCULAR; INTRAVENOUS at 14:16

## 2020-01-20 RX ADMIN — Medication SCH ML: at 14:17

## 2020-01-20 RX ADMIN — Medication SCH UNIT: at 21:19

## 2020-01-20 RX ADMIN — SODIUM CHLORIDE, SODIUM LACTATE, POTASSIUM CHLORIDE, AND CALCIUM CHLORIDE PRN MLS/HR: .6; .31; .03; .02 INJECTION, SOLUTION INTRAVENOUS at 17:14

## 2020-01-20 RX ADMIN — HYDROCORTISONE SODIUM SUCCINATE SCH MG: 100 INJECTION, POWDER, FOR SOLUTION INTRAMUSCULAR; INTRAVENOUS at 08:50

## 2020-01-20 RX ADMIN — Medication SCH UNIT: at 14:16

## 2020-01-20 RX ADMIN — INSULIN HUMAN SCH: 100 INJECTION, SOLUTION PARENTERAL at 08:19

## 2020-01-20 RX ADMIN — Medication SCH: at 06:26

## 2020-01-20 RX ADMIN — SODIUM CHLORIDE, SODIUM LACTATE, POTASSIUM CHLORIDE, AND CALCIUM CHLORIDE PRN MLS/HR: .6; .31; .03; .02 INJECTION, SOLUTION INTRAVENOUS at 03:30

## 2020-01-20 RX ADMIN — SACUBITRIL AND VALSARTAN SCH TAB: 49; 51 TABLET, FILM COATED ORAL at 12:27

## 2020-01-20 RX ADMIN — RIVAROXABAN SCH MG: 10 TABLET, FILM COATED ORAL at 12:26

## 2020-01-20 RX ADMIN — SODIUM CHLORIDE, SODIUM LACTATE, POTASSIUM CHLORIDE, AND CALCIUM CHLORIDE PRN MLS/HR: .6; .31; .03; .02 INJECTION, SOLUTION INTRAVENOUS at 10:30

## 2020-01-20 RX ADMIN — DOCUSATE SODIUM SCH MG: 100 CAPSULE, LIQUID FILLED ORAL at 21:57

## 2020-01-20 RX ADMIN — Medication SCH: at 21:19

## 2020-01-20 RX ADMIN — SACUBITRIL AND VALSARTAN SCH TAB: 49; 51 TABLET, FILM COATED ORAL at 21:19

## 2020-01-20 RX ADMIN — PREGABALIN SCH MG: 100 CAPSULE ORAL at 17:46

## 2020-01-20 RX ADMIN — HYDROCORTISONE SODIUM SUCCINATE SCH MG: 100 INJECTION, POWDER, FOR SOLUTION INTRAMUSCULAR; INTRAVENOUS at 21:18

## 2020-01-20 RX ADMIN — PREGABALIN SCH MG: 100 CAPSULE ORAL at 14:15

## 2020-01-20 RX ADMIN — PATIROMER SCH GM: 8.4 POWDER, FOR SUSPENSION ORAL at 16:48

## 2020-01-20 RX ADMIN — INSULIN HUMAN SCH UNIT: 100 INJECTION, SOLUTION PARENTERAL at 16:32

## 2020-01-20 RX ADMIN — HYDROCODONE BITARTRATE AND ACETAMINOPHEN PRN TAB: 5; 325 TABLET ORAL at 21:21

## 2020-01-20 RX ADMIN — HYDROCODONE BITARTRATE AND ACETAMINOPHEN PRN TAB: 5; 325 TABLET ORAL at 12:31

## 2020-01-20 NOTE — PROGRESS NOTE
Provider Note


Provider Note: 





Recent admission by Maryjane Paulie several hours ago. Pt is improved but on 

pressors. She has been on prednisone 10mg at home and ill for some time. Her 

risk of hypoadrenalism and CIRCI (critical care related adrenal insufficiency) 

is high. Will check random cortisol and start IV hydrocortisone. Critical care 

time 20 minutes.

## 2020-01-20 NOTE — CRITICAL CARE ADMISSION REPORT
HPI


Date:: 01/20/20


Time:: 01:31


Reason for ICU Reason:: Hypotension, AMS, CAROL


HPI: 


Mrs. Maharaj is a 79yr old F with PMHx of COPD, CHF with most recent echo 

showing EF of 55-60% on 1/9/20, pulmonary fibrosis, COPD on home O2, HTN, hx of 

breast CA with bilateral mastecomy, DMII and arthritis. She was brought to the 

ED tonight by her daughter after pt had complaints of increased SOB and reported

"heartburn" symptoms after eating tomato soup. Pt was discharged from the 

hospital 15 days ago after being treated for weakness and SOB. Daughter stated 

that pt usually ambulates with a walker and suffered a fall down to bilateral 

knees about 5 days ago. She believes her knees gave out on her while walking; 

she did not hit her head and there was no LOC. From this fall pt was not able to

ambulate and required family assistance to transfer from chair to bedside 

commode. During one of these transfers, she slid from the arms of family and to 

the ground, again to her knees. Daughter states she has been noticeably more 

lethargic over the past 3-4 days but able to be aroused to eat small meals and 

take her meds that family administers for her. No complaints of increase cough. 

Daughter states she has not had changes to bowel or bladder. On assessment in 

the ED, pt is noted to be hypotension 70s and in Afib with a rate 100-120 on the

monitor; daughter states pt did not take her medications this AM. She is resting

in bed with NC on without acute distress. Difficult to arouse but when asked to 

wake by daughter pt will mumble and did answer "no" when asked if she was hungry

but did not open her eyes. Labs on arrival were significant for K 6.1 that was 

treat by ED - repeat POC while at bedside was 94. Request ED obtain CT to rule 

out acute concerns for AMS/lethargy which was negative. 





Discussion with daughter regarding goals of care and treatment options was had, 

she stated that pt had told her she did not want to be placed on a breathing 

machine "if there was no chance," myself and ED physician discussed options of 

DNR/DNI and what this means in regards to treatment. Long discussion had with 

daughter and grand-daughter via phone to answer questions about central line 

placement while in the ED due to the need for vasopressors with continued 

hypotension to the 60s and declining mentation. Daughter agreed to line 

placement. ED physician to place central line prior to transport





- Diagnosis/Plan


(1) Acute and chronic respiratory failure with hypoxia


Is this a current diagnosis for this admission?: Yes   





(2) CAROL (acute kidney injury)


Is this a current diagnosis for this admission?: Yes   





(3) Hypotension


Qualifiers: 


   Hypotension type: other hypotension type   Qualified Code(s): I95.89 - Other 

hypotension   


Is this a current diagnosis for this admission?: Yes   








(5) Atrial fibrillation


Qualifiers: 


   Atrial fibrillation type: unspecified   Qualified Code(s): I48.91 - 

Unspecified atrial fibrillation   


Is this a current diagnosis for this admission?: Yes   





(6) Cardiomyopathy


Is this a current diagnosis for this admission?: Yes   





(7) Chronic obstructive pulmonary disease (COPD)


Qualifiers: 


   COPD type: unspecified COPD   Qualified Code(s): J44.9 - Chronic obstructive 

pulmonary disease, unspecified   


Is this a current diagnosis for this admission?: Yes   





(8) Diabetes mellitus type 2 in obese


Is this a current diagnosis for this admission?: Yes   





(9) Elevated hemidiaphragm


Is this a current diagnosis for this admission?: Yes   





(10) Lethargy


Is this a current diagnosis for this admission?: Yes   





(11) Altered mental status


Is this a current diagnosis for this admission?: Yes   





- .


Plan Summary: 


Pulm:


* continious pulse ox - maintain O2 >89%


* maintain low threshold for intubation - did well on BiPAP on previous 

  admission, initial ABG was stable, repeat ABG pending


* Hx of COPD - nebs 


* Chest xray with stable right hemidiaphragm present on previous admission, 

  cardiomegaly and scattered patchy inflitrates that appear similar from 

  previous admission xray


CV:


* maintain continuos tele 


* Hx of HTN on home metoprolol QID per family with concerns that pts BP was too 

  low at home - hold antihypertensives at this time


* Afib on the monitor - can start cardizem if BP stabilizes or start amioderone 

  for rate control until stable


* Hx of CHF - avoid excessive fluids and monitor I&O closely


* Cardiomyopathy with most recent EF of 55-60% on 1/9/20


* EKG showing evidence of hyperkalemia - repeal in AM


Renal:


* CAROL with Cr at 2.27 with basline or 0.7 - likley 2/2 dehydration and 

  hypotension for indeterminate period - continue IV fluids and monitor response


* place ramires for strict I&O


* trend renal indices and replace lytes PRN


* initial K elevated - pt takes PO K at home, likely elevated 2/2 taking meds 

  without PO intake and CAROL - treated in ED, pending repeat for follow up


Neuro:


* AMS/lethargy - CT head negative for acute concerns, POC stable on assessment, 

  repeat ABG pending - will continue to monitor, if not improvement with i

  ncrease in BP and return of stable ABG will consider additional labs and 

  testing


* no acute concerns on phyical assessment - Q2hr neuro assessments to monitor 

  for acute changes


* Hold home lyrica and hydrocodone while altered - treat pain PRN


GI/nutrition:


* NPO at this time


* no current indication for GI ppx


Endo:


* Hx of DM - Q2hr POC, SSI


* hx of hypothyroidism recently diagnosed - hold on synthroid while NPO, TSH 

  pending to monitor response to synthroid - add IV if needed


Heme/onc:


* Stable H/H - continue to trend and transfuse for <7.0


* Hold Xarelto at home - hold while NPO with AMS - heparin for DVT ppx


* hx of breast CA with mastectomy


ID: 


* WBC stable at 7.9, no fever, lactate negative, UA grossly negative - will hold

  on abx at this time


* blood cultures pending





Code status: daughter reported that pt stated she did not want to be placed on a

breathing machine if "there was nothing that could be done." Daughter states she

would like to proceed with full code at this time as her brother is on her way 

here from [place] and she believes it is important to make a decision with him 

here. Continuing conversations will be required to clarify goal of care with 

patient and family. 





Past Medical History


Past Medical History: 





as per HPI


Cardiac Medical History: Reports: Atrial Fibrillation, Congestive Heart Failure,

Hypertension


Pulmonary Medical History: Reports: Asthma, Chronic Obstructive Pulmonary 

Disease (COPD), Pneumonia, Respiratory Failure - Chronic respiratory failure 

with oxygen dependence


Neurological Medical History: 


   Denies: Seizures


Endocrine Medical History: Reports: Diabetes Mellitus Type 2


   Denies: Diabetes Mellitus Type 1, Hyperthyroidism, Hypothyroidism


Malignancy Medical History: Reports: Breast Cancer - Status post bilateral 

mastectomies


GI Medical History: Reports: Hiatal Hernia


   Denies: Cirrhosis, Crohn's Disease, Hepatitis, Ulcerative Colitis


Musculoskeltal Medical History: Reports: Arthritis


   Denies: Gout


Skin Medical History: 


   Denies: Eczema, Psoriasis


Hematology: 


   Denies: Anemia, Bleeding Tendencies





Past Surgical History


Past Surgical History: Reports: Appendectomy, Cholecystectomy, Hysterectomy, 

Mastectomy - bilateral, Other - Bilateral cataract surgery, 5 colon surgeries 

for abnormal growths





Social/Family History





- Social History


Lives with: Family


Smoking Status: Unknown if Ever Smoked


Frequency of Alcohol Use: None


Hx Recreational Drug Use: No


Drugs: None


Hx Prescription Drug Abuse: No





- Medication/Allergies


Home Medications: 








Fluticasone/Umeclidin/Vilanter [Trelegy 100-62.5-25 Mcg Ellipta 14 Dose/Dpi] 1 

puff IH DAILY 12/26/19 


Furosemide [Lasix 20 mg Tablet] 20 mg PO QAM 12/26/19 


Glimepiride 2 mg PO DAILY 12/26/19 


Hydrocodone/Acetaminophen [Norco 5-325 Tablet] 1 each PO Q6HP PRN 12/26/19 


Levothyroxine Sodium [Synthroid 0.025 mg Tablet] 25 mcg PO DAILY 12/26/19 


Potassium Chloride [Klor-Con M20] 20 meq PO DAILY 12/26/19 


Pregabalin [Lyrica 100 mg Capsule] 100 mg PO TID 12/26/19 


Rivaroxaban [Xarelto] 20 mg PO DAILY 12/26/19 


Diltiazem HCl [Cardizem 90 mg Tablet] 90 mg PO Q8 #90 tablet 01/05/20 


Levofloxacin [Levaquin 500 mg Tablet] 500 mg PO DAILY 3 Days #3 tablet 01/05/20 


Metoprolol Succinate [Toprol Xl 50 mg Tab.sr] 100 mg PO Q12 #120 tab.sr.24h 

01/05/20 


Prednisone [Deltasone 10 mg Tablet] 10 mg PO BID 3 Days #6 tablet 01/05/20 


Sacubitril/Valsartan [Entresto 49 mg/51 mg Tablet] 1 tab PO Q12 #60 tablet 

01/05/20 








Allergies/Adverse Reactions: 


                                        





albuterol Allergy (Verified 12/25/19 19:53)


   


aspirin Allergy (Verified 12/25/19 19:53)


   


diphenhydramine [From Benadryl] Allergy (Verified 12/25/19 19:53)


   











Review of Systems


ROS unobtainable: Due to mental status





Physical Exam


Vital Signs: 


                                        











Temp Pulse Resp BP Pulse Ox


 


 98.5 F      12   79/64 L  98 


 


 01/19/20 21:09     01/19/20 22:45  01/19/20 22:45  01/19/20 22:45








                                 Intake & Output











 01/18/20 01/19/20 01/20/20





 06:59 06:59 06:59


 


Intake Total   955


 


Balance   955


 


Weight   102 kg








                                  Weight/Height





Weight                           102 kg


Height                           5 ft








General appearance: PRESENT: obese, other - elderly woman lying in bed with 

pulse ox on with eyes closed - difficult to arouse


Head exam: PRESENT: atraumatic


Eye exam: PRESENT: conjunctiva pink, other - pupils equal reactive to light, 

sluggish bilaterally.  ABSENT: scleral icterus


Ear exam: PRESENT: normal external ear exam


Mouth exam: PRESENT: moist


Neck exam: ABSENT: tracheal deviation


Respiratory exam: PRESENT: other - diminished to b/l LL.  ABSENT: chest wall 

tenderness, rhonchi, wheezes


Cardiovascular exam: PRESENT: irregular rhythm


Vascular exam: PRESENT: pallor


GI/Abdominal exam: PRESENT: normal bowel sounds.  ABSENT: distended, mass, 

organolmegaly, tenderness


Extremities exam: PRESENT: joint swelling - b/l knee - no erythema or warmth to 

the joint, +1 edema


Neurological exam: PRESENT: altered - lethargic, difficult to arouse - wakes to 

persistant questions, answers simple questions yes/no but does not open eyes or 

follow commands.  ABSENT: alert, awake


Focused psych exam: ABSENT: restlessness


Skin exam: PRESENT: dry, warm





Laboratory/Radiographs


Laboratory Results: 


                                        





                                 01/19/20 16:25 





                                 01/19/20 16:25 





                                        











  01/19/20 01/19/20 01/19/20





  16:25 16:25 16:25


 


WBC  7.9  


 


RBC  3.53 L  


 


Hgb  11.1 L  


 


Hct  33.5 L  


 


MCV  95  


 


MCH  31.5  


 


MCHC  33.1  


 


RDW  14.5 H  


 


Plt Count  188  


 


Seg Neutrophils %  76.7  


 


Carbonic Acid   


 


HCO3/H2CO3 Ratio   


 


ABG pH   


 


ABG pCO2   


 


ABG pO2   


 


ABG HCO3   


 


ABG O2 Saturation   


 


ABG Base Excess   


 


VBG pH    7.27 L


 


VBG pCO2    89.1 H*


 


VBG HCO3    40.0 H


 


VBG Base Excess    9.2


 


FiO2   


 


Sodium   138.3 


 


Potassium   6.1 H* 


 


Chloride   94 L 


 


Carbon Dioxide   39 H 


 


Anion Gap   4 L 


 


BUN   38 H 


 


Creatinine   2.27 H 


 


Est GFR ( Amer)   25 L 


 


Glucose   118 H 


 


Lactic Acid   


 


Calcium   9.1 


 


Total Bilirubin   0.4 


 


AST   21 


 


Alkaline Phosphatase   88 


 


Total Protein   6.4 


 


Albumin   3.1 L 


 


Urine Color   


 


Urine Appearance   


 


Urine pH   


 


Ur Specific Gravity   


 


Urine Protein   


 


Urine Glucose (UA)   


 


Urine Ketones   


 


Urine Blood   


 


Urine RBC (Auto)   














  01/19/20 01/19/20 01/19/20





  16:25 18:04 18:23


 


WBC   


 


RBC   


 


Hgb   


 


Hct   


 


MCV   


 


MCH   


 


MCHC   


 


RDW   


 


Plt Count   


 


Seg Neutrophils %   


 


Carbonic Acid   2.00 H 


 


HCO3/H2CO3 Ratio   17:1 


 


ABG pH   7.34 L 


 


ABG pCO2   66.4 H 


 


ABG pO2   121.9 H 


 


ABG HCO3   35.0 H 


 


ABG O2 Saturation   98.1 H 


 


ABG Base Excess   7.6 


 


VBG pH   


 


VBG pCO2   


 


VBG HCO3   


 


VBG Base Excess   


 


FiO2   3L 


 


Sodium   


 


Potassium   


 


Chloride   


 


Carbon Dioxide   


 


Anion Gap   


 


BUN   


 


Creatinine   


 


Est GFR (African Amer)   


 


Glucose   


 


Lactic Acid  1.1   1.2


 


Calcium   


 


Total Bilirubin   


 


AST   


 


Alkaline Phosphatase   


 


Total Protein   


 


Albumin   


 


Urine Color   


 


Urine Appearance   


 


Urine pH   


 


Ur Specific Gravity   


 


Urine Protein   


 


Urine Glucose (UA)   


 


Urine Ketones   


 


Urine Blood   


 


Urine RBC (Auto)   














  01/19/20





  19:24


 


WBC 


 


RBC 


 


Hgb 


 


Hct 


 


MCV 


 


MCH 


 


MCHC 


 


RDW 


 


Plt Count 


 


Seg Neutrophils % 


 


Carbonic Acid 


 


HCO3/H2CO3 Ratio 


 


ABG pH 


 


ABG pCO2 


 


ABG pO2 


 


ABG HCO3 


 


ABG O2 Saturation 


 


ABG Base Excess 


 


VBG pH 


 


VBG pCO2 


 


VBG HCO3 


 


VBG Base Excess 


 


FiO2 


 


Sodium 


 


Potassium 


 


Chloride 


 


Carbon Dioxide 


 


Anion Gap 


 


BUN 


 


Creatinine 


 


Est GFR (African Amer) 


 


Glucose 


 


Lactic Acid 


 


Calcium 


 


Total Bilirubin 


 


AST 


 


Alkaline Phosphatase 


 


Total Protein 


 


Albumin 


 


Urine Color  YELLOW


 


Urine Appearance  CLEAR


 


Urine pH  5.0


 


Ur Specific Gravity  1.015


 


Urine Protein  NEGATIVE


 


Urine Glucose (UA)  50 H


 


Urine Ketones  NEGATIVE


 


Urine Blood  NEGATIVE


 


Urine RBC (Auto)  3








                                        











  01/19/20





  16:25


 


Troponin I  < 0.012











Impressions: 


                                        





Chest X-Ray  01/19/20 16:04


IMPRESSION:  Cardiomegaly and mild central vascular congestion.  Elevation of 

the right hemidiaphragm.


 








Head CT  01/19/20 21:36


IMPRESSION: 


 


No acute intracranial abnormalities.


 


 


 


 














Critical Time


Critical Time (minutes): 70 - not including procedures


-: 


The care of a critically ill patient is dynamic.  This note represents a static 

moment in the admission process. Orders and treatments may be given 

simultaneously and urgently, and time is not representative of the treatment 

process.





This patient requires Critical Care secondary to life threatening organ or limb 

dysfunction.  Without Critical Care services, the patient is at risk for 

increased mortality and morbidity.

## 2020-01-20 NOTE — RADIOLOGY REPORT (SQ)
EXAM DESCRIPTION: 



XR CHEST 1 VIEW



COMPLETED DATE/TME:  01/20/2020 01:40



CLINICAL HISTORY: 



79 years, Female, Post central line placement



COMPARISON:

2019 2020



NUMBER OF VIEWS:

Single



TECHNIQUE:

Portable



LIMITATIONS:

None.



FINDINGS:



Cardiomediastinal silhouette is prominent but stable. Elevation

right hemidiaphragm. Chronic parenchymal lung change.



Left IJ central venous catheter tip projects over SVC.

Pneumothorax. No effusion



IMPRESSION:



Satisfactory placement of central venous catheter

 



copyright 2011 [employer]- All Rights Reserved

## 2020-01-20 NOTE — RADIOLOGY REPORT (SQ)
EXAM DESCRIPTION:  CHEST SINGLE VIEW



COMPLETED DATE/TIME:  1/20/2020 6:11 am



REASON FOR STUDY:  abnormal breath sounds



COMPARISON:  AP view of the chest from 1/20/2020.



EXAM PARAMETERS:  NUMBER OF VIEWS: One view.

TECHNIQUE:  An AP view of the chest was obtained.

RADIATION DOSE: NA

LIMITATIONS: None.



FINDINGS:  LUNGS AND PLEURA: Low inspiratory lung volumes.  The the elevation of the right hemidiaphr
agm, the right-sided pleural thickening, the blunting of the right lateral costophrenic sulcus, and t
he left retrocardiac opacities are unchanged.  There is no pneumothorax.

MEDIASTINUM AND HILAR STRUCTURES: Stable mediastinal and hilar contours.

HEART AND VASCULAR STRUCTURES: Stable cardiomegaly.

BONES: No acute findings.

HARDWARE: The tip of the left IJ central venous catheter projects within the SVC.  There surgical cli
ps that project within the right axilla.

OTHER: No other finding.



IMPRESSION:  Unchanged radiographic appearance of the chest.



TECHNICAL DOCUMENTATION:  JOB ID:  [id number]

 2011 [employer]- All Rights Reserved



Reading location - IP/workstation name: [email or web address]

## 2020-01-20 NOTE — EKG REPORT
SEVERITY:- ABNORMAL ECG -

ATRIAL FLUTTER WITH 2:1 AV BLOCK

RBBB AND LAFB

:

Confirmed by: Chaitanya Mendez MD 20-Jan-2020 07:37:33

## 2020-01-21 LAB
ALBUMIN SERPL-MCNC: 2.6 G/DL (ref 3.5–5)
ALP SERPL-CCNC: 83 U/L (ref 38–126)
ANION GAP SERPL CALC-SCNC: 4 MMOL/L (ref 5–19)
AST SERPL-CCNC: 16 U/L (ref 14–36)
BILIRUB DIRECT SERPL-MCNC: 0 MG/DL (ref 0–0.4)
BILIRUB SERPL-MCNC: 0.3 MG/DL (ref 0.2–1.3)
BUN SERPL-MCNC: 27 MG/DL (ref 7–20)
CALCIUM: 8.8 MG/DL (ref 8.4–10.2)
CHLORIDE SERPL-SCNC: 100 MMOL/L (ref 98–107)
CO2 SERPL-SCNC: 33 MMOL/L (ref 22–30)
GLUCOSE SERPL-MCNC: 174 MG/DL (ref 75–110)
PHOSPHATE SERPL-MCNC: 4.2 MG/DL (ref 2.5–4.5)
POTASSIUM SERPL-SCNC: 5.3 MMOL/L (ref 3.6–5)
PROT SERPL-MCNC: 4.9 G/DL (ref 6.3–8.2)

## 2020-01-21 RX ADMIN — INSULIN HUMAN SCH UNIT: 100 INJECTION, SOLUTION PARENTERAL at 21:58

## 2020-01-21 RX ADMIN — HYDROCORTISONE SODIUM SUCCINATE SCH MG: 100 INJECTION, POWDER, FOR SOLUTION INTRAMUSCULAR; INTRAVENOUS at 21:59

## 2020-01-21 RX ADMIN — Medication SCH: at 05:57

## 2020-01-21 RX ADMIN — FUROSEMIDE SCH MG: 20 TABLET ORAL at 08:39

## 2020-01-21 RX ADMIN — FLUTICASONE FUROATE, UMECLIDINIUM BROMIDE AND VILANTEROL TRIFENATATE SCH INH: 100; 62.5; 25 POWDER RESPIRATORY (INHALATION) at 10:28

## 2020-01-21 RX ADMIN — DOCUSATE SODIUM SCH MG: 100 CAPSULE, LIQUID FILLED ORAL at 10:27

## 2020-01-21 RX ADMIN — RIVAROXABAN SCH MG: 10 TABLET, FILM COATED ORAL at 10:27

## 2020-01-21 RX ADMIN — PATIROMER SCH GM: 8.4 POWDER, FOR SUSPENSION ORAL at 17:39

## 2020-01-21 RX ADMIN — INSULIN HUMAN SCH UNIT: 100 INJECTION, SOLUTION PARENTERAL at 16:18

## 2020-01-21 RX ADMIN — Medication SCH: at 21:22

## 2020-01-21 RX ADMIN — DOCUSATE SODIUM SCH: 100 CAPSULE, LIQUID FILLED ORAL at 17:39

## 2020-01-21 RX ADMIN — PREGABALIN SCH MG: 100 CAPSULE ORAL at 14:12

## 2020-01-21 RX ADMIN — SACUBITRIL AND VALSARTAN SCH TAB: 49; 51 TABLET, FILM COATED ORAL at 12:13

## 2020-01-21 RX ADMIN — SACUBITRIL AND VALSARTAN SCH TAB: 49; 51 TABLET, FILM COATED ORAL at 21:58

## 2020-01-21 RX ADMIN — LEVOTHYROXINE SODIUM SCH MG: 25 TABLET ORAL at 05:56

## 2020-01-21 RX ADMIN — HYDROCORTISONE SODIUM SUCCINATE SCH MG: 100 INJECTION, POWDER, FOR SOLUTION INTRAMUSCULAR; INTRAVENOUS at 05:55

## 2020-01-21 RX ADMIN — INSULIN HUMAN SCH UNIT: 100 INJECTION, SOLUTION PARENTERAL at 11:35

## 2020-01-21 RX ADMIN — PREGABALIN SCH MG: 100 CAPSULE ORAL at 17:38

## 2020-01-21 RX ADMIN — MAGNESIUM SULFATE IN DEXTROSE SCH: 10 INJECTION, SOLUTION INTRAVENOUS at 13:58

## 2020-01-21 RX ADMIN — HYDROCORTISONE SODIUM SUCCINATE SCH MG: 100 INJECTION, POWDER, FOR SOLUTION INTRAMUSCULAR; INTRAVENOUS at 14:12

## 2020-01-21 RX ADMIN — MAGNESIUM SULFATE IN DEXTROSE SCH MLS/HR: 10 INJECTION, SOLUTION INTRAVENOUS at 10:29

## 2020-01-21 RX ADMIN — GLIMEPIRIDE SCH MG: 1 TABLET ORAL at 10:27

## 2020-01-21 RX ADMIN — INSULIN HUMAN SCH UNIT: 100 INJECTION, SOLUTION PARENTERAL at 08:40

## 2020-01-21 RX ADMIN — SODIUM CHLORIDE, SODIUM LACTATE, POTASSIUM CHLORIDE, AND CALCIUM CHLORIDE PRN MLS/HR: .6; .31; .03; .02 INJECTION, SOLUTION INTRAVENOUS at 03:15

## 2020-01-21 RX ADMIN — MAGNESIUM SULFATE IN DEXTROSE SCH MLS/HR: 10 INJECTION, SOLUTION INTRAVENOUS at 05:56

## 2020-01-21 RX ADMIN — MAGNESIUM SULFATE IN DEXTROSE SCH MLS/HR: 10 INJECTION, SOLUTION INTRAVENOUS at 08:41

## 2020-01-21 RX ADMIN — PREGABALIN SCH MG: 100 CAPSULE ORAL at 10:27

## 2020-01-21 RX ADMIN — Medication SCH ML: at 14:13

## 2020-01-21 NOTE — PDOC CRITICAL CARE PROG REPORT
General


Date:: 01/21/20


ICU Day:: 2


Hospital Day:: 3


Resuscitation Status: Full Code


Events in the past 12 to 24 Hours:: 





Off levophed, on hydrocortisone, rehydrated. Kidney function better.


Review of systems relevant to events:: 





CV, endocrine.


Reason for ICU Addmission:: Hypotension, AMS, CAROL, need for levophed.





- Medications:


Medications reviewed and adjusted accordingly: Yes


Vasopressors:: 





None


Sedation:: 





None





Physical Exam


Vital Signs: 


                                        











Temp Pulse Resp BP Pulse Ox


 


 97.3 F   116 H  21 H  105/63   100 


 


 01/21/20 06:04  01/20/20 22:00  01/21/20 06:04  01/21/20 06:04  01/21/20 06:04








                                 Intake & Output











 01/20/20 01/21/20 01/22/20





 06:59 06:59 06:59


 


Intake Total 1533 2160 


 


Output Total 800 2685 


 


Balance 733 -525 


 


Weight 89.1 kg 104.5 kg 








                                  Weight/Height





Weight                           104.5 kg


Height                           5 ft








General appearance: PRESENT: no acute distress, morbidly obese, well-developed, 

well-nourished


Head exam: PRESENT: atraumatic, normocephalic


Eye exam: PRESENT: conjunctiva pink, EOMI, PERRLA.  ABSENT: scleral icterus


Ear exam: PRESENT: normal external ear exam


Mouth exam: PRESENT: moist, tongue midline


Respiratory exam: PRESENT: clear to auscultation renay.  ABSENT: rales, rhonchi, 

wheezes


Cardiovascular exam: PRESENT: tachycardia


Pulses: PRESENT: normal dorsalis pedis pul


GI/Abdominal exam: ABSENT: distended, guarding, mass, organolmegaly, rebound, 

tenderness


Rectal exam: PRESENT: deferred


Gentrourinary exam: PRESENT: indwelling catheter


Extremities exam: PRESENT: full ROM.  ABSENT: calf tenderness, clubbing, pedal 

edema


Neurological exam: PRESENT: alert, awake, oriented to person, oriented to place,

oriented to time, oriented to situation, CN II-XII grossly intact.  ABSENT: 

motor sensory deficit


Psychiatric exam: PRESENT: appropriate affect, normal mood.  ABSENT: homicidal 

ideation, suicidal ideation


Skin exam: PRESENT: abrasion, dry, intact, warm.  ABSENT: cyanosis, rash





Laboratory/Radiographs


Laboratory Results: 


                                        





                                 01/20/20 06:45 





                                 01/21/20 04:19 01/21/20





  04:19


 


Sodium  136.6 L


 


Potassium  5.3 H


 


Chloride  100


 


Carbon Dioxide  33 H


 


Anion Gap  4 L


 


BUN  27 H


 


Creatinine  1.14


 


Est GFR (African Amer)  56 L


 


Glucose  174 H


 


Calcium  8.8


 


Phosphorus  4.2


 


Magnesium  1.6








                                        











  01/19/20 01/20/20 01/20/20





  16:25 02:40 08:40


 


Troponin I  < 0.012  < 0.012  < 0.012














  01/20/20





  15:36


 


Troponin I  < 0.012











Impressions: 


                                        





Head CT  01/19/20 21:36


IMPRESSION: 


 


No acute intracranial abnormalities.


 


 


 


 








Chest X-Ray  01/20/20 06:00


IMPRESSION:  Unchanged radiographic appearance of the chest.


 











All labs, radiographs, diagnostic studies and EKGs were personally reviewed: Yes


In addition, reports of radiographic and diagnostic studies were read: Yes





Assessment and Plan





- Diagnosis


(1) CAROL (acute kidney injury)


Is this a current diagnosis for this admission?: Yes   


Plan: 


Resolved. Cr 1.14 GFR 47. Continues to improve. IVF stopped in deference to EF 

of 20%








(2) Altered mental status


Qualifiers: 


   Altered mental status type: delirium   Qualified Code(s): R41.0 - 

Disorientation, unspecified   


Is this a current diagnosis for this admission?: Yes   


Plan: 


Resolved








(3) Atrial fibrillation


Qualifiers: 


   Atrial fibrillation type: unspecified   Qualified Code(s): I48.91 - 

Unspecified atrial fibrillation   


Is this a current diagnosis for this admission?: Yes   


Plan: 


Resolved








(4) Chronic obstructive pulmonary disease (COPD)


Qualifiers: 


   COPD type: unspecified COPD   Qualified Code(s): J44.9 - Chronic obstructive 

pulmonary disease, unspecified   


Is this a current diagnosis for this admission?: Yes   


Plan: 


Severe but stable.








(5) Hyperkalemia


Is this a current diagnosis for this admission?: Yes   


Plan: 


Level 5.3. Hyperkalemia mostly resolved but still somewhat high. Should return 

to normal.








(6) Hypotension


Qualifiers: 


   Hypotension type: other hypotension type   Qualified Code(s): I95.89 - Other 

hypotension   


Is this a current diagnosis for this admission?: Yes   


Plan: 


Resolved with stress steroids. Off levophed X 24 hours.








(7) Morbid obesity with BMI of 40.0-44.9, adult


Is this a current diagnosis for this admission?: Yes   





(8) Acute on chronic diastolic congestive heart failure


Is this a current diagnosis for this admission?: Yes   


Plan: 


Resolved but still at risk with volume load and EF 25%








(9) Diabetes mellitus type 2 in obese


Is this a current diagnosis for this admission?: Yes   


Plan: 


Controlled.





Plan Summary: 





Downgraded to tele. Dr. Ruiz to see for medication adjustment. Entresto 

continued. Needs better rate control. Also needs PT and SNF rehab as home rehab 

did not work.





Critical Time


Critical Time (minutes): 30


Level of Care: TELE


Anticipated discharge: SNF


Within: within 72 hours


-: 


1.  The care of a critical patient is a dynamic process.  This note is a 

representative synopsis but static in nature.  The timeframe for treatments 

given in order is not necessarily the actual time these treatments may have been

done.





2.  This patient requires critical care secondary to ongoing requirements for 

therapy not offered or safe outside the critical care environment.  Transfer to 

a lower level of care will result in altered life or limb morbidity and 

mortality.





3.  Multidisciplinary rounds completed.





4.  ABCDE bundle addressed.

## 2020-01-22 LAB
ANION GAP SERPL CALC-SCNC: 2 MMOL/L (ref 5–19)
BUN SERPL-MCNC: 28 MG/DL (ref 7–20)
CALCIUM: 8.7 MG/DL (ref 8.4–10.2)
CHLORIDE SERPL-SCNC: 98 MMOL/L (ref 98–107)
CO2 SERPL-SCNC: 35 MMOL/L (ref 22–30)
FREE T4 (FREE THYROXINE): 0.78 NG/DL (ref 0.78–2.19)
GLUCOSE SERPL-MCNC: 190 MG/DL (ref 75–110)
PHOSPHATE SERPL-MCNC: 3.9 MG/DL (ref 2.5–4.5)
POTASSIUM SERPL-SCNC: 4.7 MMOL/L (ref 3.6–5)
T3FREE SERPL-MCNC: 1.53 PG/ML (ref 2.77–5.27)

## 2020-01-22 RX ADMIN — HYDROCORTISONE SODIUM SUCCINATE SCH MG: 100 INJECTION, POWDER, FOR SOLUTION INTRAMUSCULAR; INTRAVENOUS at 21:08

## 2020-01-22 RX ADMIN — HYDROCODONE BITARTRATE AND ACETAMINOPHEN PRN TAB: 5; 325 TABLET ORAL at 05:14

## 2020-01-22 RX ADMIN — PATIROMER SCH GM: 8.4 POWDER, FOR SUSPENSION ORAL at 16:32

## 2020-01-22 RX ADMIN — PREGABALIN SCH MG: 100 CAPSULE ORAL at 09:02

## 2020-01-22 RX ADMIN — PREGABALIN SCH MG: 100 CAPSULE ORAL at 18:26

## 2020-01-22 RX ADMIN — FUROSEMIDE SCH MG: 20 TABLET ORAL at 09:02

## 2020-01-22 RX ADMIN — DOCUSATE SODIUM SCH MG: 100 CAPSULE, LIQUID FILLED ORAL at 18:26

## 2020-01-22 RX ADMIN — ESMOLOL HYDROCHLORIDE PRN MLS/HR: 10 INJECTION INTRAVENOUS at 22:46

## 2020-01-22 RX ADMIN — Medication SCH: at 05:06

## 2020-01-22 RX ADMIN — ESMOLOL HYDROCHLORIDE PRN MLS/HR: 10 INJECTION INTRAVENOUS at 19:01

## 2020-01-22 RX ADMIN — Medication SCH: at 21:08

## 2020-01-22 RX ADMIN — HYDROCODONE BITARTRATE AND ACETAMINOPHEN PRN TAB: 5; 325 TABLET ORAL at 22:23

## 2020-01-22 RX ADMIN — INSULIN HUMAN SCH: 100 INJECTION, SOLUTION PARENTERAL at 11:14

## 2020-01-22 RX ADMIN — INSULIN HUMAN SCH UNIT: 100 INJECTION, SOLUTION PARENTERAL at 09:00

## 2020-01-22 RX ADMIN — DOCUSATE SODIUM SCH MG: 100 CAPSULE, LIQUID FILLED ORAL at 09:02

## 2020-01-22 RX ADMIN — Medication SCH ML: at 14:00

## 2020-01-22 RX ADMIN — INSULIN HUMAN SCH UNIT: 100 INJECTION, SOLUTION PARENTERAL at 22:44

## 2020-01-22 RX ADMIN — SACUBITRIL AND VALSARTAN SCH TAB: 49; 51 TABLET, FILM COATED ORAL at 22:23

## 2020-01-22 RX ADMIN — RIVAROXABAN SCH MG: 10 TABLET, FILM COATED ORAL at 09:02

## 2020-01-22 RX ADMIN — FLUTICASONE FUROATE, UMECLIDINIUM BROMIDE AND VILANTEROL TRIFENATATE SCH INH: 100; 62.5; 25 POWDER RESPIRATORY (INHALATION) at 09:10

## 2020-01-22 RX ADMIN — PREGABALIN SCH MG: 100 CAPSULE ORAL at 14:02

## 2020-01-22 RX ADMIN — SACUBITRIL AND VALSARTAN SCH TAB: 49; 51 TABLET, FILM COATED ORAL at 09:03

## 2020-01-22 RX ADMIN — INSULIN HUMAN SCH UNIT: 100 INJECTION, SOLUTION PARENTERAL at 16:29

## 2020-01-22 RX ADMIN — ESMOLOL HYDROCHLORIDE PRN MLS/HR: 10 INJECTION INTRAVENOUS at 13:52

## 2020-01-22 RX ADMIN — HYDROCORTISONE SODIUM SUCCINATE SCH MG: 100 INJECTION, POWDER, FOR SOLUTION INTRAMUSCULAR; INTRAVENOUS at 14:03

## 2020-01-22 RX ADMIN — LEVOTHYROXINE SODIUM SCH MG: 25 TABLET ORAL at 05:07

## 2020-01-22 RX ADMIN — HYDROCORTISONE SODIUM SUCCINATE SCH MG: 100 INJECTION, POWDER, FOR SOLUTION INTRAMUSCULAR; INTRAVENOUS at 05:06

## 2020-01-22 RX ADMIN — GLIMEPIRIDE SCH MG: 1 TABLET ORAL at 09:03

## 2020-01-22 NOTE — PDOC CRITICAL CARE PROG REPORT
General


Date:: 01/22/20


Hospital Day:: 3


Resuscitation Status: Full Code


Events in the past 12 to 24 Hours:: 





Started on IV amiodarone. HR still not controlled. Starting IV digoxin per Dr. Ruiz.


Review of systems relevant to events:: 





CV is only system.


Reason for ICU Addmission:: Hypotension, AMS, CAROL, need for levophed. Now off 

and on IV amiodarone.





- Medications:


Medications reviewed and adjusted accordingly: Yes


Vasopressors:: 





None


Sedation:: 





None.





Physical Exam


Vital Signs: 


                                        











Temp Pulse Resp BP Pulse Ox


 


 98.2 F   105 H  26 H  115/101 H  100 


 


 01/22/20 10:33  01/22/20 08:36  01/22/20 10:33  01/22/20 10:33  01/22/20 10:33








                                 Intake & Output











 01/21/20 01/22/20 01/23/20





 06:59 06:59 06:59


 


Intake Total 2260 1502 


 


Output Total 2685 2100 


 


Balance -425 -598 


 


Weight 104.5 kg 105.1 kg 








                                  Weight/Height





Weight                           105.1 kg


Height                           5 ft








General appearance: PRESENT: no acute distress, well-developed, well-nourished


Head exam: PRESENT: atraumatic, normocephalic


Eye exam: PRESENT: conjunctiva pink, EOMI, PERRLA.  ABSENT: scleral icterus


Ear exam: PRESENT: normal external ear exam


Mouth exam: PRESENT: moist, tongue midline


Respiratory exam: PRESENT: clear to auscultation renay.  ABSENT: rales, rhonchi, 

wheezes


Cardiovascular exam: PRESENT: irregular rhythm, tachycardia


Vascular exam: PRESENT: normal capillary refill


GI/Abdominal exam: PRESENT: normal bowel sounds, soft.  ABSENT: distended, guard

ing, mass, organolmegaly, rebound, tenderness


Rectal exam: PRESENT: deferred


Gentrourinary exam: PRESENT: indwelling catheter


Extremities exam: PRESENT: full ROM.  ABSENT: calf tenderness, clubbing, pedal 

edema


Musculoskeletal exam: PRESENT: normal inspection


Neurological exam: PRESENT: alert, awake, oriented to person, oriented to place,

oriented to time, oriented to situation, CN II-XII grossly intact.  ABSENT: 

motor sensory deficit


Skin exam: PRESENT: dry, intact, warm.  ABSENT: cyanosis, rash





Laboratory/Radiographs


Laboratory Results: 


                                        





                                 01/20/20 06:45 





                                 01/22/20 05:11 





                                        











  01/21/20 01/21/20 01/22/20





  20:29 20:29 05:11


 


Sodium    134.8 L


 


Potassium    4.7


 


Chloride    98


 


Carbon Dioxide    35 H


 


Anion Gap    2 L


 


BUN    28 H


 


Creatinine    1.11


 


Est GFR ( Amer)    58 L


 


Glucose    190 H


 


Calcium    8.7


 


Phosphorus    3.9


 


Magnesium    2.2


 


Total Bilirubin  0.3  


 


AST  16  


 


Alkaline Phosphatase  83  


 


Total Protein  4.9 L  


 


Albumin  2.6 L  


 


TSH   0.21 L 








                                        











  01/19/20 01/20/20 01/20/20





  16:25 02:40 08:40


 


Troponin I  < 0.012  < 0.012  < 0.012














  01/20/20





  15:36


 


Troponin I  < 0.012











Impressions: 


                                        





Head CT  01/19/20 21:36


IMPRESSION: 


 


No acute intracranial abnormalities.


 


 


 


 








Chest X-Ray  01/20/20 06:00


IMPRESSION:  Unchanged radiographic appearance of the chest.


 











All labs, radiographs, diagnostic studies and EKGs were personally reviewed: Yes


In addition, reports of radiographic and diagnostic studies were read: Yes





Assessment and Plan





- Diagnosis


(1) CAROL (acute kidney injury)


Is this a current diagnosis for this admission?: Yes   


Plan: 


Resolved








(2) Altered mental status


Qualifiers: 


   Altered mental status type: delirium   Qualified Code(s): R41.0 - 

Disorientation, unspecified   


Is this a current diagnosis for this admission?: Yes   


Plan: 


Resolved








(3) Atrial fibrillation


Qualifiers: 


   Atrial fibrillation type: unspecified   Qualified Code(s): I48.91 - 

Unspecified atrial fibrillation   


Is this a current diagnosis for this admission?: Yes   


Plan: 


This is the issue keeping her in the hospital. Rate is 120-130 on IV amiodarone 

and digoxin. When her rate is ideally below 100 or within reason we will 

consider discharge to rehab. For now she may go to [facility] [place]








(4) Chronic obstructive pulmonary disease (COPD)


Qualifiers: 


   COPD type: unspecified COPD   Qualified Code(s): J44.9 - Chronic obstructive 

pulmonary disease, unspecified   


Is this a current diagnosis for this admission?: Yes   


Plan: 


Stable.








(5) Hyperkalemia


Is this a current diagnosis for this admission?: Yes   


Plan: 


Resolved








(6) Morbid obesity with BMI of 40.0-44.9, adult


Is this a current diagnosis for this admission?: Yes   


Plan: 


Chronic








(7) Acute on chronic diastolic congestive heart failure


Is this a current diagnosis for this admission?: Yes   


Plan: 


Resolved








(8) Diabetes mellitus type 2 in obese


Is this a current diagnosis for this admission?: Yes   


Plan: 


Controlled.





Plan Summary: 





If IV digoxin not effective we will need Dr. Ruiz's input for further advice.





Critical Time


Critical Time (minutes): 40


Level of Care: IMCU


Anticipated discharge: SNF


Within: within 72 hours


-: 


1.  The care of a critical patient is a dynamic process.  This note is a 

representative synopsis but static in nature.  The timeframe for treatments 

given in order is not necessarily the actual time these treatments may have been

done.





2.  This patient requires critical care secondary to ongoing requirements for 

therapy not offered or safe outside the critical care environment.  Transfer to 

a lower level of care will result in altered life or limb morbidity and 

mortality.





3.  Multidisciplinary rounds completed.





4.  ABCDE bundle addressed.

## 2020-01-22 NOTE — PROGRESS NOTE
Provider Note


Provider Note: 





CARDIOLOGY PROGRESS NOTE by Dr. Nuvia Ramos on 1/22/2020.





OBJECTIVE: The patient continues to be in atrial flutter with a ventricular 

response of 120s.  No good response to IV amiodarone.  Hence will discontinue 

amiodarone and start the patient on esmolol drip.  The patient denies any chest 

pain or discomfort.  She has no PND orthopnea.  She is awake alert although she 

has difficulty getting his thoughts straight into speech.  There is no TIA CVA 

symptoms.  There is no ventricle arrhythmia seen on the monitor.  The patient 

denies any chest pain or discomfort.





PHYSICAL EXAMINATION: The patient is morbidly obese.  In no acute distress.


                                                                Selected Entries











  01/22/20 01/22/20





  13:17 16:00


 


Temperature  99.3 F


 


Temperature  Core





Source  


 


Pulse Rate  124 H


 


Respiratory  18





Rate  


 


Blood Pressure  114/89 H





[Left Upper Arm  





]  


 


Blood Pressure  97





Mean [Left  





Upper Arm]  


 


Blood Pressure  Supine





Position [Left  





Upper Arm]  


 


O2 Sat by Pulse  96





Oximetry  


 


Oxygen Delivery  Nasal Cannula





Method (  





includes room  





air)  


 


Oxygen Flow 2 





Rate  





HEAD: Is atraumatic.  Normocephalic.  EYES: Pupils are equal round regular 

reactive to light and accommodation.  Extraocular movements are normal there is 

no conjunctival pallor.  There is no scleral icterus.  EARS: Tympanic membranes 

are intact.  External auditory canals are clear.  NOSE: There is no deviated 

nasal septum.  There is no inflammation of the nasal mucous membrane.  MOUTH: 

There is no ulcers in the mouth.  Mucous membranes of the mouth are moist.  

THROAT: There is no redness of the oropharynx.  There is no exudates.  SKIN: 

There is no skin rashes.  There is no petechia or ecchymosis.  NECK: Is supple. 

There is mild JVD present.  Carotids are equal there is no bruit.  There is no 

lymphadenopathy.  There is no goiter.  There is no accessory muscle respiration 

use.  Trachea is central.  LUNGS: Shows diminished air entry prolonged 

expiration.  T.  There a few "Velcro" [E] rales of pulmonary fibrosis in the 

right base..  There is a few fine rales of CHF.  HEART: S1-S2 is heard.  There 

is no S3 gallop.  There is no S4 gallop.  There is systolic murmur of tricuspid 

regurgitation and mild mitral regurgitation murmur present.  There is no S3 

gallop.  There is no S4 gallop.  There is no rub.  ABDOMEN: Is obese.  

Nontender.  There is no paraspinal megaly.  Bowel sounds are well heard.  

EXTREMITIES: Femorals are deep.  Femorals are diminished there is no femoral 

bruits.  Leg pulses are diminished.  There is 1+ bilateral edema.  There is no 

DVT or cellulitis.  There is no cyanosis or clubbing.  There is no calf 

tenderness.  CNS: The patient is conscious awake alert oriented x3 with no focal

 deficit.  PSYCHIATRIC:.  The patient judgment insight are intact her affect is 

normal.





                              Labs- All tests 24 hr











  01/21/20 01/21/20 01/21/20





  20:29 20:29 21:49


 


Sodium   


 


Potassium   


 


Chloride   


 


Carbon Dioxide   


 


Anion Gap   


 


BUN   


 


Creatinine   


 


Est GFR ( Amer)   


 


Est GFR (MDRD) Non-Af   


 


Glucose   


 


POC Glucose    193 H


 


Calcium   


 


Phosphorus   


 


Magnesium   


 


Total Bilirubin  0.3  


 


Direct Bilirubin  0.0  


 


Neonat Total Bilirubin  Not Reportable  


 


Neonat Direct Bilirubin  Not Reportable  


 


Neonat Indirect Bili  Not Reportable  


 


AST  16  


 


ALT  20  


 


Alkaline Phosphatase  83  


 


Total Protein  4.9 L  


 


Albumin  2.6 L  


 


TSH   0.21 L 


 


Free T4   


 


Free T3 pg/mL   














  01/22/20 01/22/20 01/22/20





  05:11 08:14 10:31


 


Sodium  134.8 L  


 


Potassium  4.7  


 


Chloride  98  


 


Carbon Dioxide  35 H  


 


Anion Gap  2 L  


 


BUN  28 H  


 


Creatinine  1.11  


 


Est GFR ( Amer)  58 L  


 


Est GFR (MDRD) Non-Af  48 L  


 


Glucose  190 H  


 


POC Glucose   207 H  122 H


 


Calcium  8.7  


 


Phosphorus  3.9  


 


Magnesium  2.2  


 


Total Bilirubin   


 


Direct Bilirubin   


 


Neonat Total Bilirubin   


 


Neonat Direct Bilirubin   


 


Neonat Indirect Bili   


 


AST   


 


ALT   


 


Alkaline Phosphatase   


 


Total Protein   


 


Albumin   


 


TSH   


 


Free T4   


 


Free T3 pg/mL   














  01/22/20 01/22/20





  10:34 16:23


 


Sodium  


 


Potassium  


 


Chloride  


 


Carbon Dioxide  


 


Anion Gap  


 


BUN  


 


Creatinine  


 


Est GFR ( Amer)  


 


Est GFR (MDRD) Non-Af  


 


Glucose  


 


POC Glucose   198 H


 


Calcium  


 


Phosphorus  


 


Magnesium  


 


Total Bilirubin  


 


Direct Bilirubin  


 


Neonat Total Bilirubin  


 


Neonat Direct Bilirubin  


 


Neonat Indirect Bili  


 


AST  


 


ALT  


 


Alkaline Phosphatase  


 


Total Protein  


 


Albumin  


 


TSH  


 


Free T4  0.78 


 


Free T3 pg/mL  1.53 L 








                                        





Chest X-Ray  01/19/20 16:04


IMPRESSION:  Cardiomegaly and mild central vascular congestion.  Elevation of 

the right hemidiaphragm.


 








Head CT  01/19/20 21:36


IMPRESSION: 


 


No acute intracranial abnormalities.


 


 


 


 








Chest X-Ray  01/20/20 01:40


IMPRESSION:


 


Satisfactory placement of central venous catheter


 


 


copyright 2011 [employer]- All Rights Reserved


 








Chest X-Ray  01/20/20 06:00


IMPRESSION:  Unchanged radiographic appearance of the chest.


 








MONITOR strip shows atrial flutter with right branch block pattern and left 

anterior fascicular block.








IMPRESSION/RECOMMENDATION:





1.  Atrial flutter with fast ventricular response.  We will start the patient on

 esmolol.  If the patient continues to be in atrial flutter with rapid 

ventricular response then will in addition start the restart the patient's 

amiodarone in the a.m. and after a few hours of the combination of esmolol and 

amiodarone drip/infusion will set up the patient for elective cardioversion with

 anesthesia managing the patient's deep conscious sedation and airway.  Continue

 Xarelto.  Note that the patient has been uninterruptedly on Xarelto for about a

 year.


2.  Acute on chronic hypoxic respiratory failure: Seems to be improved.  

Continue current treatment.


3.  ACute on chronic kidney disease: GFR is improving.  Avoid nephrotoxic drugs.


4.  History of asthma/COPD: Continue current treatment.


5.  History of pulmonary fibrosis.


6.  Hypertension: Blood pressure now is much improved.  The patient was 

hypotensive on admission on pressors.  This has been weaned off.  Patient blood 

pressure stable without any pressors.


7.  Diabetes mellitus: Continue antidiabetic medication and Accu-Cheks as per 

protocol.


8.  Hypothyroidism: Thyroid level seems to be optimal.


9.  Cardiomyopathy with moderately reduced LV ejection fraction.  Continue 

esmolol later transition to Toprol-XL.  Continue Entresto.


10.  Moderate to severe tricuspid regurgitation with moderate pulmonary 

hypertension.





Medications reviewed.  Medical Acac[place] regimen and management plan discussed 

with intensivist.  Medical decision making is of high complexity.  50 minutes 

spent as patient more than 50% of time spent direct patient care.  Discussed wit

h the patient and patient's daughter the the management plan including possible 

cardioversion tomorrow.  Will follow

## 2020-01-23 LAB
ANION GAP SERPL CALC-SCNC: 1 MMOL/L (ref 5–19)
BUN SERPL-MCNC: 23 MG/DL (ref 7–20)
CALCIUM: 8 MG/DL (ref 8.4–10.2)
CHLORIDE SERPL-SCNC: 99 MMOL/L (ref 98–107)
CO2 SERPL-SCNC: 38 MMOL/L (ref 22–30)
GLUCOSE SERPL-MCNC: 137 MG/DL (ref 75–110)
POTASSIUM SERPL-SCNC: 3.7 MMOL/L (ref 3.6–5)

## 2020-01-23 PROCEDURE — 5A2204Z RESTORATION OF CARDIAC RHYTHM, SINGLE: ICD-10-PCS | Performed by: SPECIALIST

## 2020-01-23 RX ADMIN — GLIMEPIRIDE SCH MG: 1 TABLET ORAL at 09:30

## 2020-01-23 RX ADMIN — ESMOLOL HYDROCHLORIDE PRN MLS/HR: 10 INJECTION INTRAVENOUS at 03:02

## 2020-01-23 RX ADMIN — INSULIN HUMAN SCH: 100 INJECTION, SOLUTION PARENTERAL at 08:43

## 2020-01-23 RX ADMIN — INSULIN HUMAN SCH UNIT: 100 INJECTION, SOLUTION PARENTERAL at 11:24

## 2020-01-23 RX ADMIN — PREGABALIN SCH MG: 100 CAPSULE ORAL at 17:22

## 2020-01-23 RX ADMIN — ESMOLOL HYDROCHLORIDE PRN MLS/HR: 10 INJECTION INTRAVENOUS at 08:20

## 2020-01-23 RX ADMIN — PREGABALIN SCH MG: 100 CAPSULE ORAL at 09:30

## 2020-01-23 RX ADMIN — PATIROMER SCH: 8.4 POWDER, FOR SUSPENSION ORAL at 17:22

## 2020-01-23 RX ADMIN — Medication SCH: at 13:22

## 2020-01-23 RX ADMIN — DOCUSATE SODIUM SCH MG: 100 CAPSULE, LIQUID FILLED ORAL at 09:30

## 2020-01-23 RX ADMIN — HYDROCORTISONE SODIUM SUCCINATE SCH MG: 100 INJECTION, POWDER, FOR SOLUTION INTRAMUSCULAR; INTRAVENOUS at 13:33

## 2020-01-23 RX ADMIN — PREGABALIN SCH: 100 CAPSULE ORAL at 13:24

## 2020-01-23 RX ADMIN — HYDROCORTISONE SODIUM SUCCINATE SCH MG: 100 INJECTION, POWDER, FOR SOLUTION INTRAMUSCULAR; INTRAVENOUS at 21:05

## 2020-01-23 RX ADMIN — INSULIN HUMAN SCH UNIT: 100 INJECTION, SOLUTION PARENTERAL at 21:19

## 2020-01-23 RX ADMIN — SACUBITRIL AND VALSARTAN SCH TAB: 49; 51 TABLET, FILM COATED ORAL at 09:30

## 2020-01-23 RX ADMIN — SACUBITRIL AND VALSARTAN SCH TAB: 49; 51 TABLET, FILM COATED ORAL at 21:05

## 2020-01-23 RX ADMIN — Medication SCH: at 21:06

## 2020-01-23 RX ADMIN — LEVOTHYROXINE SODIUM SCH MG: 25 TABLET ORAL at 06:15

## 2020-01-23 RX ADMIN — HYDROCORTISONE SODIUM SUCCINATE SCH MG: 100 INJECTION, POWDER, FOR SOLUTION INTRAMUSCULAR; INTRAVENOUS at 06:15

## 2020-01-23 RX ADMIN — DOCUSATE SODIUM SCH MG: 100 CAPSULE, LIQUID FILLED ORAL at 17:22

## 2020-01-23 RX ADMIN — INSULIN HUMAN SCH: 100 INJECTION, SOLUTION PARENTERAL at 16:46

## 2020-01-23 RX ADMIN — FLUTICASONE FUROATE, UMECLIDINIUM BROMIDE AND VILANTEROL TRIFENATATE SCH INH: 100; 62.5; 25 POWDER RESPIRATORY (INHALATION) at 09:32

## 2020-01-23 RX ADMIN — FUROSEMIDE SCH MG: 20 TABLET ORAL at 08:50

## 2020-01-23 RX ADMIN — Medication SCH: at 05:26

## 2020-01-23 RX ADMIN — RIVAROXABAN SCH MG: 10 TABLET, FILM COATED ORAL at 09:30

## 2020-01-23 RX ADMIN — HYDROCODONE BITARTRATE AND ACETAMINOPHEN PRN TAB: 5; 325 TABLET ORAL at 21:05

## 2020-01-23 NOTE — EKG REPORT
SEVERITY:- ABNORMAL ECG -

SINUS RHYTHM

FIRST DEGREE AV BLOCK

RBBB AND LAFB

:

Confirmed by: Chaitanya Mendez MD 23-Jan-2020 18:26:13

## 2020-01-23 NOTE — PROGRESS NOTE
Provider Note


Provider Note: 





PROCEDURE NOTE BY DR. YOVANA FIGUEROA on 1/23/2020.





PROCEDURE: Elective DC synchronized cardioversion of atrial flutter to sinus 

mechanism.





Informed consent was obtained from the patient and the patient's daughter after 

explaining the procedure of cardioversion with anesthesia giving conscious 

sedation and managing the airway.  The benefits and risks and complications have

been discussed.  Complications which are limited which are but not limited to 

CVA, development of ventricular arrhythmias requiring CPR or repeat 

defibrillation, need for temporary or permanent pacemaker for symptomatic 

bradycardia, need for CPR, and skin burns of all been discussed with him in 

detail and informed consent was obtained.





PROCEDURE: With anesthesia giving the patient sedation with propofol and 

managing patient's airway when the patient was deeply sedated the patient was 

given 1 synchronized DC biphasic shock of 150 J on the pads that were applied to

the chest.  The patient converted to sinus rhythm.  Subsequently the patient 

awakened with no focal deficits and no evidence of CVA.  She was awake alert 

oriented x3 and was moving all 4 extremities.  Also blood pressure was stable.  

The patient has been restarted on esmolol at 12.5 mcg/kg/min.  Later we will see

if we can increase this.  There is no evidence of any complication of the 

cardioversion.





Impression successful synchronized DC cardioversion of atrial flutter to sinus 

rhythm using 150 J of biphasic DC steph

## 2020-01-23 NOTE — PDOC CRITICAL CARE PROG REPORT
General


Date:: 01/23/20


ICU Day:: 3


Hospital Day:: 3


Resuscitation Status: Full Code


Events in the past 12 to 24 Hours:: 


Patient is in a regular appearing rhythm but is still in atrial flutter with no 

change in HR.


Review of systems relevant to events:: 





CV and pulmonary.


Reason for ICU Addmission:: Hypotension, AMS, CAROL, need for levophed. Now off 

and on IV amiodarone and esmolol.





- Medications:


Medications reviewed and adjusted accordingly: Yes


Vasopressors:: 





None


Sedation:: 





None





Physical Exam


Vital Signs: 


                                        











Temp Pulse Resp BP Pulse Ox


 


 97.9 F   125 H  17   86/58 L  100 


 


 01/23/20 08:00  01/23/20 10:00  01/23/20 10:00  01/23/20 10:00  01/23/20 10:00








                                 Intake & Output











 01/22/20 01/23/20 01/24/20





 06:59 06:59 06:59


 


Intake Total 1502 2245 105


 


Output Total 2100 975 330


 


Balance -598 1270 -225


 


Weight 105.1 kg 105.8 kg 








                                  Weight/Height





Weight                           105.8 kg


Height                           5 ft








General appearance: PRESENT: no acute distress, morbidly obese, well-developed, 

well-nourished


Head exam: PRESENT: atraumatic, normocephalic


Eye exam: PRESENT: conjunctiva pink, EOMI, PERRLA.  ABSENT: scleral icterus


Ear exam: PRESENT: normal external ear exam


Mouth exam: PRESENT: moist, tongue midline


Respiratory exam: PRESENT: clear to auscultation renay, decreased breath sounds.  

ABSENT: rales, rhonchi, wheezes


Cardiovascular exam: PRESENT: irregular rhythm, tachycardia


Vascular exam: PRESENT: normal capillary refill


GI/Abdominal exam: PRESENT: normal bowel sounds, soft.  ABSENT: distended, gua

rding, mass, organolmegaly, rebound, tenderness


Rectal exam: PRESENT: deferred


Gentrourinary exam: PRESENT: indwelling catheter


Extremities exam: PRESENT: full ROM.  ABSENT: calf tenderness, clubbing, pedal 

edema


Neurological exam: PRESENT: alert, awake, oriented to person, oriented to place,

oriented to time, oriented to situation, CN II-XII grossly intact.  ABSENT: 

motor sensory deficit


Psychiatric exam: PRESENT: appropriate affect, normal mood.  ABSENT: homicidal 

ideation, suicidal ideation


Skin exam: PRESENT: dry, intact, warm.  ABSENT: cyanosis, rash


Tubes/Lines: PRESENT: Central Line





Laboratory/Radiographs


Laboratory Results: 


                                        





                                 01/20/20 06:45 





                                 01/22/20 05:11 





                                        











  01/22/20





  10:34


 


Free T4  0.78


 


Free T3 pg/mL  1.53 L








                                        











  01/19/20 01/20/20 01/20/20





  16:25 02:40 08:40


 


Troponin I  < 0.012  < 0.012  < 0.012














  01/20/20





  15:36


 


Troponin I  < 0.012











Impressions: 


                                        





Head CT  01/19/20 21:36


IMPRESSION: 


 


No acute intracranial abnormalities.


 


 


 


 








Chest X-Ray  01/20/20 06:00


IMPRESSION:  Unchanged radiographic appearance of the chest.


 











All labs, radiographs, diagnostic studies and EKGs were personally reviewed: Yes


In addition, reports of radiographic and diagnostic studies were read: Yes





Assessment and Plan





- Diagnosis


(1) Atrial fibrillation


Qualifiers: 


   Atrial fibrillation type: unspecified   Qualified Code(s): I48.91 - 

Unspecified atrial fibrillation   


Is this a current diagnosis for this admission?: Yes   


Plan: 


No permanent results from esmolol or amiodarone. To get a DC cardioversion by 

Dr. Ruiz today around 4:30. Is NPO.








(2) Chronic obstructive pulmonary disease (COPD)


Qualifiers: 


   COPD type: unspecified COPD   Qualified Code(s): J44.9 - Chronic obstructive 

pulmonary disease, unspecified   


Is this a current diagnosis for this admission?: Yes   


Plan: 


Currently stable. On home inhalers.








(3) Hyperkalemia


Is this a current diagnosis for this admission?: Yes   





(4) Morbid obesity with BMI of 40.0-44.9, adult


Is this a current diagnosis for this admission?: Yes   


Plan: 


Chronic








(5) Acute on chronic diastolic congestive heart failure


Is this a current diagnosis for this admission?: Yes   


Plan: 


Stable even with this HR.








(6) Diabetes mellitus type 2 in obese


Is this a current diagnosis for this admission?: Yes   


Plan: 


Controlled.





Plan Summary: 





Cardioversion today. If successful may discharge in AM to rehab.





Critical Time


Critical Time (minutes): 30


Level of Care: ICU


Anticipated discharge: SNF


Within: within 24 hours


-: 


1.  The care of a critical patient is a dynamic process.  This note is a 

representative synopsis but static in nature.  The timeframe for treatments 

given in order is not necessarily the actual time these treatments may have been

done.





2.  This patient requires critical care secondary to ongoing requirements for 

therapy not offered or safe outside the critical care environment.  Transfer to 

a lower level of care will result in altered life or limb morbidity and 

mortality.





3.  Multidisciplinary rounds completed.





4.  ABCDE bundle addressed.

## 2020-01-23 NOTE — PROGRESS NOTE
Provider Note


Provider Note: 





CARDIOLOGY PROGRESS NOTE by Dr. Nuvia Abdul on 1/23/2020.  Note that the 

patient was seen in the morning around 9:30 AM and subsequently the patient was 

seen in the evening around 1600 when she was cardioverted to sinus rhythm and 

the patient was again examined.  The patient earlier this morning was in atrial 

flutter with a ventricular response of 120 bpm.  She had transiently come down 

to 60 bpm with the rhythm still being atrial flutter last night.  But with 

100mcg/kg/min of esmolol her blood pressure went into the 70s.  Has esmolol was 

decreased to 25 mcg/kg/min.  Subsequently patient's heart rate went back into 

124.  The patient was continued on the esmolol at a rate of 25 mg/kg/min and 

also the patient was started on amiodarone drip at 0.5 mg per minute.  With this

the patient's heart rate went down into the 60s to 70s with the patient still 

being atrial flutter.  The patient with anesthesia giving giving conscious 

sedation and managing the airway the patient successfully cardioverted with 150 

J of biphasic synchronized DC current shock.  The patient is EKG subsequently 

showed sinus rhythm with first-degree AV block.  Left anterior hemiblock and 

right bundle branch block pattern.  The patient has no ill effects or side 

effects or complications of the cardioversion.  She is awake alert oriented x3 

with no focal deficits and moves all 4 extremities.  With the patient being in 

sinus rhythm her blood pressure is improved.





PHYSICAL EXAMINATION: The patient morbidly obese.  In no acute distress.


                                                                Selected Entries











  01/23/20 01/23/20





  17:06 18:00


 


Temperature 98.2 F 98.4 F


 


Pulse Rate  74


 


Heart Rate ( 65 84





Monitors)  


 


Respiratory 18 19





Rate  


 


Blood Pressure 161/73 H 


 


Blood Pressure  131/62 H





[Left Upper Arm  





]  


 


Blood Pressure 102 





Mean  


 


Blood Pressure  85





Mean [Left  





Upper Arm]  


 


Blood Pressure  Supine





Position [Left  





Upper Arm]  


 


O2 Sat by Pulse 98 97





Oximetry  


 


Oxygen Delivery  Nasal Cannula





Method (  





includes room  





air)  











HEAD: Is atraumatic.  Normocephalic.  EYES: Pupils are equal round regular 

reactive to light and accommodation.  Extraocular movements are normal there is 

no conjunctival pallor.  There is no scleral icterus.  EARS: Tympanic membranes 

are intact.  External auditory canals are clear.  NOSE: There is no deviated 

nasal septum.  There is no inflammation of the nasal mucous membrane.  MOUTH: 

There is no ulcers in the mouth.  Mucous membranes of the mouth are moist.  

THROAT: There is no redness of the oropharynx.  There is no exudates.  SKIN: 

There is no skin rashes.  There is no petechia or ecchymosis.  NECK: Is supple. 

There is mild JVD present.  Carotids are equal there is no bruit.  There is no 

lymphadenopathy.  There is no goiter.  There is no accessory muscle respiration 

use.  Trachea is central.  LUNGS: Shows diminished air entry prolonged 

expiration.  T.  There a few "Velcro" [E] rales of pulmonary fibrosis in the 

right base.. There are no rales of CHF..  HEART: S1-S2 is heard.  S1 now is of 

normal intensity.  There is no S3 gallop.  There is no S4 gallop.  There is syst

olic murmur of tricuspid regurgitation and mild mitral regurgitation murmur 

present.  There is no S3 gallop.  There is no S4 gallop.  There is no rub.  

ABDOMEN: Is obese.  Nontender.  There is no paraspinal megaly.  Bowel sounds are

 well heard.  EXTREMITIES: Femorals are deep.  Femorals are diminished there is 

no femoral bruits.  Leg pulses are diminished.  There is 1+ bilateral edema.  

There is no DVT or cellulitis.  There is no cyanosis or clubbing.  There is no 

calf tenderness.  CNS: The patient is conscious awake alert oriented x3 with no 

focal deficit.  PSYCHIATRIC:.  The patient judgment insight are intact her 

affect is normal.





IMPRESSION/RECOMMENDATION:





 1.  Atrial flutter with fast ventricular response earlier this morning.  The 

patient's heart rate was controlled with a combination of esmolol drip and 

amiodarone.  Subsequently the patient has been cardioverted to sinus rhythm.  

The patient is on a very small dose of esmolol drip continuously.  The amnio 

drip has been discontinued.  Will later increase the patient's esmolol infusion.

  Continue Xarelto.   Later tomorrow we will see if we can put the patient on 

sotalol to keep the patient in sinus rhythm.  Subsequently if the patient 

remains in sinus rhythm we will repeat the patient's echo for surely the LV 

ejection fraction should have improved remarkably..


2.  Acute on chronic hypoxic respiratory failure: Seems to be improved.  

Continue current treatment.


3.  ACute on chronic kidney disease: GFR is improving.  Avoid nephrotoxic drugs.


4.  History of asthma/COPD: Continue current treatment.


5.  History of pulmonary fibrosis.


6.  Hypertension: Blood pressure now is much improved.  The patient was 

hypotensive on admission on pressors.  This has been weaned off.  Patient blood 

pressure stable without any pressors.


7.  Diabetes mellitus: Continue antidiabetic medication and Accu-Cheks as per 

protocol.


8.  Hypothyroidism: Thyroid level seems to be optimal.


9.  Cardiomyopathy with moderately reduced LV ejection fraction.  Continue 

esmolol later transition to Toprol-XL.  Continue Entresto.


10.  Moderate to severe tricuspid regurgitation with moderate pulmonary 

hypertension


11.  Successful elective DC synchronized cardioversion of the patient, atrial 

flutter to sinus rhythm.





Medications reviewed.  Medical regimen and management plan discussed with 

intensivist.  Medical decision making is of high complexity.  More than 60 

minutes spent as patient more than 50% of time spent on direct patient care.  

Note the 60 minutes was divided into 30 minutes in the morning and about 10 

minutes for the cardioversion and 20 minutes later after the cardioversion.  

Will follow

## 2020-01-23 NOTE — EKG REPORT
SEVERITY:- ABNORMAL ECG -

A-FLUTTER W/ PREDOM 3:1 AV BLOCK, A-RATE 254

RBBB AND LAFB

:

Confirmed by: Chaitanya Mendez MD 23-Jan-2020 18:26:37

## 2020-01-24 LAB
ALBUMIN SERPL-MCNC: 2.4 G/DL (ref 3.5–5)
ALP SERPL-CCNC: 64 U/L (ref 38–126)
ANION GAP SERPL CALC-SCNC: 0 MMOL/L (ref 5–19)
AST SERPL-CCNC: 13 U/L (ref 14–36)
BILIRUB DIRECT SERPL-MCNC: 0 MG/DL (ref 0–0.4)
BILIRUB SERPL-MCNC: 0.2 MG/DL (ref 0.2–1.3)
BUN SERPL-MCNC: 25 MG/DL (ref 7–20)
CALCIUM: 7.9 MG/DL (ref 8.4–10.2)
CHLORIDE SERPL-SCNC: 102 MMOL/L (ref 98–107)
CO2 SERPL-SCNC: 37 MMOL/L (ref 22–30)
GLUCOSE SERPL-MCNC: 134 MG/DL (ref 75–110)
POTASSIUM SERPL-SCNC: 3.8 MMOL/L (ref 3.6–5)
PROT SERPL-MCNC: 4.5 G/DL (ref 6.3–8.2)

## 2020-01-24 RX ADMIN — DOCUSATE SODIUM SCH MG: 100 CAPSULE, LIQUID FILLED ORAL at 09:29

## 2020-01-24 RX ADMIN — Medication SCH ML: at 21:10

## 2020-01-24 RX ADMIN — HYDROCORTISONE SODIUM SUCCINATE SCH MG: 100 INJECTION, POWDER, FOR SOLUTION INTRAMUSCULAR; INTRAVENOUS at 06:02

## 2020-01-24 RX ADMIN — HYDROCODONE BITARTRATE AND ACETAMINOPHEN PRN TAB: 5; 325 TABLET ORAL at 06:08

## 2020-01-24 RX ADMIN — FUROSEMIDE SCH MG: 20 TABLET ORAL at 09:29

## 2020-01-24 RX ADMIN — PREGABALIN SCH MG: 100 CAPSULE ORAL at 17:17

## 2020-01-24 RX ADMIN — INSULIN HUMAN SCH UNIT: 100 INJECTION, SOLUTION PARENTERAL at 17:16

## 2020-01-24 RX ADMIN — DOCUSATE SODIUM SCH MG: 100 CAPSULE, LIQUID FILLED ORAL at 17:17

## 2020-01-24 RX ADMIN — INSULIN HUMAN SCH: 100 INJECTION, SOLUTION PARENTERAL at 08:32

## 2020-01-24 RX ADMIN — INSULIN HUMAN SCH: 100 INJECTION, SOLUTION PARENTERAL at 11:49

## 2020-01-24 RX ADMIN — LEVOTHYROXINE SODIUM SCH MG: 25 TABLET ORAL at 06:02

## 2020-01-24 RX ADMIN — SACUBITRIL AND VALSARTAN SCH TAB: 49; 51 TABLET, FILM COATED ORAL at 09:29

## 2020-01-24 RX ADMIN — HYDROCORTISONE SODIUM SUCCINATE SCH MG: 100 INJECTION, POWDER, FOR SOLUTION INTRAMUSCULAR; INTRAVENOUS at 14:37

## 2020-01-24 RX ADMIN — Medication SCH: at 05:50

## 2020-01-24 RX ADMIN — HYDROCORTISONE SODIUM SUCCINATE SCH MG: 100 INJECTION, POWDER, FOR SOLUTION INTRAMUSCULAR; INTRAVENOUS at 21:10

## 2020-01-24 RX ADMIN — Medication SCH: at 14:34

## 2020-01-24 RX ADMIN — PREGABALIN SCH MG: 100 CAPSULE ORAL at 14:37

## 2020-01-24 RX ADMIN — RIVAROXABAN SCH MG: 10 TABLET, FILM COATED ORAL at 09:28

## 2020-01-24 RX ADMIN — PREGABALIN SCH MG: 100 CAPSULE ORAL at 09:29

## 2020-01-24 RX ADMIN — SACUBITRIL AND VALSARTAN SCH TAB: 49; 51 TABLET, FILM COATED ORAL at 21:10

## 2020-01-24 RX ADMIN — FLUTICASONE FUROATE, UMECLIDINIUM BROMIDE AND VILANTEROL TRIFENATATE SCH INH: 100; 62.5; 25 POWDER RESPIRATORY (INHALATION) at 09:28

## 2020-01-24 RX ADMIN — INSULIN HUMAN SCH UNIT: 100 INJECTION, SOLUTION PARENTERAL at 21:09

## 2020-01-24 RX ADMIN — GLIMEPIRIDE SCH MG: 1 TABLET ORAL at 09:28

## 2020-01-24 NOTE — PROGRESS NOTE
Provider Note


Provider Note: 





CARDIOLOGY PROGRESS NOTE by Dr. Nuvia Ramos on 1/24/2020.





SUBJECTIVE: The patient remains in sinus bradycardia.  Unable to start the 

patient on a beta-blocker or sotalol in view of the patient's heart rate being 

anywhere from 40-60.  She is asymptomatic.  There is no ventricular arrhythmia 

seen on the monitor.  The patient denies any chest pain or discomfort.  There is

no PND orthopnea.  A limited echo follow-up shows that the LV ejection fraction 

has improved to about 60%.  She still has moderate pulmonary hypertension with 

right ventricle systolic pressure of 51 to 56 mmHg.  The study is not a very 

good study in view of the patient's body habitus.  The patient denies any 

shortness of breath.  There is no PND orthopnea wheezing.  Her blood pressure is

stable without any pressors.





PHYSICAL EXAMINATION: The patient is morbidly obese.  In no acute distress.  She

is well-groomed.


                                                                Selected Entries











  01/24/20 01/24/20 01/24/20





  10:00 17:20 17:21


 


Temperature 98.1 F 98.2 F 


 


Pulse Rate 79  


 


Heart Rate (  45 52





Monitors)   


 


Respiratory 17  19





Rate   


 


Blood Pressure   158/81 H


 


Blood Pressure 118/58 L  





[Left Upper Arm   





]   


 


Blood Pressure   106





Mean   


 


Blood Pressure 78  





Mean [Left   





Upper Arm]   


 


Blood Pressure Supine  





Position [Left   





Upper Arm]   


 


Blood Pressure 118  





Systolic [Left   





Upper Arm]   


 


O2 Sat by Pulse 98  100





Oximetry   


 


Oxygen Delivery Nasal Cannula  





Method (   





includes room   





air)   


 


Oxygen Flow 3  





Rate   














  01/24/20





  17:35


 


Temperature 98.2 F


 


Pulse Rate 


 


Heart Rate ( 73





Monitors) 


 


Respiratory 





Rate 


 


Blood Pressure 


 


Blood Pressure 





[Left Upper Arm 





] 


 


Blood Pressure 





Mean 


 


Blood Pressure 





Mean [Left 





Upper Arm] 


 


Blood Pressure 





Position [Left 





Upper Arm] 


 


Blood Pressure 





Systolic [Left 





Upper Arm] 


 


O2 Sat by Pulse 





Oximetry 


 


Oxygen Delivery 





Method ( 





includes room 





air) 


 


Oxygen Flow 





Rate 





HEAD: Is atraumatic.  Normocephalic.  EYES: Pupils are equal round regular 

reactive to light and accommodation.  Extraocular movements are normal there is 

no conjunctival pallor.  There is no scleral icterus.  EARS: Tympanic membranes 

are intact.  External auditory canals are clear.  NOSE: There is no deviated 

nasal septum.  There is no inflammation of the nasal mucous membrane.  MOUTH: 

There is no ulcers in the mouth.  Mucous membranes of the mouth are moist.  

THROAT: There is no redness of the oropharynx.  There is no exudates.  SKIN: 

There is no skin rashes.  There is no petechia or ecchymosis.  NECK: Is supple. 

There is mild JVD present.  Carotids are equal there is no bruit.  There is no 

lymphadenopathy.  There is no goiter.  There is no accessory muscle respiration 

use.  Trachea is central.  LUNGS: Shows diminished air entry prolonged 

expiration.  T.  There a few "Velcro" [E] rales of pulmonary fibrosis in the 

right base.. There are no rales of CHF..  HEART: S1-S2 is heard.  S1 now is of 

normal intensity.  There is no S3 gallop.  There is no S4 gallop.  There is 

systolic murmur of tricuspid regurgitation and mild mitral regurgitation murmur 

present.  There is no S3 gallop.  There is no S4 gallop.  There is no rub.  

ABDOMEN: Is obese.  Nontender.  There is no paraspinal megaly.  Bowel sounds are

 well heard.  EXTREMITIES: Femorals are deep.  Femorals are diminished there is 

no femoral bruits.  Leg pulses are diminished.  There is 1+ bilateral edema.  

There is no DVT or cellulitis.  There is no cyanosis or clubbing.  There is no 

calf tenderness.  CNS: The patient is conscious awake alert oriented x3 with no 

focal deficit.  PSYCHIATRIC:.  The patient judgment insight are intact her 

affect is normal.





                              Labs- All tests 24 hr











  01/24/20 01/24/20 01/24/20





  02:59 16:12 20:58


 


Sodium  139.1  


 


Potassium  3.8  


 


Chloride  102  


 


Carbon Dioxide  37 H  


 


Anion Gap  0 L  


 


BUN  25 H  


 


Creatinine  1.01  


 


Est GFR ( Amer)  > 60  


 


Est GFR (MDRD) Non-Af  53 L  


 


Glucose  134 H  


 


POC Glucose   239 H  164 H


 


Calcium  7.9 L  


 


Magnesium  1.7  


 


Total Bilirubin  0.2  


 


Direct Bilirubin  0.0  


 


Neonat Total Bilirubin  Not Reportable  


 


Neonat Direct Bilirubin  Not Reportable  


 


Neonat Indirect Bili  Not Reportable  


 


AST  13 L  


 


ALT  17  


 


Alkaline Phosphatase  64  


 


Total Protein  4.5 L  


 


Albumin  2.4 L  








                                        





Chest X-Ray  01/19/20 16:04


IMPRESSION:  Cardiomegaly and mild central vascular congestion.  Elevation of 

the right hemidiaphragm.


 








Head CT  01/19/20 21:36


IMPRESSION: 


 


No acute intracranial abnormalities.


 


 


 


 








Chest X-Ray  01/20/20 01:40


IMPRESSION:


 


Satisfactory placement of central venous catheter


 


 


copyright 2011 [employer]- All Rights Reserved


 








Chest X-Ray  01/20/20 06:00


IMPRESSION:  Unchanged radiographic appearance of the chest.


 














IMPRESSION/RECOMMENDATION:





 1.  Atrial flutter.  S/p cardioversion to sinus bradycardia.  The patient's 

heart rate still low.  Will wait for the heart rate to come up prior to starting

 the patient on a beta-blocker or sotalol.


2.  Acute on chronic hypoxic respiratory failure: Seems to be improved.  

Continue current treatment.


3.  ACute on chronic kidney disease: GFR is improving.  Avoid nephrotoxic drugs.


4.  History of asthma/COPD: Continue current treatment.


5.  History of pulmonary fibrosis.


6.  Hypertension: Blood pressure now is much improved.  The patient was 

hypotensive on admission on pressors.  This has been weaned off.  Patient blood 

pressure stable without any pressors.


7.  Diabetes mellitus: Continue antidiabetic medication and Accu-Cheks as per 

protocol.


8.  Hypothyroidism: Thyroid level seems to be optimal.


9.  Cardiomyopathy with moderately reduced LV ejection fraction.  Continue 

esmolol later transition to Toprol-XL.  Continue Entresto.  Note with the 

patient being in sinus rhythm patient repeat echocardiogram shows that the 

cardiomyopathy is resolved.  LV ejection fraction is 60% although the ecstatic 

very good echo study.


10.  Moderate to severe tricuspid regurgitation with moderate pulmonary 

hypertension


11.  Successful elective DC synchronized cardioversion of the patient, atrial 

flutter to sinus rhythm.


12.  Moderate pulmonary hypertension: Since the blood pressure is up will try to

 increase the patient's Entresto.








Medications reviewed.  Medical management and management plan discussed with the

 intensivist.  Medical decision making is of high complexity.  40 minutes spent 

with patient more than 50% time spent in direct patient care.  Will follow.

## 2020-01-24 NOTE — EKG REPORT
SEVERITY:- ABNORMAL ECG -

SINUS RHYTHM

INCOMPLETE RBBB AND LAFB

PROBABLE ANTEROSEPTAL INFARCT, AGE INDETERM

:

Confirmed by: Chaitanya Mendez MD 24-Jan-2020 07:04:53

## 2020-01-24 NOTE — PDOC CRITICAL CARE PROG REPORT
General


Date:: 01/24/20


ICU Day:: 4


Hospital Day:: 4


Resuscitation Status: Full Code


Events in the past 12 to 24 Hours:: 





Carioverted successfully. Off esmolol and amiodarone IV


Review of systems relevant to events:: 





CV


Reason for ICU Addmission:: Hypotension, AMS, CAROL, need for levophed. Now off 

and on IV amiodarone and esmolol.





- Medications:


Medications reviewed and adjusted accordingly: Yes


Vasopressors:: 





None


Sedation:: 





None





Physical Exam


Vital Signs: 


                                        











Temp Pulse Resp BP Pulse Ox


 


 98.1 F   56 L  22 H  116/56 L  99 


 


 01/24/20 10:13  01/24/20 10:00  01/24/20 10:13  01/24/20 10:13  01/24/20 10:13








                                 Intake & Output











 01/23/20 01/24/20 01/25/20





 06:59 06:59 06:59


 


Intake Total 2245 348 200


 


Output Total 975 2110 50


 


Balance 1270 -1762 150


 


Weight 105.8 kg 107.3 kg 








                                  Weight/Height





Weight                           107.3 kg


Height                           5 ft








General appearance: PRESENT: no acute distress, morbidly obese


Head exam: PRESENT: atraumatic, normocephalic


Eye exam: PRESENT: conjunctiva pink, EOMI, PERRLA.  ABSENT: scleral icterus


Ear exam: PRESENT: normal external ear exam


Mouth exam: PRESENT: moist, tongue midline


Respiratory exam: PRESENT: clear to auscultation renay, decreased breath sounds.  

ABSENT: rales, rhonchi, wheezes


Cardiovascular exam: PRESENT: bradycardia


Vascular exam: PRESENT: normal capillary refill


GI/Abdominal exam: PRESENT: normal bowel sounds, soft.  ABSENT: distended, 

guarding, mass, organolmegaly, rebound, tenderness


Rectal exam: PRESENT: deferred


Extremities exam: PRESENT: pedal edema


Neurological exam: PRESENT: alert, awake, oriented to person, oriented to place,

oriented to time, oriented to situation, CN II-XII grossly intact.  ABSENT: 

motor sensory deficit


Psychiatric exam: PRESENT: appropriate affect, normal mood.  ABSENT: homicidal 

ideation, suicidal ideation


Skin exam: PRESENT: dry, normal color





Laboratory/Radiographs


Laboratory Results: 


                                        





                                 01/20/20 06:45 





                                 01/24/20 02:59 





                                        











  01/23/20 01/24/20





  16:30 02:59


 


Sodium  137.6  139.1


 


Potassium  3.7  3.8


 


Chloride  99  102


 


Carbon Dioxide  38 H  37 H


 


Anion Gap  1 L  0 L


 


BUN  23 H  25 H


 


Creatinine  1.06  1.01


 


Est GFR ( Amer)  > 60  > 60


 


Glucose  137 H  134 H


 


Calcium  8.0 L  7.9 L


 


Magnesium  1.7  1.7


 


Total Bilirubin   0.2


 


AST   13 L


 


Alkaline Phosphatase   64


 


Total Protein   4.5 L


 


Albumin   2.4 L








                                        











  01/19/20 01/20/20 01/20/20





  16:25 02:40 08:40


 


Troponin I  < 0.012  < 0.012  < 0.012














  01/20/20





  15:36


 


Troponin I  < 0.012











Impressions: 


                                        





Head CT  01/19/20 21:36


IMPRESSION: 


 


No acute intracranial abnormalities.


 


 


 


 








Chest X-Ray  01/20/20 06:00


IMPRESSION:  Unchanged radiographic appearance of the chest.


 











All labs, radiographs, diagnostic studies and EKGs were personally reviewed: Yes


In addition, reports of radiographic and diagnostic studies were read: Yes





Assessment and Plan





- Diagnosis


(1) Atrial fibrillation


Qualifiers: 


   Atrial fibrillation type: unspecified   Qualified Code(s): I48.91 - 

Unspecified atrial fibrillation   


Is this a current diagnosis for this admission?: Yes   


Plan: 


Cardioverted to SR. Off esmolol and amiodarone. Needs at least one more day to 

observe. Any cardiac meds per Dr. Ruiz. OK to transfer from ICU.








(2) Chronic obstructive pulmonary disease (COPD)


Qualifiers: 


   COPD type: unspecified COPD   Qualified Code(s): J44.9 - Chronic obstructive 

pulmonary disease, unspecified   


Is this a current diagnosis for this admission?: Yes   


Plan: 


Stable.








(3) Hyperkalemia


Is this a current diagnosis for this admission?: Yes   


Plan: 


Resolved








(4) Morbid obesity with BMI of 40.0-44.9, adult


Is this a current diagnosis for this admission?: Yes   





(5) Acute on chronic diastolic congestive heart failure


Is this a current diagnosis for this admission?: Yes   





(6) Diabetes mellitus type 2 in obese


Is this a current diagnosis for this admission?: Yes   





Critical Time


Critical Time (minutes): 25


Level of Care: IMCU


Anticipated discharge: SNF


Within: within 48 hours


-: 


1.  The care of a critical patient is a dynamic process.  This note is a 

representative synopsis but static in nature.  The timeframe for treatments 

given in order is not necessarily the actual time these treatments may have been

done.





2.  This patient requires critical care secondary to ongoing requirements for 

therapy not offered or safe outside the critical care environment.  Transfer to 

a lower level of care will result in altered life or limb morbidity and 

mortality.





3.  Multidisciplinary rounds completed.





4.  ABCDE bundle addressed.

## 2020-01-25 RX ADMIN — PREGABALIN SCH MG: 100 CAPSULE ORAL at 09:14

## 2020-01-25 RX ADMIN — INSULIN HUMAN SCH: 100 INJECTION, SOLUTION PARENTERAL at 09:13

## 2020-01-25 RX ADMIN — PREGABALIN SCH MG: 100 CAPSULE ORAL at 17:39

## 2020-01-25 RX ADMIN — Medication SCH ML: at 12:59

## 2020-01-25 RX ADMIN — INSULIN HUMAN SCH: 100 INJECTION, SOLUTION PARENTERAL at 22:17

## 2020-01-25 RX ADMIN — INSULIN HUMAN SCH UNIT: 100 INJECTION, SOLUTION PARENTERAL at 17:39

## 2020-01-25 RX ADMIN — DOCUSATE SODIUM SCH MG: 100 CAPSULE, LIQUID FILLED ORAL at 09:14

## 2020-01-25 RX ADMIN — HYDROCORTISONE SODIUM SUCCINATE SCH MG: 100 INJECTION, POWDER, FOR SOLUTION INTRAMUSCULAR; INTRAVENOUS at 07:03

## 2020-01-25 RX ADMIN — FUROSEMIDE SCH MG: 20 TABLET ORAL at 09:14

## 2020-01-25 RX ADMIN — DOCUSATE SODIUM SCH MG: 100 CAPSULE, LIQUID FILLED ORAL at 17:39

## 2020-01-25 RX ADMIN — FLUTICASONE FUROATE, UMECLIDINIUM BROMIDE AND VILANTEROL TRIFENATATE SCH INH: 100; 62.5; 25 POWDER RESPIRATORY (INHALATION) at 09:15

## 2020-01-25 RX ADMIN — Medication SCH ML: at 22:15

## 2020-01-25 RX ADMIN — INSULIN HUMAN SCH UNIT: 100 INJECTION, SOLUTION PARENTERAL at 12:57

## 2020-01-25 RX ADMIN — SACUBITRIL AND VALSARTAN SCH TAB: 49; 51 TABLET, FILM COATED ORAL at 22:12

## 2020-01-25 RX ADMIN — LEVOTHYROXINE SODIUM SCH MG: 25 TABLET ORAL at 07:03

## 2020-01-25 RX ADMIN — SACUBITRIL AND VALSARTAN SCH TAB: 49; 51 TABLET, FILM COATED ORAL at 09:14

## 2020-01-25 RX ADMIN — PREGABALIN SCH MG: 100 CAPSULE ORAL at 12:59

## 2020-01-25 RX ADMIN — GLIMEPIRIDE SCH MG: 1 TABLET ORAL at 09:14

## 2020-01-25 RX ADMIN — HYDROCORTISONE SODIUM SUCCINATE SCH MG: 100 INJECTION, POWDER, FOR SOLUTION INTRAMUSCULAR; INTRAVENOUS at 12:59

## 2020-01-25 RX ADMIN — HYDROCODONE BITARTRATE AND ACETAMINOPHEN PRN TAB: 5; 325 TABLET ORAL at 22:12

## 2020-01-25 RX ADMIN — RIVAROXABAN SCH MG: 10 TABLET, FILM COATED ORAL at 09:14

## 2020-01-25 RX ADMIN — HYDROCODONE BITARTRATE AND ACETAMINOPHEN PRN TAB: 5; 325 TABLET ORAL at 03:38

## 2020-01-25 RX ADMIN — Medication SCH ML: at 07:04

## 2020-01-25 RX ADMIN — HYDROCORTISONE SODIUM SUCCINATE SCH MG: 100 INJECTION, POWDER, FOR SOLUTION INTRAMUSCULAR; INTRAVENOUS at 22:12

## 2020-01-25 NOTE — XCELERA REPORT
[address] [address]

                               Tel: [phone]

                               Fax: [phone]



                      Transthoracic Echocardiogram Report

_______________________________________________________________________________



Name: SHAYAN BUSH

MRN: [id number]                           Age: 79 yrs

Gender: Female                            : 1940

Patient Status: Inpatient                 Patient Location: ICU[facility]^A

Account #: [id number]

Study Date: 2020 02:59 PM

Accession #: [id number]

_______________________________________________________________________________





_______________________________________________________________________________

Procedure: A two-dimensional transthoracic echocardiogram with color flow and

Doppler was performed in limited views only. The study was technically

difficult with many images being suboptimal in quality.

Reason For Study: Limited study for follow up of cardiomyopathy and Pulmonary

Hypertension



History: Limited study for follow up of cardiomyopathy and Pulmonary

Hypertension.

Ordering Physician: NUVIA FIGUEROA

Performed By: Betty Ortiz

_______________________________________________________________________________





Interpretation Summary

Limited study shows normal LV size, with mild LVH.No regional wall motion

abnormality.LVEF is normal at 60%.

There is no tricuspid stenosis.

There is a mild amount of tricuspid regurgitation

There is moderate pulmonary hypertension by echo

RVSP is 51 mm of Hg , with RA mean of 10.



MMode/2D Measurements & Calculations

RVDd: 2.1 cm  LVIDd: 4.4 cm   FS: 31.5 %             Ao root diam: 2.9 cm

IVSd: 1.5 cm  LVIDs: 3.0 cm   EDV(Teich): 85.9 ml    Ao root area: 6.6 cm2

              LVPWd: 1.0 cm   ESV(Teich): 34.6 ml

                              EF(Teich): 59.7 %



Doppler Measurements & Calculations

PA V2 max: 98.7 cm/sec   PI max iraida: 132.4 cm/sec  TR max iraida: 317.9 cm/sec

PA max PG: 3.9 mmHg      PI max P.0 mmHg       TR max P.7 mmHg





Left Ventricle

Limited study shows normal LV size, with mild LVH.No regional wall motion

abnormality.LVEF is normal at 60%.



Tricuspid Valve

There is no tricuspid stenosis. There is a mild amount of tricuspid

regurgitation. There is moderate pulmonary hypertension by echo. RVSP is 51 mm

of Hg , with RA mean of 10.



_______________________________________________________________________________



_______________________________________________________________________________

Electronically signed by:      Nuvia Figueroa      on 2020 02:30 PM



CC: NUVIA FIGUEROA, Nuvia

## 2020-01-25 NOTE — PROGRESS NOTE
Provider Note


Provider Note: 





CARDIOLOGY PROGRESS NOTE by Dr. Nuvia Abdul on 1/25/2020.


OBJECTIVE: The patient denies any chest pain or discomfort.  There is no 

shortness of breath.  There is no PND orthopnea.  There is no recurrence of 

atrial fibrillation or ventricular arrhythmias.  There is no high-grade AV 

block.  Unfortunately the patient still in sinus bradycardia but with no 

symptoms and with a stable blood pressure.  In view of the bradycardia unable to

start the patient on a beta-blocker or sotalol.  Note that the patient's repeat 

echocardiogram showed that the patient's LV ejection fraction is now normal.  

Right ventricle systolic pressure still moderately elevated at 51 mmHg which is 

less than prior before.  Hence we will continue the patient on Entresto.  The 

patient has been downgraded to [facility] floor.





PHYSICAL EXAMINATION: The patient is morbidly obese.  In no acute distress.


                                                                Selected Entries











  01/25/20





  12:00


 


Temperature 98.8 F


 


Temperature Core





Source 


 


Pulse Rate 55 L


 


Heart Rate ( 59





Monitors) 


 


Respiratory 21 H





Rate 


 


Blood Pressure 145/76 H





[Left Upper Arm 





] 


 


Blood Pressure 99





Mean [Left 





Upper Arm] 


 


Blood Pressure Supine





Position [Left 





Upper Arm] 


 


O2 Sat by Pulse 98





Oximetry 


 


Oxygen Delivery Nasal Cannula





Method ( 





includes room 





air) 


 


Oxygen Flow 3





Rate 





HEAD: Is atraumatic.  Normocephalic.  EYES: Pupils are equal round regular 

reactive to light and accommodation.  Extraocular movements are normal there is 

no conjunctival pallor.  There is no scleral icterus.  EARS: Tympanic membranes 

are intact.  External auditory canals are clear.  NOSE: There is no deviated 

nasal septum.  There is no inflammation of the nasal mucous membrane.  MOUTH: T

here is no ulcers in the mouth.  Mucous membranes of the mouth are moist.  

THROAT: There is no redness of the oropharynx.  There is no exudates.  SKIN: 

There is no skin rashes.  There is no petechia or ecchymosis.  NECK: Is supple. 

There is mild JVD present.  Carotids are equal there is no bruit.  There is no 

lymphadenopathy.  There is no goiter.  There is no accessory muscle respiration 

use.  Trachea is central.  LUNGS: Shows diminished air entry prolonged 

expiration.  T.  There a few "Velcro" [E] rales of pulmonary fibrosis in the 

right base.. There are no rales of CHF..  HEART: S1-S2 is heard.  S1 now is of 

normal intensity.  There is no S3 gallop.  There is no S4 gallop.  There is 

systolic murmur of tricuspid regurgitation and mild mitral regurgitation murmur 

present.  There is no S3 gallop.  There is no S4 gallop.  There is no rub.  

ABDOMEN: Is obese.  Nontender.  There is no paraspinal megaly.  Bowel sounds are

 well heard.  EXTREMITIES: Femorals are deep.  Femorals are diminished there is 

no femoral bruits.  Leg pulses are diminished.  There is 1+ bilateral edema.  

There is no DVT or cellulitis.  There is no cyanosis or clubbing.  There is no 

calf tenderness.  CNS: The patient is conscious awake alert oriented x3 with no 

focal deficit.  PSYCHIATRIC:.  The patient judgment insight are intact her 

affect is normal.


                              Labs- All tests 24 hr











  01/24/20 01/25/20 01/25/20





  20:58 11:33 17:22


 


POC Glucose  164 H  186 H  307 H








                                        





Chest X-Ray  01/19/20 16:04


IMPRESSION:  Cardiomegaly and mild central vascular congestion.  Elevation of 

the right hemidiaphragm.


 








Head CT  01/19/20 21:36


IMPRESSION: 


 


No acute intracranial abnormalities.


 


 


 


 








Chest X-Ray  01/20/20 01:40


IMPRESSION:


 


Satisfactory placement of central venous catheter


 


 


copyright 2011 [employer]- All Rights Reserved


 








Chest X-Ray  01/20/20 06:00


IMPRESSION:  Unchanged radiographic appearance of the chest.


 


IMPRESSION/RECOMMENDATION:





 1.  Atrial flutter.  S/p cardioversion to sinus bradycardia.  The patient's 

heart rate still low.  Will wait for the heart rate to come up prior to starting

 the patient on a beta-blocker or sotalol.


2.  Acute on chronic hypoxic respiratory failure: Seems to be improved.  

Continue current treatment.


3.  ACute on chronic kidney disease: GFR is improving.  Avoid nephrotoxic drugs.


4.  History of asthma/COPD: Continue current treatment.


5.  History of pulmonary fibrosis.


6.  Hypertension: Blood pressure now is much improved.  The patient was 

hypotensive on admission on pressors.  This has been weaned off.  Patient blood 

pressure stable without any pressors.


7.  Diabetes mellitus: Continue antidiabetic medication and Accu-Cheks as per 

protocol.


8.  Hypothyroidism: Thyroid level seems to be optimal.


9.  Cardiomyopathy with moderately reduced LV ejection fraction.  Continue 

esmolol later transition to Toprol-XL.  Continue Entresto.  Note with the 

patient being in sinus rhythm patient repeat echocardiogram shows that the 

cardiomyopathy is resolved.  LV ejection fraction is 60% although the echo is 

not a very good echo study.


10.  Moderate to severe tricuspid regurgitation with moderate pulmonary 

hypertension


11.  Successful elective DC synchronized cardioversion of the patient, atrial fl

utter to sinus rhythm.


12.  Moderate pulmonary hypertension: Since the blood pressure is up will try to

 increase the patient's Entresto.





Medications reviewed.  Medical regimen and management plan discussed with 

attending provider on the case.  Medical decision making is of moderate 

complexity.  40 minutes spent with patient more than 50% of time spent in direct

 patient care.  Will follow.

## 2020-01-25 NOTE — PDOC CRITICAL CARE PROG REPORT
General


Date:: 01/18/20


Hospital Day:: 7


Resuscitation Status: Full Code


Events in the past 12 to 24 Hours:: 





Cardioverted successfully with no recurrence in 24 hours


Review of systems relevant to events:: 





CV


Reason for ICU Addmission:: She has been downgraded since yesterday.





- Medications:


Medications reviewed and adjusted accordingly: Yes


Vasopressors:: 





None


Sedation:: 





None





Physical Exam


Vital Signs: 


                                        











Temp Pulse Resp BP Pulse Ox


 


 98.8 F   55 L  21 H  145/76 H  98 


 


 01/25/20 12:00  01/25/20 12:00  01/25/20 12:00  01/25/20 12:00  01/25/20 12:00








                                 Intake & Output











 01/24/20 01/25/20 01/26/20





 06:59 06:59 06:59


 


Intake Total 348 1080 240


 


Output Total 2110 1170 185


 


Balance -1762 -90 55


 


Weight 107.3 kg 106.2 kg 








                                  Weight/Height





Weight                           106.2 kg


Height                           5 ft








General appearance: PRESENT: no acute distress, morbidly obese, well-developed, 

well-nourished


Head exam: PRESENT: atraumatic, normocephalic


Eye exam: PRESENT: conjunctiva pink, EOMI, PERRLA.  ABSENT: scleral icterus


Ear exam: PRESENT: normal external ear exam


Mouth exam: PRESENT: moist, tongue midline


Respiratory exam: PRESENT: clear to auscultation renay.  ABSENT: rales, rhonchi, 

wheezes


Cardiovascular exam: PRESENT: bradycardia


Vascular exam: PRESENT: normal capillary refill


GI/Abdominal exam: PRESENT: normal bowel sounds, soft.  ABSENT: distended, 

guarding, mass, organolmegaly, rebound, tenderness


Rectal exam: PRESENT: deferred


Extremities exam: PRESENT: full ROM.  ABSENT: calf tenderness, clubbing, pedal 

edema


Neurological exam: PRESENT: alert, awake, oriented to person, oriented to place,

oriented to time, oriented to situation, CN II-XII grossly intact.  ABSENT: 

motor sensory deficit


Skin exam: PRESENT: dry, intact, warm.  ABSENT: cyanosis, rash





Laboratory/Radiographs


Laboratory Results: 


                                        





                                 01/20/20 06:45 





                                 01/24/20 02:59 





                                        





01/19/20 18:23   Blood   Blood Culture - Final


                            NO GROWTH IN 5 DAYS


01/19/20 16:25   Blood   Blood Culture - Final


                            NO GROWTH IN 5 DAYS





                                        











  01/19/20 01/20/20 01/20/20





  16:25 02:40 08:40


 


Troponin I  < 0.012  < 0.012  < 0.012














  01/20/20





  15:36


 


Troponin I  < 0.012











Impressions: 


                                        





Head CT  01/19/20 21:36


IMPRESSION: 


 


No acute intracranial abnormalities.


 


 


 


 








Chest X-Ray  01/20/20 06:00


IMPRESSION:  Unchanged radiographic appearance of the chest.


 











All labs, radiographs, diagnostic studies and EKGs were personally reviewed: Yes


In addition, reports of radiographic and diagnostic studies were read: Yes





Assessment and Plan





- Diagnosis


(1) Atrial fibrillation


Qualifiers: 


   Atrial fibrillation type: unspecified   Qualified Code(s): I48.91 - 

Unspecified atrial fibrillation   


Is this a current diagnosis for this admission?: Yes   


Plan: 


In NSR since cardioversion. However Dr. Ruiz would like to start sotalol for 

rate stabilization before discharge to SNF. HR is too bradycardic right now. 

Needs tele for now.








(2) Chronic obstructive pulmonary disease (COPD)


Qualifiers: 


   COPD type: unspecified COPD   Qualified Code(s): J44.9 - Chronic obstructive 

pulmonary disease, unspecified   


Is this a current diagnosis for this admission?: Yes   


Plan: 


Stable








(3) Hyperkalemia


Is this a current diagnosis for this admission?: Yes   


Plan: 


Resolved








(4) Morbid obesity with BMI of 40.0-44.9, adult


Is this a current diagnosis for this admission?: Yes   


Plan: 


Chronic








(5) Acute on chronic diastolic congestive heart failure


Is this a current diagnosis for this admission?: Yes   


Plan: 


Stable








(6) Diabetes mellitus type 2 in obese


Is this a current diagnosis for this admission?: Yes   


Plan: 


Controlled





Plan Summary: 





Start sotalol at Dr. Ruiz's direction. Then transfer to rehab.





Critical Time


Critical Time (minutes): 20


Level of Care: IMCU


Anticipated discharge: SNF


Within: within 72 hours


-: 


1.  The care of a critical patient is a dynamic process.  This note is a represe

ntative synopsis but static in nature.  The timeframe for treatments given in 

order is not necessarily the actual time these treatments may have been done.





2.  This patient requires critical care secondary to ongoing requirements for 

therapy not offered or safe outside the critical care environment.  Transfer to 

a lower level of care will result in altered life or limb morbidity and 

mortality.





3.  Multidisciplinary rounds completed.





4.  ABCDE bundle addressed.

## 2020-01-26 RX ADMIN — Medication SCH ML: at 05:40

## 2020-01-26 RX ADMIN — INSULIN HUMAN SCH UNIT: 100 INJECTION, SOLUTION PARENTERAL at 21:59

## 2020-01-26 RX ADMIN — SOTALOL HYDROCHLORIDE SCH MG: 80 TABLET ORAL at 21:48

## 2020-01-26 RX ADMIN — Medication SCH ML: at 14:41

## 2020-01-26 RX ADMIN — LEVOTHYROXINE SODIUM SCH MG: 25 TABLET ORAL at 05:39

## 2020-01-26 RX ADMIN — Medication SCH ML: at 21:52

## 2020-01-26 RX ADMIN — PREGABALIN SCH MG: 100 CAPSULE ORAL at 17:47

## 2020-01-26 RX ADMIN — PREGABALIN SCH MG: 100 CAPSULE ORAL at 14:40

## 2020-01-26 RX ADMIN — RIVAROXABAN SCH MG: 10 TABLET, FILM COATED ORAL at 10:11

## 2020-01-26 RX ADMIN — HYDROCORTISONE SODIUM SUCCINATE SCH MG: 100 INJECTION, POWDER, FOR SOLUTION INTRAMUSCULAR; INTRAVENOUS at 14:40

## 2020-01-26 RX ADMIN — FLUTICASONE FUROATE, UMECLIDINIUM BROMIDE AND VILANTEROL TRIFENATATE SCH INH: 100; 62.5; 25 POWDER RESPIRATORY (INHALATION) at 10:12

## 2020-01-26 RX ADMIN — DOCUSATE SODIUM SCH MG: 100 CAPSULE, LIQUID FILLED ORAL at 10:11

## 2020-01-26 RX ADMIN — INSULIN HUMAN SCH UNIT: 100 INJECTION, SOLUTION PARENTERAL at 10:11

## 2020-01-26 RX ADMIN — HYDROCORTISONE SODIUM SUCCINATE SCH MG: 100 INJECTION, POWDER, FOR SOLUTION INTRAMUSCULAR; INTRAVENOUS at 05:39

## 2020-01-26 RX ADMIN — PREGABALIN SCH MG: 100 CAPSULE ORAL at 10:11

## 2020-01-26 RX ADMIN — FAMOTIDINE SCH MG: 20 TABLET, FILM COATED ORAL at 21:46

## 2020-01-26 RX ADMIN — FUROSEMIDE SCH MG: 20 TABLET ORAL at 10:11

## 2020-01-26 RX ADMIN — SACUBITRIL AND VALSARTAN SCH TAB: 49; 51 TABLET, FILM COATED ORAL at 21:46

## 2020-01-26 RX ADMIN — GLIMEPIRIDE SCH MG: 1 TABLET ORAL at 10:12

## 2020-01-26 RX ADMIN — DOCUSATE SODIUM SCH MG: 100 CAPSULE, LIQUID FILLED ORAL at 17:47

## 2020-01-26 RX ADMIN — INSULIN HUMAN SCH: 100 INJECTION, SOLUTION PARENTERAL at 11:22

## 2020-01-26 RX ADMIN — INSULIN HUMAN SCH: 100 INJECTION, SOLUTION PARENTERAL at 17:45

## 2020-01-26 RX ADMIN — HYDROCORTISONE SODIUM SUCCINATE SCH MG: 100 INJECTION, POWDER, FOR SOLUTION INTRAMUSCULAR; INTRAVENOUS at 21:46

## 2020-01-26 RX ADMIN — SACUBITRIL AND VALSARTAN SCH TAB: 49; 51 TABLET, FILM COATED ORAL at 10:12

## 2020-01-26 NOTE — PROGRESS NOTE
Provider Note


Provider Note: 





CARDIOLOGY PROGRESS NOTE by Dr. Nuvia Ramos on 1/26/2020.








SUBJECTIVE: The patient this morning ventilatory atrial fibrillation with 

ventricular response around 1 10-1 20.  She is asymptomatic.  The patient was 

given 2.5 mg of verapamil intravenously and subsequently also placed on sotalol 

40 mg p.o. x1 dose.  The patient soon after converted back to sinus rhythm.  She

denies any chest pain or discomfort.  There is no shortness of breath.  There is

no severe orthopnea.  The patient in the right she is on Xarelto with no 

bleeding.  There is no TIA CVA symptoms.  There is no ventricle arrhythmia seen 

on the monitor.





PHYSICAL EXAMINATION: The patient is morbidly obese.  At present in no acute 

distress.


Vital signs when the patient was in atrial fibrillation.


                                                                Selected Entries











  01/26/20 01/26/20 01/26/20





  10:00 10:57 11:00


 


Temperature  98.5 F 


 


Temperature  Oral 





Source   


 


Pulse Rate  109 H 


 


Heart Rate ( 107  88





Monitors)   


 


Respiratory  18 





Rate   


 


Blood Pressure  102/63 


 


Blood Pressure  76 





Mean   


 


BP Location  Right Arm 


 


BP Position  Supine 


 


O2 Sat by Pulse  96 





Oximetry   


 


Oxygen Delivery  Room Air 





Method   





Post conversion to sinus rhythm.  The patient's heart rate is 60 bpm with a 

blood pressure of 119/53..


. 








HEAD: Is atraumatic.  Normocephalic.  EYES: Pupils are equal round regular 

reactive to light and accommodation.  Extraocular movements are normal there is 

no conjunctival pallor.  There is no scleral icterus.  EARS: Tympanic membranes 

are intact.  External auditory canals are clear.  NOSE: There is no deviated 

nasal septum.  There is no inflammation of the nasal mucous membrane.  MOUTH: 

There is no ulcers in the mouth.  Mucous membranes of the mouth are moist.  

THROAT: There is no redness of the oropharynx.  There is no exudates.  SKIN: 

There is no skin rashes.  There is no petechia or ecchymosis.  NECK: Is supple. 

There is mild JVD present.  Carotids are equal there is no bruit.  There is no 

lymphadenopathy.  There is no goiter.  There is no accessory muscle respiration 

use.  Trachea is central.  LUNGS: Shows diminished air entry prolonged 

expiration.  T.  There a few "Velcro" [E] rales of pulmonary fibrosis in the 

right base.. There are no rales of CHF..  HEART: S1-S2 is heard.  S1 now is of 

normal intensity.  There is no S3 gallop.  There is no S4 gallop.  There is 

systolic murmur of tricuspid regurgitation and mild mitral regurgitation murmur 

present.  There is no S3 gallop.  There is no S4 gallop.  There is no rub.  

ABDOMEN: Is obese.  Nontender.  There is no paraspinal megaly.  Bowel sounds are

 well heard.  EXTREMITIES: Femorals are deep.  Femorals are diminished there is 

no femoral bruits.  Leg pulses are diminished.  There is 1+ bilateral edema.  

There is no DVT or cellulitis.  There is no cyanosis or clubbing.  There is no 

calf tenderness.  CNS: The patient is conscious awake alert oriented x3 with no 

focal deficit.  PSYCHIATRIC:.  The patient judgment insight are intact her 

affect is normal.








EKG: SINUS RHYTHM WITH APC.  RATE 60-75


[RLAFB] . RBBB AND LAFB


[LVHPRE] . PROBABLE LVH WITH SECONDARY REPOL ABNRM


                              Labs- All tests 24 hr











  01/25/20 01/25/20 01/26/20





  17:22 22:16 07:40


 


POC Glucose  307 H  146 H  167 H














  01/26/20





  11:10


 


POC Glucose  107











                                        





Chest X-Ray  01/19/20 16:04


IMPRESSION:  Cardiomegaly and mild central vascular congestion.  Elevation of t

he right hemidiaphragm.


 








Head CT  01/19/20 21:36


IMPRESSION: 


 


No acute intracranial abnormalities.


 


 


 


 








Chest X-Ray  01/20/20 01:40


IMPRESSION:


 


Satisfactory placement of central venous catheter


 


 


copyright 2011 [employer]- All Rights Reserved


 








Chest X-Ray  01/20/20 06:00


IMPRESSION:  Unchanged radiographic appearance of the chest.


 














IMPRESSION/RECOMMENDATION:





 1.  Atrial flutter.  Paroxysmal s/p cardioversion to sinus bradycardia.  The 

patient remained bradycardic for some time and then went into atrial 

fibrillation with ventricular response in the 110s to 120s.  Post 2.5 mg of IV 

verapamil and 40 mg of sotalol the patient converted to sinus bradycardia.  We 

will continue patient's on sotalol and will get serial EKGs and watch the 

patient for proarrhythmia and also watch the QTc interval.


2.  Acute on chronic hypoxic respiratory failure: Seems to be improved.  

Continue current treatment.


3.  ACute on chronic kidney disease: GFR is improving.  Avoid nephrotoxic drugs.


4.  History of asthma/COPD: Continue current treatment.


5.  History of pulmonary fibrosis.


6.  Hypertension: Blood pressure now is much improved.  The patient was 

hypotensive on admission on pressors.  This has been weaned off.  Patient blood 

pressure stable without any pressors.


7.  Diabetes mellitus: Continue antidiabetic medication and Accu-Cheks as per 

protocol.


8.  Hypothyroidism: Thyroid level seems to be optimal.


9.  Cardiomyopathy with moderately reduced LV ejection fraction.  Continue 

esmolol later transition to Toprol-XL.  Continue Entresto.  Note with the 

patient being in sinus rhythm patient repeat echocardiogram shows that the 

cardiomyopathy is resolved.  LV ejection fraction is 60% although the echo is 

not a very good echo study.


10.  Moderate to severe tricuspid regurgitation with moderate pulmonary 

hypertension


11.  Successful elective DC synchronized cardioversion of the patient, atrial 

flutter to sinus rhythm.


12.  Moderate pulmonary hypertension: Since the blood pressure is up will try to

increase the patient's Entresto.





Medications reviewed.  Medications added.  Medical decision making is of high 

complexity in view of the patient's need for medications to be added and 

adjusted.  Medical regimen and management plan discussed with attending provider

on the case.  40 minutes spent with patient more than 50% time spent in direct 

patient care.  Will follow.

## 2020-01-26 NOTE — EKG REPORT
SEVERITY:- ABNORMAL ECG -

atrial fibrillation.

RBBB AND LAFB

PROBABLE LVH WITH SECONDARY REPOL ABNRM

:

Confirmed by: Chaitanya Mendez MD 26-Jan-2020 15:59:31

## 2020-01-26 NOTE — EKG REPORT
SEVERITY:- ABNORMAL ECG -

SINUS ARRHYTHMIA, RATE 60-75

RBBB AND LAFB

PROBABLE LVH WITH SECONDARY REPOL ABNRM

:

Confirmed by: Chaitanya Mendez MD 26-Jan-2020 15:58:27

## 2020-01-26 NOTE — PDOC PROGRESS REPORT
Subjective


Progress Note for:: 01/26/20


Subjective:: 


The patient went back into atrial fibrillation with rapid ventricular response 

last night.  She is just starting on the sotalol.  She feels fluttering but is 

not having any significant chest pain.  She also is complaining of stomach gas.


Reason For Visit: 


ON AMIODARONE INFUSION








Physical Exam


Vital Signs: 


                                        











Temp Pulse Resp BP Pulse Ox


 


 98.6 F   67   18   104/41 L  97 


 


 01/26/20 07:20  01/26/20 07:20  01/26/20 07:20  01/26/20 07:20  01/26/20 07:20








                                 Intake & Output











 01/25/20 01/26/20 01/27/20





 06:59 06:59 06:59


 


Intake Total 1080 739 


 


Output Total 1170 735 


 


Balance -90 4 


 


Weight 106.2 kg 105.5 kg 











General appearance: PRESENT: cooperative, mild distress, morbidly obese, well-

developed


Head exam: PRESENT: atraumatic, normocephalic


Eye exam: PRESENT: conjunctiva pink.  ABSENT: scleral icterus


Ear exam: PRESENT: normal external ear exam.  ABSENT: bleeding, drainage


Mouth exam: PRESENT: moist, tongue midline


Respiratory exam: PRESENT: clear to auscultation renay, symmetrical, unlabored.  

ABSENT: rales, rhonchi, tachypnea, wheezes


Cardiovascular exam: PRESENT: irregular rhythm, tachycardia


GI/Abdominal exam: PRESENT: normal bowel sounds, soft.  ABSENT: distended, 

guarding, tenderness


Rectal exam: PRESENT: deferred


Gentrourinary exam: ABSENT: indwelling catheter


Extremities exam: PRESENT: pedal edema


Musculoskeletal exam: PRESENT: ambulatory.  ABSENT: deformity


Neurological exam: PRESENT: alert, awake, oriented to person, oriented to place,

oriented to time, oriented to situation, CN II-XII grossly intact


Psychiatric exam: PRESENT: appropriate affect.  ABSENT: agitated, anxious


Focused psych exam: ABSENT: delusional, restlessness


Skin exam: PRESENT: dry, warm.  ABSENT: rash





Results


Laboratory Results: 


                                        





                                 01/20/20 06:45 





                                 01/24/20 02:59 





                                        











  01/19/20 01/20/20 01/20/20





  16:25 02:40 08:40


 


Troponin I  < 0.012  < 0.012  < 0.012














  01/20/20





  15:36


 


Troponin I  < 0.012











Impressions: 


                                        





Head CT  01/19/20 21:36


IMPRESSION: 


 


No acute intracranial abnormalities.


 


 


 


 








Chest X-Ray  01/20/20 06:00


IMPRESSION:  Unchanged radiographic appearance of the chest.


 














Assessment and Plan





- Diagnosis


(1) Atrial fibrillation with rapid ventricular response


Is this a current diagnosis for this admission?: Yes   


Plan: 


The patient transition from the ICU yesterday.  She was converted but now has 

been bradycardic until earlier today.  Her heart rate increased and she was back

in fibrillation.  She was started on sotalol and will need to be monitored for 

48 hours.  Dr. Abdul will continue to follow.








(2) Chronic obstructive pulmonary disease (COPD)


Qualifiers: 


   COPD type: unspecified COPD   Qualified Code(s): J44.9 - Chronic obstructive 

pulmonary disease, unspecified   


Is this a current diagnosis for this admission?: Yes   


Plan: 


Continue Trelegy inhaler








(3) Hyperkalemia


Is this a current diagnosis for this admission?: Yes   


Plan: 


Potassium currently normal.  Patient is on furosemide.  Continue to monitor 

potassium and supplement if needed








(4) Acute on chronic diastolic congestive heart failure


Is this a current diagnosis for this admission?: Yes   


Plan: 


Continue Entresto and furosemide.  Rate control medications as above.








(5) Morbid obesity with BMI of 40.0-44.9, adult


Is this a current diagnosis for this admission?: Yes   


Plan: 


Encourage weight loss as this contributes to her diabetes and cardiovascular 

risk.








(6) Diabetes mellitus type 2 in obese


Is this a current diagnosis for this admission?: Yes   


Plan: 


The patient's Accu-Cheks are variable.  This could be related to the 

hydrocortisone.  I will change the dosing to p.o. and reduce to 20 mg every 8 

hours and continue to taper.








(7) Eructation


Is this a current diagnosis for this admission?: Yes   


Plan: 


Patient is complaining of increased stomach gas and burping.  She is already on 

acid reducing therapy and I will add as needed simethicone.








- Plan Summary


Summary: 


1/26/2020


Dr. Abdul has started the patient on sotalol.  Verapamil is also available 

if needed.


Congestive heart failure appears to be stable.


Diabetes is still not well controlled.  This could be due to stress doses of 

intravenous hydrocortisone.  I will change to oral administration and decrease 

the dose.  She will be on 20 mg every 8 hours and we should continue to taper.





- Time


Time Spent with patient: 15-24 minutes


Medications reviewed and adjusted accordingly: Yes


Anticipated discharge: Home

## 2020-01-27 LAB
ANION GAP SERPL CALC-SCNC: 1 MMOL/L (ref 5–19)
BUN SERPL-MCNC: 22 MG/DL (ref 7–20)
CALCIUM: 7.7 MG/DL (ref 8.4–10.2)
CHLORIDE SERPL-SCNC: 98 MMOL/L (ref 98–107)
CO2 SERPL-SCNC: 41 MMOL/L (ref 22–30)
ERYTHROCYTE [DISTWIDTH] IN BLOOD BY AUTOMATED COUNT: 15.3 % (ref 11.5–14)
GLUCOSE SERPL-MCNC: 63 MG/DL (ref 75–110)
HCT VFR BLD CALC: 25.6 % (ref 36–47)
HGB BLD-MCNC: 8.8 G/DL (ref 12–15.5)
MCH RBC QN AUTO: 32.3 PG (ref 27–33.4)
MCHC RBC AUTO-ENTMCNC: 34.3 G/DL (ref 32–36)
MCV RBC AUTO: 94 FL (ref 80–97)
PLATELET # BLD: 198 10^3/UL (ref 150–450)
POTASSIUM SERPL-SCNC: 3.2 MMOL/L (ref 3.6–5)
RBC # BLD AUTO: 2.71 10^6/UL (ref 3.72–5.28)
WBC # BLD AUTO: 4.2 10^3/UL (ref 4–10.5)

## 2020-01-27 RX ADMIN — LEVOTHYROXINE SODIUM SCH MG: 25 TABLET ORAL at 05:42

## 2020-01-27 RX ADMIN — INSULIN HUMAN SCH: 100 INJECTION, SOLUTION PARENTERAL at 11:51

## 2020-01-27 RX ADMIN — DOCUSATE SODIUM SCH MG: 100 CAPSULE, LIQUID FILLED ORAL at 09:16

## 2020-01-27 RX ADMIN — Medication SCH ML: at 05:42

## 2020-01-27 RX ADMIN — Medication SCH: at 13:40

## 2020-01-27 RX ADMIN — GLIMEPIRIDE SCH MG: 1 TABLET ORAL at 09:16

## 2020-01-27 RX ADMIN — HYDROCORTISONE SCH MG: 10 TABLET ORAL at 22:24

## 2020-01-27 RX ADMIN — POTASSIUM CHLORIDE SCH MEQ: 750 TABLET, FILM COATED, EXTENDED RELEASE ORAL at 09:16

## 2020-01-27 RX ADMIN — INSULIN HUMAN SCH: 100 INJECTION, SOLUTION PARENTERAL at 08:10

## 2020-01-27 RX ADMIN — HYDROCORTISONE SODIUM SUCCINATE SCH MG: 100 INJECTION, POWDER, FOR SOLUTION INTRAMUSCULAR; INTRAVENOUS at 05:42

## 2020-01-27 RX ADMIN — Medication SCH ML: at 22:25

## 2020-01-27 RX ADMIN — SACUBITRIL AND VALSARTAN SCH TAB: 49; 51 TABLET, FILM COATED ORAL at 22:24

## 2020-01-27 RX ADMIN — FAMOTIDINE SCH MG: 20 TABLET, FILM COATED ORAL at 22:24

## 2020-01-27 RX ADMIN — FLUTICASONE FUROATE, UMECLIDINIUM BROMIDE AND VILANTEROL TRIFENATATE SCH INH: 100; 62.5; 25 POWDER RESPIRATORY (INHALATION) at 09:18

## 2020-01-27 RX ADMIN — HYDROCODONE BITARTRATE AND ACETAMINOPHEN PRN TAB: 5; 325 TABLET ORAL at 02:37

## 2020-01-27 RX ADMIN — SACUBITRIL AND VALSARTAN SCH TAB: 49; 51 TABLET, FILM COATED ORAL at 09:17

## 2020-01-27 RX ADMIN — SOTALOL HYDROCHLORIDE SCH MG: 80 TABLET ORAL at 09:17

## 2020-01-27 RX ADMIN — HYDROCORTISONE SCH MG: 10 TABLET ORAL at 13:23

## 2020-01-27 RX ADMIN — DOCUSATE SODIUM SCH MG: 100 CAPSULE, LIQUID FILLED ORAL at 17:07

## 2020-01-27 RX ADMIN — FAMOTIDINE SCH MG: 20 TABLET, FILM COATED ORAL at 09:15

## 2020-01-27 RX ADMIN — SOTALOL HYDROCHLORIDE SCH: 80 TABLET ORAL at 22:13

## 2020-01-27 RX ADMIN — FUROSEMIDE SCH MG: 20 TABLET ORAL at 08:09

## 2020-01-27 RX ADMIN — POTASSIUM CHLORIDE SCH MEQ: 750 TABLET, FILM COATED, EXTENDED RELEASE ORAL at 15:42

## 2020-01-27 RX ADMIN — HYDROCODONE BITARTRATE AND ACETAMINOPHEN PRN TAB: 5; 325 TABLET ORAL at 15:40

## 2020-01-27 RX ADMIN — PREGABALIN SCH MG: 100 CAPSULE ORAL at 17:07

## 2020-01-27 RX ADMIN — INSULIN HUMAN SCH UNIT: 100 INJECTION, SOLUTION PARENTERAL at 17:07

## 2020-01-27 RX ADMIN — RIVAROXABAN SCH MG: 10 TABLET, FILM COATED ORAL at 09:16

## 2020-01-27 RX ADMIN — PREGABALIN SCH MG: 100 CAPSULE ORAL at 09:16

## 2020-01-27 RX ADMIN — PREGABALIN SCH MG: 100 CAPSULE ORAL at 13:23

## 2020-01-27 RX ADMIN — INSULIN HUMAN SCH UNIT: 100 INJECTION, SOLUTION PARENTERAL at 22:24

## 2020-01-27 NOTE — EKG REPORT
SEVERITY:- ABNORMAL ECG -

SINUS BRADYCARDIA

RBBB AND LAFB

PROBABLE LEFT VENTRICULAR HYPERTROPHY

:

Confirmed by: Chaitanya Mendez MD 27-Jan-2020 06:29:58

## 2020-01-27 NOTE — PROGRESS NOTE
Provider Note


Provider Note: 





CARDIOLOGY PROGRESS NOTE by Dr. Nuvia Abdul on 1/27/2020.





OBJECTIVE: The patient is without any complaints and sitting up in a chair.  She

remains in sinus bradycardia.  She has not had any recurrence of atrial 

fibrillation.  He denies any chest pain or discomfort.  There is no shortness of

breath.  There is no PND orthopnea.  There is no ventricular arrhythmias seen.  

There is no pleural arrhythmia on sotalol.  There is no bleeding on Xarelto.  

There is no TIA CVA symptoms.





PHYSICAL EXAMINATION: The patient is morbidly obese.  In no acute distress.  She

is well-groomed.


                                                                Selected Entries











  01/26/20 01/26/20





  10:57 11:00


 


Temperature 98.5 F 


 


Temperature Oral 





Source  


 


Heart Rate (  88





Monitors)  


 


Respiratory 18 





Rate  


 


Blood Pressure 102/63 


 


Blood Pressure 76 





Mean  


 


BP Location Right Arm 


 


BP Position Supine 


 


O2 Sat by Pulse 96 





Oximetry  


 


Oxygen Delivery Room Air 





Method  





HEAD: Is atraumatic.  Normocephalic.  EYES: Pupils are equal round regular 

reactive to light and accommodation.  Extraocular movements are normal there is 

no conjunctival pallor.  There is no scleral icterus.  EARS: Tympanic membranes 

are intact.  External auditory canals are clear.  NOSE: There is no deviated 

nasal septum.  There is no inflammation of the nasal mucous membrane.  MOUTH: 

There is no ulcers in the mouth.  Mucous membranes of the mouth are moist.  

THROAT: There is no redness of the oropharynx.  There is no exudates.  SKIN: 

There is no skin rashes.  There is no petechia or ecchymosis.  NECK: Is supple. 

There is mild JVD present.  Carotids are equal there is no bruit.  There is no 

lymphadenopathy.  There is no goiter.  There is no accessory muscle respiration 

use.  Trachea is central.  LUNGS: Shows diminished air entry prolonged 

expiration.  T.  There a few "Velcro" [E] rales of pulmonary fibrosis in the 

right base.. There are no rales of CHF..  HEART: S1-S2 is heard.  S1 now is of 

normal intensity.  There is no S3 gallop.  There is no S4 gallop.  There is 

systolic murmur of tricuspid regurgitation and mild mitral regurgitation murmur 

present.  There is no S3 gallop.  There is no S4 gallop.  There is no rub.  

ABDOMEN: Is obese.  Nontender.  There is no paraspinal megaly.  Bowel sounds are

 well heard.  EXTREMITIES: Femorals are deep.  Femorals are diminished there is 

no femoral bruits.  Leg pulses are diminished.  There is 1+ bilateral edema.  

There is no DVT or cellulitis.  There is no cyanosis or clubbing.  There is no 

calf tenderness.  CNS: The patient is conscious awake alert oriented x3 with no 

focal deficit.  PSYCHIATRIC:.  The patient judgment insight are intact her 

affect is normal.


SINUS BRADYCARDIA


RBBB AND LAFB


PROBABLE LEFT VENTRICULAR HYPERTROPHY


Room: 319


Oper: CM


HR 49





QRSD 172





QTc 412


-- AXIS --


P 34


QRS -36


T -42


                              Labs- All tests 24 hr











  01/27/20 01/27/20 01/27/20





  05:49 05:49 07:51


 


WBC   4.2 


 


RBC   2.71 L 


 


Hgb   8.8 L 


 


Hct   25.6 L 


 


MCV   94 


 


MCH   32.3 


 


MCHC   34.3 


 


RDW   15.3 H 


 


Plt Count   198 


 


Sodium  139.5  


 


Potassium  3.2 L  


 


Chloride  98  


 


Carbon Dioxide  41 H*  


 


Anion Gap  1 L  


 


BUN  22 H  


 


Creatinine  0.80  


 


Est GFR ( Amer)  > 60  


 


Est GFR (MDRD) Non-Af  > 60  


 


Glucose  63 L  


 


POC Glucose    71


 


Calcium  7.7 L  


 


Magnesium  1.8  














  01/27/20 01/27/20 01/27/20





  11:33 16:15 21:11


 


WBC   


 


RBC   


 


Hgb   


 


Hct   


 


MCV   


 


MCH   


 


MCHC   


 


RDW   


 


Plt Count   


 


Sodium   


 


Potassium   


 


Chloride   


 


Carbon Dioxide   


 


Anion Gap   


 


BUN   


 


Creatinine   


 


Est GFR ( Amer)   


 


Est GFR (MDRD) Non-Af   


 


Glucose   


 


POC Glucose  149 H  272 H  226 H


 


Calcium   


 


Magnesium   








                                        





Chest X-Ray  01/19/20 16:04


IMPRESSION:  Cardiomegaly and mild central vascular congestion.  Elevation of 

the right hemidiaphragm.


 








Head CT  01/19/20 21:36


IMPRESSION: 


 


No acute intracranial abnormalities.


 


 


 


 








Chest X-Ray  01/20/20 01:40


IMPRESSION:


 


Satisfactory placement of central venous catheter


 


 


copyright 2011 [employer]- All Rights Reserved


 








Chest X-Ray  01/20/20 06:00


IMPRESSION:  Unchanged radiographic appearance of the chest.


 











IMPRESSION/RECOMMENDATION:





 1.  Atrial flutter.  Paroxysmal s/p cardioversion to sinus bradycardia.  The 

patient remained bradycardic for some time and then went into atrial fibrill

ation with ventricular response in the 110s to 120s.  Post 2.5 mg of IV 

verapamil and 40 mg of sotalol the patient converted to sinus bradycardia.  We 

will continue patient's on sotalol and will get serial EKGs and watch the 

patient for proarrhythmia and also watch the QTc interval.


2.  Acute on chronic hypoxic respiratory failure: Seems to be improved.  

Continue current treatment.


3.  ACute on chronic kidney disease: GFR is improving.  Avoid nephrotoxic drugs.


4.  History of asthma/COPD: Continue current treatment.


5.  History of pulmonary fibrosis.


6.  Hypertension: Blood pressure now is much improved.  The patient was 

hypotensive on admission on pressors.  This has been weaned off.  Patient blood 

pressure stable without any pressors.


7.  Diabetes mellitus: Continue antidiabetic medication and Accu-Cheks as per 

protocol.


8.  Hypothyroidism: Thyroid level seems to be optimal.


9.  Cardiomyopathy with moderately reduced LV ejection fraction.  Continue 

esmolol later transition to Toprol-XL.  Continue Entresto.  Note with the 

patient being in sinus rhythm patient repeat echocardiogram shows that the 

cardiomyopathy is resolved.  LV ejection fraction is 60% although the echo is 

not a very good echo study.


10.  Moderate to severe tricuspid regurgitation with moderate pulmonary 

hypertension


11.  Successful elective DC synchronized cardioversion of the patient, atrial 

flutter to sinus rhythm.


12.  Moderate pulmonary hypertension: Since the blood pressure is up will try to

 increase the patient's Entresto.





Occasions reviewed.  Medical regimen and management plan discussed with 

attending physician.  There is been no further arrhythmias on sotalol.  Hence 

hopefully if no complications the patient can be discharged tomorrow.  Medical 

decision making is of moderate complexity.  40 minutes spent as patient more 

than 50% time spent in direct patient care.  Will follow.

## 2020-01-27 NOTE — PDOC PROGRESS REPORT
Subjective


Progress Note for:: 01/27/20


Subjective:: 





Patient denies any palpitations but states that she was woken up this morning 

because her heart rate was so low dropping into the low 40s and high 30s and was

asked to get up and move around to increase her heart rate.  Currently denies 

any chest pain, shortness of breath or palpitations.


Reason For Visit: 


ON AMIODARONE INFUSION








Physical Exam


Vital Signs: 


                                        











Temp Pulse Resp BP Pulse Ox


 


 98.0 F   42 L  16   134/69 H  99 


 


 01/27/20 07:39  01/27/20 07:39  01/27/20 07:39  01/27/20 07:39  01/27/20 07:39








                                 Intake & Output











 01/26/20 01/27/20 01/28/20





 06:59 06:59 06:59


 


Intake Total 739 1280 


 


Output Total 735 1090 


 


Balance 4 190 


 


Weight 105.5 kg 107.5 kg 











General appearance: PRESENT: no acute distress, cooperative


Neck exam: ABSENT: JVD


Respiratory exam: PRESENT: symmetrical, unlabored.  ABSENT: tachypnea, wheezes


Cardiovascular exam: PRESENT: RRR, +S1, +S2.  ABSENT: tachycardia


GI/Abdominal exam: PRESENT: normal bowel sounds, soft.  ABSENT: rebound, rigid, 

tenderness


Neurological exam: PRESENT: alert, awake, oriented to person, oriented to place,

oriented to time





Results


Laboratory Results: 


                                        





                                 01/27/20 05:49 





                                 01/27/20 05:49 





                                        











  01/27/20 01/27/20





  05:49 05:49


 


WBC   4.2


 


RBC   2.71 L


 


Hgb   8.8 L


 


Hct   25.6 L


 


MCV   94


 


MCH   32.3


 


MCHC   34.3


 


RDW   15.3 H


 


Plt Count   198


 


Sodium  139.5 


 


Potassium  3.2 L 


 


Chloride  98 


 


Carbon Dioxide  41 H* 


 


Anion Gap  1 L 


 


BUN  22 H 


 


Creatinine  0.80 


 


Est GFR (African Amer)  > 60 


 


Glucose  63 L 


 


Calcium  7.7 L 


 


Magnesium  1.8 








                                        











  01/19/20 01/20/20 01/20/20





  16:25 02:40 08:40


 


Troponin I  < 0.012  < 0.012  < 0.012














  01/20/20





  15:36


 


Troponin I  < 0.012











Impressions: 


                                        





Head CT  01/19/20 21:36


IMPRESSION: 


 


No acute intracranial abnormalities.


 


 


 


 








Chest X-Ray  01/20/20 06:00


IMPRESSION:  Unchanged radiographic appearance of the chest.


 














Assessment and Plan





- Diagnosis


(1) Atrial fibrillation with rapid ventricular response


Is this a current diagnosis for this admission?: Yes   


Plan: 


Started on sotalol 40 mg every 12 yesterday.  Patient's arrhythmia control has 

been limited by the fact that when patient's atrial fibrillation breaks, patient

goes into sinus bradycardia often.


EKG showing normal QTC with sinus rhythm and underlying RBBB/left anterior 

fascicular block but now bradycardic into the 40s.


Continue to monitor on telemetry with serial EKGs.


i will discuss plan further with Dr. Ruiz who is following patient


Continue Xarelto








(2) Chronic obstructive pulmonary disease (COPD)


Qualifiers: 


   COPD type: unspecified COPD   Qualified Code(s): J44.9 - Chronic obstructive 

pulmonary disease, unspecified   


Is this a current diagnosis for this admission?: Yes   


Plan: 


Continue Trelegy inhaler.  Nebs PRN








(3) Morbid obesity with BMI of 40.0-44.9, adult


Is this a current diagnosis for this admission?: Yes   


Plan: 


Has received counseling regarding weight loss and diabetes control








(4) Acute on chronic diastolic congestive heart failure


Is this a current diagnosis for this admission?: Yes   


Plan: 


Continue Entresto and furosemide.  Rate control medications as above.








(5) Diabetes mellitus type 2 in obese


Is this a current diagnosis for this admission?: Yes   


Plan: 


Continue glimepiride, sliding scale and Accu-Cheks.








(6) Steroid long-term use


Is this a current diagnosis for this admission?: Yes   


Plan: 


Per documentation in ICU, there was concern for prolonged use of prednisone 

given COPD and patient was started on stress dose steroid when she was 

hypotensive giving risk of adrenal insufficiency.


I will continue to wean off hydrocortisone and decrease to 10 mg every 8 hours 

tomorrow








(7) Eructation


Is this a current diagnosis for this admission?: Yes   


Plan: 


Continue pepcid and simethicone








(8) CAROL (acute kidney injury)


Is this a current diagnosis for this admission?: Yes   


Plan: 


Associated with hypotension and hyperkalemia initially.  Currently resolved.








- Time


Time Spent with patient: 15-24 minutes

## 2020-01-28 LAB
ANION GAP SERPL CALC-SCNC: -3 MMOL/L (ref 5–19)
ARTERIAL BLOOD FIO2: (no result)
ARTERIAL BLOOD H2CO3: 2.1 MMOL/L (ref 1.05–1.35)
ARTERIAL BLOOD HCO3: 41.7 MMOL/L (ref 20–24)
ARTERIAL BLOOD PCO2: 69.9 MMHG (ref 35–45)
ARTERIAL BLOOD PH: 7.39 (ref 7.35–7.45)
ARTERIAL BLOOD PO2: 102.6 MMHG (ref 80–100)
ARTERIAL BLOOD TOTAL CO2: 43.8 MMOL/L (ref 21–25)
BASE EXCESS BLDA CALC-SCNC: 14.3 MMOL/L
BUN SERPL-MCNC: 24 MG/DL (ref 7–20)
CALCIUM: 8 MG/DL (ref 8.4–10.2)
CHLORIDE SERPL-SCNC: 97 MMOL/L (ref 98–107)
CO2 SERPL-SCNC: 45 MMOL/L (ref 22–30)
GLUCOSE SERPL-MCNC: 110 MG/DL (ref 75–110)
POTASSIUM SERPL-SCNC: 3.7 MMOL/L (ref 3.6–5)
SAO2 % BLDA: 97.4 % (ref 94–98)

## 2020-01-28 PROCEDURE — 5A09357 ASSISTANCE WITH RESPIRATORY VENTILATION, LESS THAN 24 CONSECUTIVE HOURS, CONTINUOUS POSITIVE AIRWAY PRESSURE: ICD-10-PCS | Performed by: SPECIALIST

## 2020-01-28 RX ADMIN — VALSARTAN SCH MG: 80 TABLET ORAL at 21:50

## 2020-01-28 RX ADMIN — Medication SCH ML: at 13:56

## 2020-01-28 RX ADMIN — DOCUSATE SODIUM SCH MG: 100 CAPSULE, LIQUID FILLED ORAL at 17:49

## 2020-01-28 RX ADMIN — FAMOTIDINE SCH MG: 20 TABLET, FILM COATED ORAL at 21:50

## 2020-01-28 RX ADMIN — PREGABALIN SCH MG: 100 CAPSULE ORAL at 17:49

## 2020-01-28 RX ADMIN — SACUBITRIL AND VALSARTAN SCH TAB: 49; 51 TABLET, FILM COATED ORAL at 10:26

## 2020-01-28 RX ADMIN — Medication SCH ML: at 05:29

## 2020-01-28 RX ADMIN — INSULIN HUMAN SCH: 100 INJECTION, SOLUTION PARENTERAL at 08:32

## 2020-01-28 RX ADMIN — HYDROCORTISONE SCH MG: 10 TABLET ORAL at 13:56

## 2020-01-28 RX ADMIN — PREGABALIN SCH MG: 100 CAPSULE ORAL at 10:19

## 2020-01-28 RX ADMIN — FUROSEMIDE SCH: 20 TABLET ORAL at 09:44

## 2020-01-28 RX ADMIN — AMLODIPINE BESYLATE SCH MG: 5 TABLET ORAL at 21:50

## 2020-01-28 RX ADMIN — GLIMEPIRIDE SCH MG: 1 TABLET ORAL at 10:26

## 2020-01-28 RX ADMIN — LEVOTHYROXINE SODIUM SCH MG: 25 TABLET ORAL at 05:29

## 2020-01-28 RX ADMIN — FLUTICASONE FUROATE, UMECLIDINIUM BROMIDE AND VILANTEROL TRIFENATATE SCH INH: 100; 62.5; 25 POWDER RESPIRATORY (INHALATION) at 12:00

## 2020-01-28 RX ADMIN — INSULIN HUMAN SCH: 100 INJECTION, SOLUTION PARENTERAL at 13:10

## 2020-01-28 RX ADMIN — RIVAROXABAN SCH MG: 10 TABLET, FILM COATED ORAL at 10:19

## 2020-01-28 RX ADMIN — HYDROCORTISONE SCH MG: 10 TABLET ORAL at 21:50

## 2020-01-28 RX ADMIN — HYDROCODONE BITARTRATE AND ACETAMINOPHEN PRN TAB: 5; 325 TABLET ORAL at 19:47

## 2020-01-28 RX ADMIN — DOCUSATE SODIUM SCH MG: 100 CAPSULE, LIQUID FILLED ORAL at 10:19

## 2020-01-28 RX ADMIN — PREGABALIN SCH MG: 100 CAPSULE ORAL at 13:56

## 2020-01-28 RX ADMIN — INSULIN HUMAN SCH: 100 INJECTION, SOLUTION PARENTERAL at 17:46

## 2020-01-28 RX ADMIN — HYDROCORTISONE SCH MG: 10 TABLET ORAL at 05:29

## 2020-01-28 RX ADMIN — Medication SCH ML: at 21:51

## 2020-01-28 RX ADMIN — INSULIN HUMAN SCH UNIT: 100 INJECTION, SOLUTION PARENTERAL at 21:51

## 2020-01-28 RX ADMIN — FAMOTIDINE SCH MG: 20 TABLET, FILM COATED ORAL at 10:19

## 2020-01-28 NOTE — PROGRESS NOTE
Provider Note


Provider Note: 





CARDIOLOGY PROGRESS NOTE by Dr. Nuvia Abdul on 1/28/2020.





SUBJECTIVE: The patient denies any chest pain or discomfort.  There is no PND 

orthopnea.  There is no recurrence of atrial fibrillation.  The patient is 

bradycardic.  In fact we were unable to give the patient sotalol due to 

bradycardia.  Hence will discontinue this.  We will continue the patient's 

Entresto for the patient's pulmonary hypertension.  Will add amlodipine.  Mother

was obtained for LV ejection correlation with the latest echo findings of EF of 

60%.  The mother showed LV ejection fraction of 58%.  Hence the patient is 

recovered from a cardiomyopathy which is most likely rate related.





PHYSICAL EXAMINATION: The patient is morbidly obese.  In no acute distress.


                                                                Selected Entries











  01/28/20





  07:32


 


Temperature 97.3 F


 


Temperature Oral





Source 


 


Pulse Rate 49 L


 


Respiratory 16





Rate 


 


Blood Pressure 173/68 H


 


Blood Pressure 103





Mean 


 


BP Location Right Arm


 


BP Position Supine


 


O2 Sat by Pulse 98





Oximetry 


 


Oxygen Flow 2.00





Rate 


 


Oxygen Delivery Nasal Cannula





Method 





HEAD: Is atraumatic.  Normocephalic.  EYES: Pupils are equal round regular 

reactive to light and accommodation.  Extraocular movements are normal there is 

no conjunctival pallor.  There is no scleral icterus.  EARS: Tympanic membranes 

are intact.  External auditory canals are clear.  NOSE: There is no deviated 

nasal septum.  There is no inflammation of the nasal mucous membrane.  MOUTH: 

There is no ulcers in the mouth.  Mucous membranes of the mouth are moist.  

THROAT: There is no redness of the oropharynx.  There is no exudates.  SKIN: 

There is no skin rashes.  There is no petechia or ecchymosis.  NECK: Is supple. 

There is mild JVD present.  Carotids are equal there is no bruit.  There is no 

lymphadenopathy.  There is no goiter.  There is no accessory muscle respiration 

use.  Trachea is central.  LUNGS: Shows diminished air entry prolonged 

expiration.  T.  There a few "Velcro" [E] rales of pulmonary fibrosis in the 

right base.. There are no rales of CHF..  HEART: S1-S2 is heard.  S1 now is of 

normal intensity.  There is no S3 gallop.  There is no S4 gallop.  There is 

systolic murmur of tricuspid regurgitation and mild mitral regurgitation murmur 

present.  There is no S3 gallop.  There is no S4 gallop.  There is no rub.  

ABDOMEN: Is obese.  Nontender.  There is no paraspinal megaly.  Bowel sounds are

 well heard.  EXTREMITIES: Femorals are deep.  Femorals are diminished there is 

no femoral bruits.  Leg pulses are diminished.  There is 1+ bilateral edema.  

There is no DVT or cellulitis.  There is no cyanosis or clubbing.  There is no 

calf tenderness.  CNS: The patient is conscious awake alert oriented x3 with no 

focal deficit.  PSYCHIATRIC:.  The patient judgment insight are intact her 

affect is normal.


MUGJA: LV ejection fraction is 58%.


Patient's twelve-lead EKG shows sinus bradycardia.  Right bundle branch block 

pattern and left intravascular block pattern.  Probable LVH.


                              Labs- All tests 24 hr











  01/27/20 01/27/20 01/28/20





  16:15 21:11 05:28


 


Carbonic Acid   


 


HCO3/H2CO3 Ratio   


 


ABG pH   


 


ABG pCO2   


 


ABG pO2   


 


ABG HCO3   


 


ABG Total CO2   


 


ABG O2 Saturation   


 


ABG Base Excess   


 


FiO2   


 


Sodium    139.3


 


Potassium    3.7


 


Chloride    97 L


 


Carbon Dioxide    45 H*


 


Anion Gap    -3 L


 


BUN    24 H


 


Creatinine    0.72


 


Est GFR ( Amer)    > 60


 


Est GFR (MDRD) Non-Af    > 60


 


Glucose    110


 


POC Glucose  272 H  226 H 


 


Calcium    8.0 L














  01/28/20 01/28/20 01/28/20





  07:36 09:10 11:47


 


Carbonic Acid   2.10 H 


 


HCO3/H2CO3 Ratio   19:1 


 


ABG pH   7.39 


 


ABG pCO2   69.9 H* 


 


ABG pO2   102.6 H 


 


ABG HCO3   41.7 H 


 


ABG Total CO2   43.8 H 


 


ABG O2 Saturation   97.4 


 


ABG Base Excess   14.3 


 


FiO2   30% 


 


Sodium   


 


Potassium   


 


Chloride   


 


Carbon Dioxide   


 


Anion Gap   


 


BUN   


 


Creatinine   


 


Est GFR ( Amer)   


 


Est GFR (MDRD) Non-Af   


 


Glucose   


 


POC Glucose  129 H   154 H


 


Calcium   








                                        





Chest X-Ray  01/19/20 16:04


IMPRESSION:  Cardiomegaly and mild central vascular congestion.  Elevation of 

the right hemidiaphragm.


 








Head CT  01/19/20 21:36


IMPRESSION: 


 


No acute intracranial abnormalities.


 


 


 


 








Chest X-Ray  01/20/20 01:40


IMPRESSION:


 


Satisfactory placement of central venous catheter


 


 


copyright 2011 [employer]- All Rights Reserved


 








Chest X-Ray  01/20/20 06:00


IMPRESSION:  Unchanged radiographic appearance of the chest.


 





IMPRESSION/RECOMMENDATION:





 1.  Atrial flutter.  Paroxysmal s/p cardioversion to sinus bradycardia.  The 

patient remained bradycardic for some time and then went into atrial 

fibrillation with ventricular response in the 110s to 120s.  Post 2.5 mg of IV 

verapamil and 40 mg of sotalol the patient converted to sinus bradycardia.  We 

will continue patient's on sotalol and will get serial EKGs and watch the 

patient for proarrhythmia and also watch the QTc interval.  Note the patient's 

MUGA shows LV ejection fraction of 58%.


2.  Acute on chronic hypoxic respiratory failure: Seems to be improved.  

Continue current treatment.


3.  ACute on chronic kidney disease: GFR is improving.  Avoid nephrotoxic drugs.


4.  History of asthma/COPD: Continue current treatment.


5.  History of pulmonary fibrosis.  We will recheck the patient's chest x-ray 

today.


6.  Hypertension: Blood pressure now is much improved.  The patient was 

hypotensive on admission on pressors.  This has been weaned off.  Patient blood 

pressure stable without any pressors.


7.  Diabetes mellitus: Continue antidiabetic medication and Accu-Cheks as per 

protocol.


8.  Hypothyroidism: Thyroid level seems to be optimal.


9.  Cardiomyopathy with moderately reduced LV ejection fraction.  Continue 

esmolol later transition to Toprol-XL.  Continue Entresto.  Note with the 

patient being in sinus rhythm patient repeat echocardiogram shows that the 

cardiomyopathy is resolved.  LV ejection fraction is 60% although the echo is 

not a very good echo study.  Note this is resolved most likely secondary to rate

 related cardiomyopathy.  MUGA shows LV ejection fraction of 58%.


10.  Moderate to severe tricuspid regurgitation with moderate pulmonary 

hypertension


11.  Successful elective DC synchronized cardioversion of the patient, atrial 

flutter to sinus rhythm.


12.  Moderate pulmonary hypertension: The patient's LV function is normal, and 

the patient is hypertensive.  Hence we will stop the patient's Entresto and 

switch to valsartan 80 mg p.o. every 12 hours and also add amlodipine 5 mg p.o. 

every 12 hours.


 





Medications reviewed.  Medical regimen and management plan discussed with the 

hospitalist attending provider.  Medical decision making is of high complexity. 

 I have discussed with the patient and the patient's daughter the findings of 

the patient's MU GA.  I have also spoken to her that in view of the bradycardia 

we are unable to put her on beta-blockers or AV ele blocking agents.  If the 

patient's has recurrence of atrial fibrillation then she will most likely need a

 permanent pacemaker to protect her from drug-induced bradycardia..  Will check 

a chest x-ray in the morning.  Cardiology status seems to be stable.  Will 

follow.  40 minutes spent on this patient more than 50% time spent direct 

patient care.

## 2020-01-28 NOTE — RADIOLOGY REPORT (SQ)
EXAM DESCRIPTION:  CHEST SINGLE VIEW



COMPLETED DATE/TIME:  1/28/2020 4:49 pm



REASON FOR STUDY:  Pulmonary Fibrosis



COMPARISON:  AP chest 12/30/2019, 1/19/2020, 1/20/2020



EXAM PARAMETERS:  NUMBER OF VIEWS: One view.

TECHNIQUE: Single frontal radiographic view of the chest acquired.

RADIATION DOSE: NA

LIMITATIONS: None.



FINDINGS:  LUNGS AND PLEURA: Elevated right hemidiaphragm.

Right apical pleuroparenchymal scarring.

No acute infiltrates.  No pleural effusion or pneumothorax.

MEDIASTINUM AND HILAR STRUCTURES: No masses.  Contour normal.

HEART AND VASCULAR STRUCTURES: Stable cardiomegaly

BONES: No acute findings.

HARDWARE: He was left-sided central line tip superior vena cava.  Surgical clips right axilla post ma
stectomy.

OTHER: No other significant finding.



IMPRESSION:  No acute findings



TECHNICAL DOCUMENTATION:  JOB ID:  [id number]

 2011 [employer]- All Rights Reserved



Reading location - IP/workstation name: [email or web address]

## 2020-01-28 NOTE — EKG REPORT
SEVERITY:- ABNORMAL ECG -

SINUS BRADYCARDIA

RBBB AND LAFB

PROBABLE LVH WITH SECONDARY REPOL ABNRM

:

Confirmed by: Michael Feldman 28-Jan-2020 09:47:02

## 2020-01-28 NOTE — PDOC PROGRESS REPORT
Subjective


Progress Note for:: 01/28/20


Subjective:: 





Patient denies any palpitations or shortness of breath today.  Swelling her 

lower extremities improving.  Patient concerned about her heart rate 

continuously being low.  Had elaborate discussion with patient and patient's 

daughter regarding the plan for atrial fibrillation and bradycardia.


Reason For Visit: 


ON AMIODARONE INFUSION








Physical Exam


Vital Signs: 


                                        











Temp Pulse Resp BP Pulse Ox


 


 97.3 F   75   18   173/68 H  96 


 


 01/28/20 07:32  01/28/20 12:20  01/28/20 12:20  01/28/20 07:32  01/28/20 12:20








                                 Intake & Output











 01/27/20 01/28/20 01/29/20





 06:59 06:59 06:59


 


Intake Total 1280 1330 


 


Output Total 1090 1900 


 


Balance 190 -570 


 


Weight 107.5 kg 107.9 kg 











General appearance: PRESENT: no acute distress, cooperative


Neck exam: ABSENT: JVD


Respiratory exam: PRESENT: clear to auscultation renay, symmetrical, unlabored.  

ABSENT: tachypnea, wheezes


Cardiovascular exam: PRESENT: bradycardia, RRR, +S1, +S2


GI/Abdominal exam: PRESENT: normal bowel sounds, soft.  ABSENT: rebound, rigid, 

tenderness


Neurological exam: PRESENT: alert, awake, oriented to person, oriented to place,

oriented to time





Results


Laboratory Results: 


                                        





                                 01/27/20 05:49 





                                 01/28/20 05:28 





                                        











  01/28/20 01/28/20





  05:28 09:10


 


Carbonic Acid   2.10 H


 


HCO3/H2CO3 Ratio   19:1


 


ABG pH   7.39


 


ABG pCO2   69.9 H*


 


ABG pO2   102.6 H


 


ABG HCO3   41.7 H


 


ABG O2 Saturation   97.4


 


ABG Base Excess   14.3


 


FiO2   30%


 


Sodium  139.3 


 


Potassium  3.7 


 


Chloride  97 L 


 


Carbon Dioxide  45 H* 


 


Anion Gap  -3 L 


 


BUN  24 H 


 


Creatinine  0.72 


 


Est GFR (African Amer)  > 60 


 


Glucose  110 


 


Calcium  8.0 L 








                                        











  01/19/20 01/20/20 01/20/20





  16:25 02:40 08:40


 


Troponin I  < 0.012  < 0.012  < 0.012














  01/20/20





  15:36


 


Troponin I  < 0.012











Impressions: 


                                        





Head CT  01/19/20 21:36


IMPRESSION: 


 


No acute intracranial abnormalities.


 


 


 


 








Chest X-Ray  01/20/20 06:00


IMPRESSION:  Unchanged radiographic appearance of the chest.


 














Assessment and Plan





- Diagnosis


(1) Atrial fibrillation with rapid ventricular response


Is this a current diagnosis for this admission?: Yes   


Plan: 


Patient diagnosed with paroxysmal atrial fibrillation 


EKG showing normal QTC with sinus rhythm and underlying RBBB/left anterior 

fascicular block but now bradycardic into the 40s.


Unfortunately, control of patient's A. fib has been limited by persistence of 

bradycardia while on rate/rhythm control medications even despite skipping 

sotalol dose last night and this morning.  


As a result, cardiology recommending discontinuation of sotalol and to leave 

patient without any rate controlling meds for now which I agree with.  Upon 

discharge, patient to follow-up for heart monitor to see if patient's paroxysmal

A. fib recurs often and to see if we control the medication would be required in

the long run.


Continue to monitor on telemetry 


Continue Xarelto


Discussed plan with patient and her daughter








(2) Chronic obstructive pulmonary disease (COPD)


Qualifiers: 


   COPD type: unspecified COPD   Qualified Code(s): J44.9 - Chronic obstructive 

pulmonary disease, unspecified   


Is this a current diagnosis for this admission?: Yes   





(3) Chronic respiratory failure with hypoxia and hypercapnia


Is this a current diagnosis for this admission?: Yes   


Plan: 


Secondary to COPD.  Patient is not in an acute exacerbation.  Patient is O2 

dependent at home and wears oxygen throughout the day.


Patient is also a chronic CO2 retainer with PCO2 as high as 89 mmHg on admission

and currently 69.9 mmHg on blood gas this morning despite not being in acute 

exacerbation.


This qualifies patient for nocturnal BiPAP which have discussed with patient and

patient is willing to use.


We will try her on nasal mask BiPAP tonight as patient often gets claustrophobic

when with a full facemask.








(4) Morbid obesity with BMI of 40.0-44.9, adult


Is this a current diagnosis for this admission?: Yes   


Plan: 


Has received counseling regarding weight loss and diabetes control








(5) Acute on chronic diastolic congestive heart failure


Is this a current diagnosis for this admission?: Yes   


Plan: 


Continue Entresto and furosemide.  Dr. Ruiz planning for MUGA scan tomorrow.








(6) Diabetes mellitus type 2 in obese


Is this a current diagnosis for this admission?: Yes   


Plan: 


Continue glimepiride, sliding scale and Accu-Cheks.








(7) Steroid long-term use


Is this a current diagnosis for this admission?: Yes   


Plan: 


Per documentation in ICU, there was concern for prolonged use of prednisone 

given COPD and patient was started on stress dose steroid when she was 

hypotensive given risk of adrenal insufficiency.


I will continue to wean off hydrocortisone and decrease to 10 mg every 8 hours 

today








(8) Eructation


Is this a current diagnosis for this admission?: Yes   


Plan: 


Continue pepcid and simethicone








(9) CAROL (acute kidney injury)


Is this a current diagnosis for this admission?: Yes   


Plan: 


Associated with hypotension and hyperkalemia initially.  Currently resolved.








- Time


Time Spent with patient: 15-24 minutes

## 2020-01-29 VITALS — DIASTOLIC BLOOD PRESSURE: 79 MMHG | SYSTOLIC BLOOD PRESSURE: 135 MMHG

## 2020-01-29 LAB
ANION GAP SERPL CALC-SCNC: -1 MMOL/L (ref 5–19)
ARTERIAL BLOOD FIO2: (no result)
ARTERIAL BLOOD H2CO3: 1.96 MMOL/L (ref 1.05–1.35)
ARTERIAL BLOOD HCO3: 42.9 MMOL/L (ref 20–24)
ARTERIAL BLOOD PCO2: 65 MMHG (ref 35–45)
ARTERIAL BLOOD PH: 7.44 (ref 7.35–7.45)
ARTERIAL BLOOD PO2: 85.9 MMHG (ref 80–100)
ARTERIAL BLOOD TOTAL CO2: 44.9 MMOL/L (ref 21–25)
BASE EXCESS BLDA CALC-SCNC: 16.1 MMOL/L
BUN SERPL-MCNC: 20 MG/DL (ref 7–20)
CALCIUM: 7.8 MG/DL (ref 8.4–10.2)
CHLORIDE SERPL-SCNC: 95 MMOL/L (ref 98–107)
CO2 SERPL-SCNC: 45 MMOL/L (ref 22–30)
GLUCOSE SERPL-MCNC: 56 MG/DL (ref 75–110)
POTASSIUM SERPL-SCNC: 3.1 MMOL/L (ref 3.6–5)
SAO2 % BLDA: 96.4 % (ref 94–98)

## 2020-01-29 RX ADMIN — INSULIN HUMAN SCH: 100 INJECTION, SOLUTION PARENTERAL at 08:13

## 2020-01-29 RX ADMIN — Medication SCH ML: at 05:43

## 2020-01-29 RX ADMIN — VALSARTAN SCH MG: 80 TABLET ORAL at 10:07

## 2020-01-29 RX ADMIN — HYDROCODONE BITARTRATE AND ACETAMINOPHEN PRN TAB: 5; 325 TABLET ORAL at 03:35

## 2020-01-29 RX ADMIN — FAMOTIDINE SCH MG: 20 TABLET, FILM COATED ORAL at 10:06

## 2020-01-29 RX ADMIN — HYDROCORTISONE SCH MG: 10 TABLET ORAL at 13:34

## 2020-01-29 RX ADMIN — GLIMEPIRIDE SCH MG: 1 TABLET ORAL at 10:12

## 2020-01-29 RX ADMIN — PREGABALIN SCH MG: 100 CAPSULE ORAL at 13:34

## 2020-01-29 RX ADMIN — AMLODIPINE BESYLATE SCH MG: 5 TABLET ORAL at 10:09

## 2020-01-29 RX ADMIN — Medication SCH ML: at 13:34

## 2020-01-29 RX ADMIN — LEVOTHYROXINE SODIUM SCH MG: 25 TABLET ORAL at 05:43

## 2020-01-29 RX ADMIN — POTASSIUM CHLORIDE SCH MEQ: 750 TABLET, FILM COATED, EXTENDED RELEASE ORAL at 13:34

## 2020-01-29 RX ADMIN — PREGABALIN SCH MG: 100 CAPSULE ORAL at 10:07

## 2020-01-29 RX ADMIN — POTASSIUM CHLORIDE SCH MEQ: 750 TABLET, FILM COATED, EXTENDED RELEASE ORAL at 10:06

## 2020-01-29 RX ADMIN — FLUTICASONE FUROATE, UMECLIDINIUM BROMIDE AND VILANTEROL TRIFENATATE SCH INH: 100; 62.5; 25 POWDER RESPIRATORY (INHALATION) at 10:13

## 2020-01-29 RX ADMIN — DOCUSATE SODIUM SCH MG: 100 CAPSULE, LIQUID FILLED ORAL at 10:07

## 2020-01-29 RX ADMIN — HYDROCORTISONE SCH MG: 10 TABLET ORAL at 05:43

## 2020-01-29 RX ADMIN — FUROSEMIDE SCH MG: 20 TABLET ORAL at 10:05

## 2020-01-29 RX ADMIN — INSULIN HUMAN SCH: 100 INJECTION, SOLUTION PARENTERAL at 12:04

## 2020-01-29 RX ADMIN — RIVAROXABAN SCH MG: 10 TABLET, FILM COATED ORAL at 10:06

## 2020-01-29 NOTE — RADIOLOGY REPORT (SQ)
INDICATION:  Congestive heart failure.



COMPARISON:  None.



CORRELATION:  None.



TECHNIQUE:   Multiple gated images were obtained as per standard

protocol after intravenous administration of 24.5 millicuries of

technetium 99m as pertechnetate for red blood cell labeling. 

Both visual and computer assisted digital analysis was performed.

Raw data and processed imaging has been reviewed



FINDINGS:  A good tag is seen.  Ejection fraction is low normal.



Digitally, it is measuring approximately 58 %.  No focal wall

motion abnormality.  



End diastolic volume 140 mL

End systolic volume 48 mL



IMPRESSION:  Low normal ejection fraction digitally measuring 58

%. Dilated left ventricle

## 2020-01-29 NOTE — PROGRESS NOTE
Provider Note


Provider Note: 





CARDIOLOGY PROGRESS NOTE by Dr. Nuvia Abdul on 1/29/2020.


SUBJECTIVE: The patient denies any chest pain or discomfort.  She still 

bradycardic.  With her blood pressure is high.  There is no recurrence of atrial

flutter.  There is no bleeding on Xarelto.  There is no TIA CVA symptoms.  Note 

with the patient's oxygen of her sats drop into the 80s.  With 2.5 L/min of 

nasal oxygen by cannula her sats come up to about 90%.





PHYSICAL EXAMINATION: The patient is morbidly obese in no acute distress.


                                                                Selected Entries











  01/29/20





  08:07


 


Temperature 98.4 F


 


Temperature Oral





Source 


 


Pulse Rate 63


 


Respiratory 20





Rate 


 


Blood Pressure 97





Mean 


 


BP Location Left Arm


 


O2 Sat by Pulse 97





Oximetry 


 


Oxygen Flow 2.50





Rate 


 


Oxygen Delivery Nasal Cannula





Method 








HEAD: Is atraumatic.  Normocephalic.  EYES: Pupils are equal round regular 

reactive to light and accommodation.  Extraocular movements are normal there is 

no conjunctival pallor.  There is no scleral icterus.  EARS: Tympanic membranes 

are intact.  External auditory canals are clear.  NOSE: There is no deviated 

nasal septum.  There is no inflammation of the nasal mucous membrane.  MOUTH: 

There is no ulcers in the mouth.  Mucous membranes of the mouth are moist.  

THROAT: There is no redness of the oropharynx.  There is no exudates.  SKIN: 

There is no skin rashes.  There is no petechia or ecchymosis.  NECK: Is supple. 

There is mild JVD present.  Carotids are equal there is no bruit.  There is no 

lymphadenopathy.  There is no goiter.  There is no accessory muscle respiration 

use.  Trachea is central.  LUNGS: Shows diminished air entry prolonged 

expiration.  T.  There a few "Velcro" [E] rales of pulmonary fibrosis in the 

right base.. There are no rales of CHF..  HEART: S1-S2 is heard.  S1 now is of 

normal intensity.  There is no S3 gallop.  There is no S4 gallop.  There is 

systolic murmur of tricuspid regurgitation and mild mitral regurgitation murmur 

present.  There is no S3 gallop.  There is no S4 gallop.  There is no rub.  

ABDOMEN: Is obese.  Nontender.  There is no paraspinal megaly.  Bowel sounds are

 well heard.  EXTREMITIES: Femorals are deep.  Femorals are diminished there is 

no femoral bruits.  Leg pulses are diminished.  There is 1+ bilateral edema.  

There is no DVT or cellulitis.  There is no cyanosis or clubbing.  There is no 

calf tenderness.  CNS: The patient is conscious awake alert oriented x3 with no 

focal deficit.  PSYCHIATRIC:.  The patient judgment insight are intact her 

affect is normal.





MUGJA: LV ejection fraction is 58%.


                              Labs- All tests 24 hr











  01/28/20 01/28/20 01/28/20





  11:47 16:14 21:14


 


Carbonic Acid   


 


HCO3/H2CO3 Ratio   


 


ABG pH   


 


ABG pCO2   


 


ABG pO2   


 


ABG HCO3   


 


ABG Total CO2   


 


ABG O2 Saturation   


 


ABG Base Excess   


 


FiO2   


 


Sodium   


 


Potassium   


 


Chloride   


 


Carbon Dioxide   


 


Anion Gap   


 


BUN   


 


Creatinine   


 


Est GFR ( Amer)   


 


Est GFR (MDRD) Non-Af   


 


Glucose   


 


POC Glucose  154 H  115 H  228 H


 


Calcium   














  01/29/20 01/29/20 01/29/20





  05:25 05:34 07:59


 


Carbonic Acid   1.96 H 


 


HCO3/H2CO3 Ratio   21:1 


 


ABG pH   7.44 


 


ABG pCO2   65.0 H 


 


ABG pO2   85.9 


 


ABG HCO3   42.9 H 


 


ABG Total CO2   44.9 H 


 


ABG O2 Saturation   96.4 


 


ABG Base Excess   16.1 


 


FiO2   30% 


 


Sodium  138.7  


 


Potassium  3.1 L  


 


Chloride  95 L  


 


Carbon Dioxide  45 H*  


 


Anion Gap  -1 L  


 


BUN  20  


 


Creatinine  0.64  


 


Est GFR ( Amer)  > 60  


 


Est GFR (MDRD) Non-Af  > 60  


 


Glucose  56 L  


 


POC Glucose    76


 


Calcium  7.8 L  








                                        





Chest X-Ray  01/19/20 16:04


IMPRESSION:  Cardiomegaly and mild central vascular congestion.  Elevation of 

the right hemidiaphragm.


 








Head CT  01/19/20 21:36


IMPRESSION: 


 


No acute intracranial abnormalities.


 


 


 


 








Chest X-Ray  01/20/20 01:40


IMPRESSION:


 


Satisfactory placement of central venous catheter


 


 


copyright 2011 [employer]- All Rights Reserved


 








Chest X-Ray  01/20/20 06:00


IMPRESSION:  Unchanged radiographic appearance of the chest.


 








Cardiac Imaging Nuclear Medicine  01/28/20 00:00


IMPRESSION:  Low normal ejection fraction digitally measuring 58


%. Dilated left ventricle


 








Chest X-Ray  01/28/20 00:00


IMPRESSION:  No acute findings





EKG:SINUS BRADYCARDIA


[RLAFB] . RBBB AND LAFB


[LVHPRE] . PROBABLE LVH WITH SECONDARY REPOL ABNRM


 


IMPRESSION/RECOMMENDATION:


 1.  Atrial flutter.  Paroxysmal s/p cardioversion to sinus bradycardia.  The 

patient remained bradycardic, and hence unable to start the patient on a sotalol

 or a SA or AV node blocking agent.  The patient being transferred to [facility].  Will have the company send a 30-day event monitor.  If the 

patient should have recurrence of atrial flutter or fibrillation with rapid 

ventricular response the patient will need permanent pacemaker to protect her 

from cardiac cardia due to the medications placed to control the heart rate.  

This is been discussed with the patient and patient's daughter.  MUGA shows LV 

ejection fraction of 58%.


2.  Acute on chronic hypoxic respiratory failure: The acute phase is resolved.  

Patient back to baseline.  Continue current treatment.


3.  ACute on chronic kidney disease: GFR is now normal and greater than 60.


4.  History of asthma/COPD: Continue current treatment.


5.  History of pulmonary fibrosis.  We will recheck the patient's chest x-ray 

today.


6.  Hypertension: Blood pressure now is much improved.  The patient was 

hypotensive on admission on pressors.  This has been weaned off.  Patient blood 

pressure stable without any pressors.


7.  Diabetes mellitus: Continue antidiabetic medication and Accu-Cheks as per 

protocol.


8.  Hypothyroidism: Thyroid level seems to be optimal.


9.  Cardiomyopathy with moderately reduced LV ejection fraction.  This is 

resolved.  MUGA shows LV ejection fraction of 58%.


10.  Moderate to severe tricuspid regurgitation with moderate pulmonary 

hypertension


11.  Successful elective DC synchronized cardioversion of the patient, atrial 

flutter to sinus rhythm.


12.  Moderate pulmonary hypertension: We will stop the patient's Entresto.  I 

have placed the patient on valsartan 80 mg p.o. twice daily and also amlodipine 

5 mg p.o. twice daily.





Medications reviewed.  Medications had been adjusted.  Medical regimen and 

management plan discussed with attending physician on the case.  Medical 

decision making hours of moderate complexity.  40 minutes spent on patient with 

more than 50% time spent in direct patient care.  The patient being transferred 

to [facility].  Will get an event monitor sent to their place.  Will 

sign off.  Will follow the patient in the office.

## 2020-01-29 NOTE — PDOC TRANSFER SUMMARY
Impression





- Admit/DC Date/PCP


Admission Date/Primary Care Provider: 


  01/19/20 23:22





  LESLIE MABRY MD





Discharge Date: 01/29/20





- Discharge Diagnosis


(1) Atrial fibrillation with rapid ventricular response


Is this a current diagnosis for this admission?: Yes   





(2) Chronic obstructive pulmonary disease (COPD)


Is this a current diagnosis for this admission?: Yes   





(3) Chronic respiratory failure with hypoxia and hypercapnia


Is this a current diagnosis for this admission?: Yes   





(4) Morbid obesity with BMI of 40.0-44.9, adult


Is this a current diagnosis for this admission?: Yes   





(5) Acute on chronic diastolic congestive heart failure


Is this a current diagnosis for this admission?: Yes   





(6) Diabetes mellitus type 2 in obese


Is this a current diagnosis for this admission?: Yes   





(7) Steroid long-term use


Is this a current diagnosis for this admission?: Yes   





(8) Eructation


Is this a current diagnosis for this admission?: Yes   





(9) CAROL (acute kidney injury)


Is this a current diagnosis for this admission?: Yes   





- Assessment


Summary: 


Patient was initially admitted to the ICU given her hypotension accompanied by 

atrial fibrillation and rapid ventricular response.  Of note patient does have 

history of paroxysmal atrial fibrillation/aflutter.  Patient was cardioverted 

back into sinus rhythm but was now in sinus bradycardia upon cardioversion.  

Regarding patient's hypotension, patient received some fluids.  The hypotension 

was secondary to unstable atrial fibrillation and caused some CAROL with 

corresponding hyperkalemia which resolved after fluids.  Patient's thyroid 

levels were within normal limits.  Patient was started on SA and AV ele 

blockers but persistently made patient even more bradycardic.  Metoprolol and 

diltiazem was subsequently discontinued.  Patient was tried on sotalol which 

made patient even more bradycardic as well.  Since the initial cardioversion, 

patient has maintained in sinus rhythm.  Given patient's inability to tolerate 

SA or AV ele blockers, the decision was made by the cardiologist to keep pat

ient off any form of rate control for now.  Patient will follow up with a 

cardiologist to get a heart monitor and if patient should be noted to go back 

into rapid ventricular response, patient will have to get a permanent pacemaker 

placed to protect her from bradycardic episodes and then placed on an SA or AV 

ele blocker.  Patient is to follow-up with Dr. Ruiz in the clinic.





Patient also has acute on chronic congestive heart failure likely triggered by 

her tachyarrhythmia.  Echocardiogram and MUGA scan revealed normal ejection 

fraction suggesting diastolic heart failure of 58%.  Patient is to continue her 

Lasix with p.o. potassium supplementation and maintain adequate control of her 

blood pressure.  Cardiologist has discontinued Entresto and changed patient's 

regimen to valsartan and amlodipine.





Regarding patient's COPD, she is noted to have chronic respiratory failure with 

hypercapnia and hypoxia warranting starting patient on nocturnal BiPAP given 

elevated PCO2 of 69 mmHg on a.m. blood gas after sleeping with only nasal 

cannula.  Patient has been started on nocturnal BiPAP with FiO2 of 30%, 15/8 and

RR of 16 to be used when patient is sleeping.














- Additional Information


Resuscitation Status: Full Code


Referrals: 


LESLIE MABRY MD [Primary Care Provider] - Follow up as needed


YOVANA FIGUEROA MD [ACTIVE STAFF] - 


Home Medications: 








Fluticasone/Umeclidin/Vilanter [Trelegy 100-62.5-25 Mcg Ellipta 14 Dose/Dpi] 1 

puff IH DAILY 12/26/19 


Furosemide [Lasix 20 mg Tablet] 20 mg PO QAM 12/26/19 


Glimepiride 2 mg PO DAILY 12/26/19 


Hydrocodone/Acetaminophen [Norco 5-325 Tablet] 1 each PO Q6HP PRN 12/26/19 


Levothyroxine Sodium [Synthroid 0.025 mg Tablet] 25 mcg PO DAILY 12/26/19 


Potassium Chloride [Klor-Con M20] 20 meq PO DAILY 12/26/19 


Pregabalin [Lyrica 100 mg Capsule] 100 mg PO TID 12/26/19 


Rivaroxaban [Xarelto] 20 mg PO DAILY 12/26/19 


Amlodipine Besylate [Norvasc 5 mg Tablet] 5 mg PO Q12  tablet 01/29/20 


Docusate Sodium [Colace 100 mg Capsule] 100 mg PO BID  capsule 01/29/20 


Famotidine [Pepcid 20 mg Tablet] 20 mg PO Q12  tablet 01/29/20 


Valsartan [Diovan 80 mg Tablet] 80 mg PO Q12  tablet 01/29/20 











History of Present Illiness


History of Present Illness: 


SHAYAN BUSH is a 79 year old female with PMHx of COPD, CHF with most recent 

echo showing EF of 55-60% on 1/9/20, pulmonary fibrosis, COPD on home O2, HTN, 

hx of breast CA with bilateral mastecomy, DMII and arthritis. She was brought to

the ED tonight by her daughter after pt had complaints of increased SOB and 

reported "heartburn" symptoms after eating tomato soup. Pt was discharged from 

the hospital 15 days ago after being treated for weakness and SOB. Daughter 

stated that pt usually ambulates with a walker and suffered a fall down to 

bilateral knees about 5 days ago. She believes her knees gave out on her while 

walking; she did not hit her head and there was no LOC. From this fall pt was 

not able to ambulate and required family assistance to transfer from chair to 

bedside commode. During one of these transfers, she slid from the arms of family

and to the ground, again to her knees. Daughter states she has been noticeably 

more lethargic over the past 3-4 days but able to be aroused to eat small meals 

and take her meds that family administers for her. No complaints of increase 

cough. Daughter states she has not had changes to bowel or bladder. On 

assessment in the ED, pt is noted to be hypotension 70s and in Afib with a rate 

100-120 on the monitor; daughter states pt did not take her medications this AM.

She is resting in bed with NC on without acute distress. Difficult to arouse but

when asked to wake by daughter pt will mumble and did answer "no" when asked if 

she was hungry but did not open her eyes. Labs on arrival were significant for K

6.1 that was treat by ED - repeat POC while at bedside was 94. Request ED obtain

CT to rule out acute concerns for AMS/lethargy which was negative. 











Physical Exam


Vital Signs: 


                                        











Temp Pulse Resp BP Pulse Ox


 


 98.4 F   63   20   135/79 H  97 


 


 01/29/20 08:07  01/29/20 08:07  01/29/20 08:07  01/29/20 08:07  01/29/20 08:07








                                 Intake & Output











 01/28/20 01/29/20 01/30/20





 06:59 06:59 06:59


 


Intake Total 1330 1060 


 


Output Total 1900 350 


 


Balance -570 710 


 


Weight 107.9 kg 107 kg 











General appearance: PRESENT: no acute distress, cooperative


Neck exam: ABSENT: JVD


Respiratory exam: PRESENT: symmetrical, unlabored.  ABSENT: rhonchi, tachypnea, 

wheezes


Cardiovascular exam: PRESENT: bradycardia, RRR, +S1, +S2.  ABSENT: tachycardia


GI/Abdominal exam: PRESENT: soft.  ABSENT: rebound, rigid, tenderness


Extremities exam: PRESENT: pedal edema


Neurological exam: PRESENT: alert, awake, oriented to person, oriented to place,

oriented to time





Results


Laboratory Results: 


                                        











WBC  4.2 10^3/uL (4.0-10.5)   01/27/20  05:49    


 


RBC  2.71 10^6/uL (3.72-5.28)  L  01/27/20  05:49    


 


Hgb  8.8 g/dL (12.0-15.5)  L  01/27/20  05:49    


 


Hct  25.6 % (36.0-47.0)  L  01/27/20  05:49    


 


MCV  94 fl (80-97)   01/27/20  05:49    


 


MCH  32.3 pg (27.0-33.4)   01/27/20  05:49    


 


MCHC  34.3 g/dL (32.0-36.0)   01/27/20  05:49    


 


RDW  15.3 % (11.5-14.0)  H  01/27/20  05:49    


 


Plt Count  198 10^3/uL (150-450)   01/27/20  05:49    


 


Lymph % (Auto)  18.5 % (13-45)   01/20/20  06:45    


 


Mono % (Auto)  10.4 % (3-13)   01/20/20  06:45    


 


Eos % (Auto)  3.0 % (0-6)   01/20/20  06:45    


 


Baso % (Auto)  0.6 % (0-2)   01/20/20  06:45    


 


Absolute Neuts (auto)  3.9 10^3/uL (1.7-8.2)   01/20/20  06:45    


 


Absolute Lymphs (auto)  1.1 10^3/uL (0.5-4.7)   01/20/20  06:45    


 


Absolute Monos (auto)  0.6 10^3/uL (0.1-1.4)   01/20/20  06:45    


 


Absolute Eos (auto)  0.2 10^3/uL (0.0-0.6)   01/20/20  06:45    


 


Absolute Basos (auto)  0.0 10^3/uL (0.0-0.2)   01/20/20  06:45    


 


Seg Neutrophils %  67.5 % (42-78)   01/20/20  06:45    


 


PT  27.1 SEC (11.4-15.4)  H  01/20/20  02:40    


 


INR  2.46   01/20/20  02:40    


 


APTT  39.9 SEC (23.5-35.8)  H  01/20/20  02:40    


 


Carbonic Acid  1.96 mmol/L (1.05-1.35)  H  01/29/20  05:34    


 


HCO3/H2CO3 Ratio  21:1   01/29/20  05:34    


 


ABG pH  7.44  (7.35-7.45)   01/29/20  05:34    


 


ABG pCO2  65.0 mmHg (35-45)  H  01/29/20  05:34    


 


ABG pO2  85.9 mmHg ()   01/29/20  05:34    


 


ABG HCO3  42.9 mmol/L (20-24)  H  01/29/20  05:34    


 


ABG Total CO2  44.9 mmol/L (21-25)  H  01/29/20  05:34    


 


ABG O2 Saturation  96.4 % (94-98)   01/29/20  05:34    


 


ABG Base Excess  16.1 mmol/L  01/29/20  05:34    


 


VBG pH  7.27  (7.30-7.42)  L  01/19/20  16:25    


 


VBG pCO2  89.1 mmHg (35-63)  H*  01/19/20  16:25    


 


VBG HCO3  40.0 mmol/L (20-32)  H  01/19/20  16:25    


 


VBG Base Excess  9.2 mmol/L  01/19/20  16:25    


 


FiO2  30%   01/29/20  05:34    


 


Sodium  138.7 mmol/L (137-145)   01/29/20  05:25    


 


Potassium  3.1 mmol/L (3.6-5.0)  L  01/29/20  05:25    


 


Chloride  95 mmol/L ()  L  01/29/20  05:25    


 


Carbon Dioxide  45 mmol/L (22-30)  H*  01/29/20  05:25    


 


Anion Gap  -1  (5-19)  L  01/29/20  05:25    


 


BUN  20 mg/dL (7-20)   01/29/20  05:25    


 


Creatinine  0.64 mg/dL (0.52-1.25)   01/29/20  05:25    


 


Est GFR ( Amer)  > 60  (>60)   01/29/20  05:25    


 


Est GFR (MDRD) Non-Af  > 60  (>60)   01/29/20  05:25    


 


Glucose  56 mg/dL ()  L  01/29/20  05:25    


 


POC Glucose  157 mg/dL ()  H  01/29/20  11:44    


 


Lactic Acid  1.2 mmol/L (0.7-2.1)   01/19/20  18:23    


 


Calcium  7.8 mg/dL (8.4-10.2)  L  01/29/20  05:25    


 


Phosphorus  3.9 mg/dL (2.5-4.5)   01/22/20  05:11    


 


Magnesium  1.8 mg/dL (1.6-2.3)   01/27/20  05:49    


 


Total Bilirubin  0.2 mg/dL (0.2-1.3)   01/24/20  02:59    


 


Direct Bilirubin  0.0 mg/dL (0.0-0.4)   01/24/20  02:59    


 


Neonat Total Bilirubin  Not Reportable   01/24/20  02:59    


 


Neonat Direct Bilirubin  Not Reportable   01/24/20  02:59    


 


Neonat Indirect Bili  Not Reportable   01/24/20  02:59    


 


AST  13 U/L (14-36)  L  01/24/20  02:59    


 


ALT  17 U/L (<35)   01/24/20  02:59    


 


Alkaline Phosphatase  64 U/L ()   01/24/20  02:59    


 


Troponin I  < 0.012 ng/mL  01/20/20  15:36    


 


Total Protein  4.5 g/dL (6.3-8.2)  L  01/24/20  02:59    


 


Albumin  2.4 g/dL (3.5-5.0)  L  01/24/20  02:59    


 


TSH  0.21 uIU/mL (0.47-4.68)  L  01/21/20  20:29    


 


Free T4  0.78 ng/dL (0.78-2.19)   01/22/20  10:34    


 


Free T3 pg/mL  1.53 pg/mL (2.77-5.27)  L  01/22/20  10:34    


 


Random Cortisol  8.74 ug/dL (None Established)   01/20/20  08:40    


 


Urine Color  YELLOW   01/19/20  19:24    


 


Urine Appearance  CLEAR   01/19/20  19:24    


 


Urine pH  5.0  (5.0-9.0)   01/19/20  19:24    


 


Ur Specific Gravity  1.015   01/19/20  19:24    


 


Urine Protein  NEGATIVE mg/dL (NEGATIVE)   01/19/20  19:24    


 


Urine Glucose (UA)  50 mg/dL (NEGATIVE)  H  01/19/20  19:24    


 


Urine Ketones  NEGATIVE mg/dL (NEGATIVE)   01/19/20  19:24    


 


Urine Blood  NEGATIVE  (NEGATIVE)   01/19/20  19:24    


 


Urine Nitrite (Reflex)  NEGATIVE  (NEGATIVE)   01/19/20  19:24    


 


Urine Bilirubin  NEGATIVE  (NEGATIVE)   01/19/20  19:24    


 


Urine Urobilinogen  NEGATIVE mg/dL (<2.0)   01/19/20  19:24    


 


Leukocyte Esterase Rfl  TRACE  (NEGATIVE)  H  01/19/20  19:24    


 


Urine RBC (Auto)  3 /HPF  01/19/20  19:24    


 


U Hyaline Cast (Auto)  6 /LPF  01/19/20  19:24    


 


Urine WBC (Reflex)  5 /HPF  01/19/20  19:24    


 


Squamous Epi Cells Auto  6 /HPF  01/19/20  19:24    


 


Urine Mucus (Auto)  RARE /LPF  01/19/20  19:24    


 


Urine Ascorbic Acid  NEGATIVE  (NEGATIVE)   01/19/20  19:24    








                                        











  01/19/20 01/20/20 01/20/20





  16:25 02:40 08:40


 


Troponin I  < 0.012  < 0.012  < 0.012














  01/20/20





  15:36


 


Troponin I  < 0.012











Impressions: 


                                        





Chest X-Ray  01/19/20 16:04


IMPRESSION:  Cardiomegaly and mild central vascular congestion.  Elevation of 

the right hemidiaphragm.


 








Head CT  01/19/20 21:36


IMPRESSION: 


 


No acute intracranial abnormalities.


 


 


 


 








Chest X-Ray  01/20/20 01:40


IMPRESSION:


 


Satisfactory placement of central venous catheter


 


 


copyright 2011 [employer]- All Rights Reserved


 








Chest X-Ray  01/20/20 06:00


IMPRESSION:  Unchanged radiographic appearance of the chest.


 








Cardiac Imaging Nuclear Medicine  01/28/20 00:00


IMPRESSION:  Low normal ejection fraction digitally measuring 58


%. Dilated left ventricle


 








Chest X-Ray  01/28/20 00:00


IMPRESSION:  No acute findings


 














Plan


Plan of Treatment: 


SNF patient.


Time Spent: Greater than 30 Minutes





Stroke


Is this a Stroke Patient?: No





Acute Heart Failure





- **


Is this a Heart Failure Patient?: Yes


Documentation of LVEF assessment?: Yes


LVEF < 40%?: No- if no continue to question #3


3. Anticoagulant therapy for permanect/persistent/paraoxysmal Afib or Aflutter: 

Yes


Follow-up Appointment scheduled within 7 days?: Yes

## 2020-01-29 NOTE — EKG REPORT
SEVERITY:- ABNORMAL ECG -

SINUS BRADYCARDIA

RBBB AND LAFB

PROBABLE LVH WITH SECONDARY REPOL ABNRM

:

Confirmed by: Michael Feldman 29-Jan-2020 14:24:13

## 2020-07-20 ENCOUNTER — HOSPITAL ENCOUNTER (EMERGENCY)
Dept: HOSPITAL 62 - ER | Age: 80
LOS: 1 days | Discharge: HOME | End: 2020-07-21
Payer: MEDICARE

## 2020-07-20 DIAGNOSIS — I11.0: ICD-10-CM

## 2020-07-20 DIAGNOSIS — E11.9: ICD-10-CM

## 2020-07-20 DIAGNOSIS — Z88.8: ICD-10-CM

## 2020-07-20 DIAGNOSIS — I50.9: ICD-10-CM

## 2020-07-20 DIAGNOSIS — M79.641: ICD-10-CM

## 2020-07-20 DIAGNOSIS — Z87.891: ICD-10-CM

## 2020-07-20 DIAGNOSIS — J44.9: ICD-10-CM

## 2020-07-20 DIAGNOSIS — M79.644: ICD-10-CM

## 2020-07-20 DIAGNOSIS — S69.91XA: Primary | ICD-10-CM
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
## 2020-07-20 PROCEDURE — 73130 X-RAY EXAM OF HAND: CPT

## 2020-07-20 PROCEDURE — 99283 EMERGENCY DEPT VISIT LOW MDM: CPT

## 2020-07-21 VITALS — DIASTOLIC BLOOD PRESSURE: 73 MMHG | SYSTOLIC BLOOD PRESSURE: 125 MMHG

## 2020-07-21 NOTE — ER DOCUMENT REPORT
Entered by NAFISA BOYD SCRIBE  07/21/20 0259 





Acting as scribe for:CARA ARAMBULA DO





ED Hand/Wrist Injury





- General


Chief Complaint: Hand Pain


Stated Complaint: RIGHT HAND PAIN


Time Seen by Provider: 07/21/20 01:46


Primary Care Provider: 


LESLIE MABRY MD [Primary Care Provider] - Follow up as needed


CAROL KILPATRICK DO [ACTIVE STAFF] - Follow up in 3-5 days


Information source: Patient


Notes: 





This 80-year-old female patient presents to the emergency department today with 

complaints of right index finger pain.  Patient reports that she woke up from a 

nap and noticed that her head was resting awkwardly on her hands, and in 

particular that finger.  Patient reports that the finger feels like it is "out 

of place".





TRAVEL OUTSIDE OF THE [place] IN LAST 30 DAYS: No





- Related Data


Allergies/Adverse Reactions: 


                                        





albuterol Allergy (Verified 07/21/20 00:10)


   


aspirin Allergy (Verified 07/21/20 00:10)


   


diphenhydramine [From Benadryl] Allergy (Verified 07/21/20 00:10)


   











Past Medical History





- General


Information source: Patient





- Social History


Smoking Status: Former Smoker


Cigarette use (# per day): No


Frequency of alcohol use: None


Drug Abuse: None


Lives with: Family


Family History: Reviewed & Not Pertinent, CAD, DM, Hypertension, Malignancy.  

denies: Thyroid Disfunction


Patient has homicidal ideation: No





- Past Medical History


Cardiac Medical History: Reports: Hx Atrial Fibrillation, Hx Congestive Heart 

Failure, Hx Hypertension


Pulmonary Medical History: Reports: Hx Asthma, Hx COPD, Hx Pneumonia, Hx 

Respiratory Failure - Chronic respiratory failure with oxygen dependence


Endocrine Medical History: Reports: Hx Diabetes Mellitus Type 2


Malignancy Medical History: Reports: Hx Breast Cancer - Status post bilateral 

mastectomies


GI Medical History: Reports: Hx Hiatal Hernia


Musculoskeletal Medical History: Reports Hx Arthritis


Past Surgical History: Reports: Hx Abdominal Surgery - hernia repair, Hx 

Appendectomy, Hx Breast Surgery, Hx Cholecystectomy, Hx Hysterectomy, Hx 

Mastectomy - bilateral, Other - Bilateral cataract surgery, 5 colon surgeries 

for abnormal growths





Review of Systems





- Review of Systems


Constitutional: No symptoms reported


EENT: No symptoms reported


Cardiovascular: No symptoms reported


Respiratory: No symptoms reported


Gastrointestinal: No symptoms reported


Genitourinary: No symptoms reported


Female Genitourinary: No symptoms reported


Musculoskeletal: See HPI, Joint pain, Joint swelling


Skin: No symptoms reported


Hematologic/Lymphatic: No symptoms reported


Neurological/Psychological: No symptoms reported


-: Yes All other systems reviewed and negative





Physical Exam





- Vital signs


Vitals: 


                                        











Temp Pulse Resp BP Pulse Ox


 


 98.4 F   72   22 H  119/75   96 


 


 07/20/20 23:22  07/20/20 23:22  07/20/20 23:22  07/20/20 23:22  07/20/20 23:22











Interpretation: Normal





- General


General appearance: Appears well, Alert





- HEENT


Head: Normocephalic, Atraumatic


Eyes: Normal


Pupils: PERRL





- Respiratory


Respiratory status: No respiratory distress


Chest status: Nontender


Breath sounds: Normal


Chest palpation: Normal





- Cardiovascular


Rhythm: Regular


Heart sounds: Normal auscultation


Murmur: No





- Abdominal


Inspection: Normal


Distension: No distension


Bowel sounds: Normal


Tenderness: Nontender


Organomegaly: No organomegaly





- Back


Back: Normal, Nontender





- Extremities


General upper extremity: Tender, Normal color, Normal temperature


General lower extremity: Normal inspection, Nontender, Normal color, Normal ROM,

Normal temperature, Normal weight bearing.  No: Mercedes's sign


Shoulder: Normal


Arm: Normal


Elbow: Normal


Forearm: Normal


Wrist: Normal


Hand: Tender - Over right index MCP., Swelling - Over right index MCP.  No 

erythema.  No discharge.  No warmth.


Hip: Normal


Thigh: Normal


Knee: Normal


Ankle: Normal


Foot: Normal





- Neurological


Neuro grossly intact: Yes


Cognition: Normal


Orientation: AAOx4


[place] Coma Scale Eye Opening: Spontaneous


[place] Coma Scale Verbal: Oriented


[place] Coma Scale Motor: Obeys Commands


[place] Coma Scale Total: 15


Speech: Normal


Motor strength normal: LUE, RUE, LLE, RLE


Sensory: Normal





- Psychological


Associated symptoms: Normal affect, Normal mood





- Skin


Skin Temperature: Warm


Skin Moisture: Dry


Skin Color: Normal





Course





- Re-evaluation


Re-evalutation: 





07/21/20 


Patient comes in for pain of her right index finger over her MCP.  She does not 

remember any injury.  No evidence for gout or infection.  X-ray with no evidence

for fracture.  Clinically, joint appears possibly dislocated although patient 

does not remember injuring it.  It is quite possible that she sprained it in her

sleep.  Regardless, hand will be Ace wrapped and patient is to follow-up with 

orthopedics.  Again, no evidence for infection.  No other complaints or 

concerns.  Follow-up with orthopedics.  Return if any worsening concerns or 

symptoms.  Understands and agrees with plan.  Stable for discharge.








- Vital Signs


Vital signs: 


                                        











Temp Pulse Resp BP Pulse Ox


 


 98.7 F   75   20   125/73   96 


 


 07/21/20 03:11  07/21/20 03:11  07/21/20 03:11  07/21/20 03:11  07/21/20 03:11














- Diagnostic Test


Radiology reviewed: Image reviewed, Reports reviewed





Procedures





- Immobilization


  ** Right 2nd digit


Pre-Proc Neuro Vasc Exam: Normal


Immobilizer type: Ace wrap


Performed by: Provider


Post-Proc Neuro Vasc Exam: Normal


Alignment checked and good: Yes





Discharge





- Discharge


Clinical Impression: 


Hand injury


Qualifiers:


 Encounter type: initial encounter Laterality: right Qualified Code(s): S69.91XA

- Unspecified injury of right wrist, hand and finger(s), initial encounter





Condition: Stable


Disposition: HOME, SELF-CARE


Instructions:  Sprained Finger ([facility])


Referrals: 


LESLIE MABRY MD [Primary Care Provider] - Follow up as needed


CAROL KILPTARICK DO [ACTIVE STAFF] - Follow up in 3-5 days





I personally performed the services described in the documentation, reviewed and

edited the documentation which was dictated to the scribe in my presence, and it

accurately records my words and actions.
